# Patient Record
Sex: FEMALE | Race: BLACK OR AFRICAN AMERICAN | NOT HISPANIC OR LATINO | Employment: PART TIME | URBAN - METROPOLITAN AREA
[De-identification: names, ages, dates, MRNs, and addresses within clinical notes are randomized per-mention and may not be internally consistent; named-entity substitution may affect disease eponyms.]

---

## 2020-09-25 ENCOUNTER — LAB VISIT (OUTPATIENT)
Dept: LAB | Facility: OTHER | Age: 20
End: 2020-09-25
Payer: OTHER GOVERNMENT

## 2020-09-25 DIAGNOSIS — Z03.818 ENCOUNTER FOR OBSERVATION FOR SUSPECTED EXPOSURE TO OTHER BIOLOGICAL AGENTS RULED OUT: ICD-10-CM

## 2020-09-25 PROCEDURE — U0003 INFECTIOUS AGENT DETECTION BY NUCLEIC ACID (DNA OR RNA); SEVERE ACUTE RESPIRATORY SYNDROME CORONAVIRUS 2 (SARS-COV-2) (CORONAVIRUS DISEASE [COVID-19]), AMPLIFIED PROBE TECHNIQUE, MAKING USE OF HIGH THROUGHPUT TECHNOLOGIES AS DESCRIBED BY CMS-2020-01-R: HCPCS

## 2020-09-26 LAB — SARS-COV-2 RNA RESP QL NAA+PROBE: NOT DETECTED

## 2020-10-09 ENCOUNTER — LAB VISIT (OUTPATIENT)
Dept: LAB | Facility: OTHER | Age: 20
End: 2020-10-09
Payer: OTHER GOVERNMENT

## 2020-10-09 DIAGNOSIS — Z03.818 ENCOUNTER FOR OBSERVATION FOR SUSPECTED EXPOSURE TO OTHER BIOLOGICAL AGENTS RULED OUT: ICD-10-CM

## 2020-10-09 PROCEDURE — U0003 INFECTIOUS AGENT DETECTION BY NUCLEIC ACID (DNA OR RNA); SEVERE ACUTE RESPIRATORY SYNDROME CORONAVIRUS 2 (SARS-COV-2) (CORONAVIRUS DISEASE [COVID-19]), AMPLIFIED PROBE TECHNIQUE, MAKING USE OF HIGH THROUGHPUT TECHNOLOGIES AS DESCRIBED BY CMS-2020-01-R: HCPCS

## 2020-10-10 LAB — SARS-COV-2 RNA RESP QL NAA+PROBE: NOT DETECTED

## 2020-10-23 ENCOUNTER — LAB VISIT (OUTPATIENT)
Dept: LAB | Facility: OTHER | Age: 20
End: 2020-10-23
Payer: OTHER GOVERNMENT

## 2020-10-23 DIAGNOSIS — Z03.818 ENCOUNTER FOR OBSERVATION FOR SUSPECTED EXPOSURE TO OTHER BIOLOGICAL AGENTS RULED OUT: ICD-10-CM

## 2020-10-23 PROCEDURE — U0003 INFECTIOUS AGENT DETECTION BY NUCLEIC ACID (DNA OR RNA); SEVERE ACUTE RESPIRATORY SYNDROME CORONAVIRUS 2 (SARS-COV-2) (CORONAVIRUS DISEASE [COVID-19]), AMPLIFIED PROBE TECHNIQUE, MAKING USE OF HIGH THROUGHPUT TECHNOLOGIES AS DESCRIBED BY CMS-2020-01-R: HCPCS

## 2020-10-24 LAB — SARS-COV-2 RNA RESP QL NAA+PROBE: NOT DETECTED

## 2020-10-30 ENCOUNTER — LAB VISIT (OUTPATIENT)
Dept: LAB | Facility: OTHER | Age: 20
End: 2020-10-30
Attending: INTERNAL MEDICINE
Payer: OTHER GOVERNMENT

## 2020-10-30 DIAGNOSIS — Z03.818 ENCOUNTER FOR OBSERVATION FOR SUSPECTED EXPOSURE TO OTHER BIOLOGICAL AGENTS RULED OUT: ICD-10-CM

## 2020-10-30 PROCEDURE — U0003 INFECTIOUS AGENT DETECTION BY NUCLEIC ACID (DNA OR RNA); SEVERE ACUTE RESPIRATORY SYNDROME CORONAVIRUS 2 (SARS-COV-2) (CORONAVIRUS DISEASE [COVID-19]), AMPLIFIED PROBE TECHNIQUE, MAKING USE OF HIGH THROUGHPUT TECHNOLOGIES AS DESCRIBED BY CMS-2020-01-R: HCPCS

## 2020-11-01 DIAGNOSIS — Z03.818 ENCOUNTER FOR OBSERVATION FOR SUSPECTED EXPOSURE TO OTHER BIOLOGICAL AGENTS RULED OUT: ICD-10-CM

## 2020-11-02 LAB — SARS-COV-2 RNA RESP QL NAA+PROBE: NOT DETECTED

## 2020-11-06 ENCOUNTER — LAB VISIT (OUTPATIENT)
Dept: LAB | Facility: OTHER | Age: 20
End: 2020-11-06
Payer: OTHER GOVERNMENT

## 2020-11-06 DIAGNOSIS — Z03.818 ENCOUNTER FOR OBSERVATION FOR SUSPECTED EXPOSURE TO OTHER BIOLOGICAL AGENTS RULED OUT: ICD-10-CM

## 2020-11-06 PROCEDURE — U0003 INFECTIOUS AGENT DETECTION BY NUCLEIC ACID (DNA OR RNA); SEVERE ACUTE RESPIRATORY SYNDROME CORONAVIRUS 2 (SARS-COV-2) (CORONAVIRUS DISEASE [COVID-19]), AMPLIFIED PROBE TECHNIQUE, MAKING USE OF HIGH THROUGHPUT TECHNOLOGIES AS DESCRIBED BY CMS-2020-01-R: HCPCS

## 2020-11-07 LAB — SARS-COV-2 RNA RESP QL NAA+PROBE: NOT DETECTED

## 2020-11-13 ENCOUNTER — LAB VISIT (OUTPATIENT)
Dept: LAB | Facility: OTHER | Age: 20
End: 2020-11-13
Payer: OTHER GOVERNMENT

## 2020-11-13 DIAGNOSIS — Z03.818 ENCOUNTER FOR OBSERVATION FOR SUSPECTED EXPOSURE TO OTHER BIOLOGICAL AGENTS RULED OUT: ICD-10-CM

## 2020-11-13 PROCEDURE — U0003 INFECTIOUS AGENT DETECTION BY NUCLEIC ACID (DNA OR RNA); SEVERE ACUTE RESPIRATORY SYNDROME CORONAVIRUS 2 (SARS-COV-2) (CORONAVIRUS DISEASE [COVID-19]), AMPLIFIED PROBE TECHNIQUE, MAKING USE OF HIGH THROUGHPUT TECHNOLOGIES AS DESCRIBED BY CMS-2020-01-R: HCPCS

## 2020-11-14 LAB — SARS-COV-2 RNA RESP QL NAA+PROBE: NOT DETECTED

## 2020-11-20 ENCOUNTER — LAB VISIT (OUTPATIENT)
Dept: LAB | Facility: OTHER | Age: 20
End: 2020-11-20
Payer: OTHER GOVERNMENT

## 2020-11-20 DIAGNOSIS — Z03.818 ENCOUNTER FOR OBSERVATION FOR SUSPECTED EXPOSURE TO OTHER BIOLOGICAL AGENTS RULED OUT: ICD-10-CM

## 2020-11-20 PROCEDURE — U0003 INFECTIOUS AGENT DETECTION BY NUCLEIC ACID (DNA OR RNA); SEVERE ACUTE RESPIRATORY SYNDROME CORONAVIRUS 2 (SARS-COV-2) (CORONAVIRUS DISEASE [COVID-19]), AMPLIFIED PROBE TECHNIQUE, MAKING USE OF HIGH THROUGHPUT TECHNOLOGIES AS DESCRIBED BY CMS-2020-01-R: HCPCS

## 2020-11-22 LAB — SARS-COV-2 RNA RESP QL NAA+PROBE: NOT DETECTED

## 2020-11-27 ENCOUNTER — LAB VISIT (OUTPATIENT)
Dept: LAB | Facility: OTHER | Age: 20
End: 2020-11-27
Payer: OTHER GOVERNMENT

## 2020-11-27 DIAGNOSIS — Z03.818 ENCOUNTER FOR OBSERVATION FOR SUSPECTED EXPOSURE TO OTHER BIOLOGICAL AGENTS RULED OUT: ICD-10-CM

## 2020-11-27 PROCEDURE — U0003 INFECTIOUS AGENT DETECTION BY NUCLEIC ACID (DNA OR RNA); SEVERE ACUTE RESPIRATORY SYNDROME CORONAVIRUS 2 (SARS-COV-2) (CORONAVIRUS DISEASE [COVID-19]), AMPLIFIED PROBE TECHNIQUE, MAKING USE OF HIGH THROUGHPUT TECHNOLOGIES AS DESCRIBED BY CMS-2020-01-R: HCPCS

## 2020-11-28 LAB — SARS-COV-2 RNA RESP QL NAA+PROBE: NOT DETECTED

## 2020-12-04 ENCOUNTER — LAB VISIT (OUTPATIENT)
Dept: LAB | Facility: OTHER | Age: 20
End: 2020-12-04
Payer: OTHER GOVERNMENT

## 2020-12-04 DIAGNOSIS — Z03.818 ENCOUNTER FOR OBSERVATION FOR SUSPECTED EXPOSURE TO OTHER BIOLOGICAL AGENTS RULED OUT: ICD-10-CM

## 2020-12-04 PROCEDURE — U0003 INFECTIOUS AGENT DETECTION BY NUCLEIC ACID (DNA OR RNA); SEVERE ACUTE RESPIRATORY SYNDROME CORONAVIRUS 2 (SARS-COV-2) (CORONAVIRUS DISEASE [COVID-19]), AMPLIFIED PROBE TECHNIQUE, MAKING USE OF HIGH THROUGHPUT TECHNOLOGIES AS DESCRIBED BY CMS-2020-01-R: HCPCS

## 2020-12-06 LAB — SARS-COV-2 RNA RESP QL NAA+PROBE: NOT DETECTED

## 2020-12-11 ENCOUNTER — LAB VISIT (OUTPATIENT)
Dept: LAB | Facility: OTHER | Age: 20
End: 2020-12-11
Payer: OTHER GOVERNMENT

## 2020-12-11 DIAGNOSIS — Z03.818 ENCOUNTER FOR OBSERVATION FOR SUSPECTED EXPOSURE TO OTHER BIOLOGICAL AGENTS RULED OUT: ICD-10-CM

## 2020-12-11 PROCEDURE — U0003 INFECTIOUS AGENT DETECTION BY NUCLEIC ACID (DNA OR RNA); SEVERE ACUTE RESPIRATORY SYNDROME CORONAVIRUS 2 (SARS-COV-2) (CORONAVIRUS DISEASE [COVID-19]), AMPLIFIED PROBE TECHNIQUE, MAKING USE OF HIGH THROUGHPUT TECHNOLOGIES AS DESCRIBED BY CMS-2020-01-R: HCPCS

## 2020-12-14 LAB — SARS-COV-2 RNA RESP QL NAA+PROBE: NOT DETECTED

## 2020-12-18 ENCOUNTER — LAB VISIT (OUTPATIENT)
Dept: LAB | Facility: OTHER | Age: 20
End: 2020-12-18
Payer: OTHER GOVERNMENT

## 2020-12-18 DIAGNOSIS — Z03.818 ENCOUNTER FOR OBSERVATION FOR SUSPECTED EXPOSURE TO OTHER BIOLOGICAL AGENTS RULED OUT: ICD-10-CM

## 2020-12-18 PROCEDURE — U0003 INFECTIOUS AGENT DETECTION BY NUCLEIC ACID (DNA OR RNA); SEVERE ACUTE RESPIRATORY SYNDROME CORONAVIRUS 2 (SARS-COV-2) (CORONAVIRUS DISEASE [COVID-19]), AMPLIFIED PROBE TECHNIQUE, MAKING USE OF HIGH THROUGHPUT TECHNOLOGIES AS DESCRIBED BY CMS-2020-01-R: HCPCS

## 2020-12-20 LAB — SARS-COV-2 RNA RESP QL NAA+PROBE: NOT DETECTED

## 2020-12-23 ENCOUNTER — LAB VISIT (OUTPATIENT)
Dept: LAB | Facility: OTHER | Age: 20
End: 2020-12-23
Payer: OTHER GOVERNMENT

## 2020-12-23 DIAGNOSIS — Z03.818 ENCOUNTER FOR OBSERVATION FOR SUSPECTED EXPOSURE TO OTHER BIOLOGICAL AGENTS RULED OUT: ICD-10-CM

## 2020-12-23 PROCEDURE — U0003 INFECTIOUS AGENT DETECTION BY NUCLEIC ACID (DNA OR RNA); SEVERE ACUTE RESPIRATORY SYNDROME CORONAVIRUS 2 (SARS-COV-2) (CORONAVIRUS DISEASE [COVID-19]), AMPLIFIED PROBE TECHNIQUE, MAKING USE OF HIGH THROUGHPUT TECHNOLOGIES AS DESCRIBED BY CMS-2020-01-R: HCPCS

## 2020-12-24 DIAGNOSIS — U07.1 COVID-19 VIRUS DETECTED: ICD-10-CM

## 2020-12-24 LAB — SARS-COV-2 RNA RESP QL NAA+PROBE: DETECTED

## 2023-04-15 ENCOUNTER — HOSPITAL ENCOUNTER (EMERGENCY)
Facility: HOSPITAL | Age: 23
Discharge: HOME | End: 2023-04-15
Attending: EMERGENCY MEDICINE
Payer: MEDICAID

## 2023-04-15 VITALS
WEIGHT: 140 LBS | BODY MASS INDEX: 23.9 KG/M2 | SYSTOLIC BLOOD PRESSURE: 124 MMHG | RESPIRATION RATE: 16 BRPM | HEART RATE: 73 BPM | DIASTOLIC BLOOD PRESSURE: 82 MMHG | OXYGEN SATURATION: 99 % | HEIGHT: 64 IN | TEMPERATURE: 99 F

## 2023-04-15 DIAGNOSIS — R11.2 NAUSEA AND VOMITING, UNSPECIFIED VOMITING TYPE: ICD-10-CM

## 2023-04-15 DIAGNOSIS — Z3A.01 LESS THAN 8 WEEKS GESTATION OF PREGNANCY: Primary | ICD-10-CM

## 2023-04-15 DIAGNOSIS — R82.71 ASYMPTOMATIC BACTERIURIA DURING PREGNANCY: ICD-10-CM

## 2023-04-15 DIAGNOSIS — O99.891 ASYMPTOMATIC BACTERIURIA DURING PREGNANCY: ICD-10-CM

## 2023-04-15 LAB
ALBUMIN SERPL BCP-MCNC: 3.5 G/DL (ref 3.5–5.2)
ALP SERPL-CCNC: 59 U/L (ref 55–135)
ALT SERPL W/O P-5'-P-CCNC: 25 U/L (ref 10–44)
ANION GAP SERPL CALC-SCNC: 10 MMOL/L (ref 8–16)
AST SERPL-CCNC: 25 U/L (ref 10–40)
B-HCG UR QL: POSITIVE
BACTERIA #/AREA URNS AUTO: ABNORMAL /HPF
BASOPHILS # BLD AUTO: 0.03 K/UL (ref 0–0.2)
BASOPHILS NFR BLD: 0.4 % (ref 0–1.9)
BILIRUB SERPL-MCNC: 0.2 MG/DL (ref 0.1–1)
BILIRUB UR QL STRIP: NEGATIVE
BUN SERPL-MCNC: 11 MG/DL (ref 6–20)
CALCIUM SERPL-MCNC: 9.1 MG/DL (ref 8.7–10.5)
CHLORIDE SERPL-SCNC: 103 MMOL/L (ref 95–110)
CLARITY UR REFRACT.AUTO: CLEAR
CO2 SERPL-SCNC: 19 MMOL/L (ref 23–29)
COLOR UR AUTO: YELLOW
CREAT SERPL-MCNC: 0.6 MG/DL (ref 0.5–1.4)
CTP QC/QA: YES
DIFFERENTIAL METHOD: ABNORMAL
EOSINOPHIL # BLD AUTO: 0.1 K/UL (ref 0–0.5)
EOSINOPHIL NFR BLD: 0.9 % (ref 0–8)
ERYTHROCYTE [DISTWIDTH] IN BLOOD BY AUTOMATED COUNT: 12.6 % (ref 11.5–14.5)
EST. GFR  (NO RACE VARIABLE): >60 ML/MIN/1.73 M^2
GLUCOSE SERPL-MCNC: 71 MG/DL (ref 70–110)
GLUCOSE UR QL STRIP: NEGATIVE
HCG INTACT+B SERPL-ACNC: NORMAL MIU/ML
HCT VFR BLD AUTO: 28.8 % (ref 37–48.5)
HCV AB SERPL QL IA: NORMAL
HGB BLD-MCNC: 10.5 G/DL (ref 12–16)
HGB UR QL STRIP: NEGATIVE
HIV 1+2 AB+HIV1 P24 AG SERPL QL IA: NORMAL
IMM GRANULOCYTES # BLD AUTO: 0.02 K/UL (ref 0–0.04)
IMM GRANULOCYTES NFR BLD AUTO: 0.3 % (ref 0–0.5)
KETONES UR QL STRIP: ABNORMAL
LEUKOCYTE ESTERASE UR QL STRIP: ABNORMAL
LIPASE SERPL-CCNC: 11 U/L (ref 4–60)
LYMPHOCYTES # BLD AUTO: 2.2 K/UL (ref 1–4.8)
LYMPHOCYTES NFR BLD: 28.6 % (ref 18–48)
MCH RBC QN AUTO: 29 PG (ref 27–31)
MCHC RBC AUTO-ENTMCNC: 36.5 G/DL (ref 32–36)
MCV RBC AUTO: 80 FL (ref 82–98)
MICROSCOPIC COMMENT: ABNORMAL
MONOCYTES # BLD AUTO: 0.7 K/UL (ref 0.3–1)
MONOCYTES NFR BLD: 8.8 % (ref 4–15)
NEUTROPHILS # BLD AUTO: 4.8 K/UL (ref 1.8–7.7)
NEUTROPHILS NFR BLD: 61 % (ref 38–73)
NITRITE UR QL STRIP: NEGATIVE
NON-SQ EPI CELLS #/AREA URNS AUTO: 0 /HPF
NRBC BLD-RTO: 0 /100 WBC
PH UR STRIP: 7 [PH] (ref 5–8)
PLATELET # BLD AUTO: 234 K/UL (ref 150–450)
PMV BLD AUTO: 9.7 FL (ref 9.2–12.9)
POTASSIUM SERPL-SCNC: 3.6 MMOL/L (ref 3.5–5.1)
PROT SERPL-MCNC: 6.9 G/DL (ref 6–8.4)
PROT UR QL STRIP: ABNORMAL
RBC # BLD AUTO: 3.62 M/UL (ref 4–5.4)
RBC #/AREA URNS AUTO: 1 /HPF (ref 0–4)
SODIUM SERPL-SCNC: 132 MMOL/L (ref 136–145)
SP GR UR STRIP: 1.03 (ref 1–1.03)
SQUAMOUS #/AREA URNS AUTO: 7 /HPF
URN SPEC COLLECT METH UR: ABNORMAL
WBC # BLD AUTO: 7.84 K/UL (ref 3.9–12.7)
WBC #/AREA URNS AUTO: 6 /HPF (ref 0–5)

## 2023-04-15 PROCEDURE — 86803 HEPATITIS C AB TEST: CPT | Performed by: PHYSICIAN ASSISTANT

## 2023-04-15 PROCEDURE — 81001 URINALYSIS AUTO W/SCOPE: CPT | Performed by: PHYSICIAN ASSISTANT

## 2023-04-15 PROCEDURE — 81025 URINE PREGNANCY TEST: CPT | Performed by: PHYSICIAN ASSISTANT

## 2023-04-15 PROCEDURE — 96360 HYDRATION IV INFUSION INIT: CPT

## 2023-04-15 PROCEDURE — 80053 COMPREHEN METABOLIC PANEL: CPT | Performed by: PHYSICIAN ASSISTANT

## 2023-04-15 PROCEDURE — 85025 COMPLETE CBC W/AUTO DIFF WBC: CPT | Performed by: PHYSICIAN ASSISTANT

## 2023-04-15 PROCEDURE — 84702 CHORIONIC GONADOTROPIN TEST: CPT | Performed by: PHYSICIAN ASSISTANT

## 2023-04-15 PROCEDURE — 99284 EMERGENCY DEPT VISIT MOD MDM: CPT | Mod: 25

## 2023-04-15 PROCEDURE — 25000003 PHARM REV CODE 250: Performed by: PHYSICIAN ASSISTANT

## 2023-04-15 PROCEDURE — 99284 EMERGENCY DEPT VISIT MOD MDM: CPT | Mod: ,,, | Performed by: PHYSICIAN ASSISTANT

## 2023-04-15 PROCEDURE — 99284 PR EMERGENCY DEPT VISIT,LEVEL IV: ICD-10-PCS | Mod: ,,, | Performed by: PHYSICIAN ASSISTANT

## 2023-04-15 PROCEDURE — 87389 HIV-1 AG W/HIV-1&-2 AB AG IA: CPT | Performed by: PHYSICIAN ASSISTANT

## 2023-04-15 PROCEDURE — 83690 ASSAY OF LIPASE: CPT | Performed by: PHYSICIAN ASSISTANT

## 2023-04-15 RX ORDER — CEPHALEXIN 500 MG/1
500 CAPSULE ORAL 4 TIMES DAILY
Qty: 20 CAPSULE | Refills: 0 | Status: SHIPPED | OUTPATIENT
Start: 2023-04-15 | End: 2023-04-20

## 2023-04-15 RX ORDER — DICYCLOMINE HYDROCHLORIDE 10 MG/1
20 CAPSULE ORAL
Status: DISCONTINUED | OUTPATIENT
Start: 2023-04-15 | End: 2023-04-15

## 2023-04-15 RX ORDER — ONDANSETRON 2 MG/ML
4 INJECTION INTRAMUSCULAR; INTRAVENOUS
Status: DISCONTINUED | OUTPATIENT
Start: 2023-04-15 | End: 2023-04-15

## 2023-04-15 RX ADMIN — SODIUM CHLORIDE 1000 ML: 0.9 INJECTION, SOLUTION INTRAVENOUS at 07:04

## 2023-04-15 NOTE — Clinical Note
"Antonio"Marsha Holt was seen and treated in our emergency department on 4/15/2023.  She may return to work on 04/18/2023.       If you have any questions or concerns, please don't hesitate to call.      Hilda Cortes PA-C"

## 2023-04-16 NOTE — DISCHARGE INSTRUCTIONS
Your pregnancy test was positive today  Suspected UTI based off urine sample.  You were prescribed an antibiotic for infection.  Take as directed  Referral was placed for follow-up with OBGYN.  Follow-up is recommended in the next week  Recommend prenatal vitamin at this time.  You may purchase this over-the-counter.  Take as directed  Avoid alcohol, caffeine, drugs  Do not take ibuprofen.  If you experienced pain or fever it is recommended that you take Tylenol  Drink plenty of water or Pedialyte  Recommend a bland diet including rice, bananas, toast if you experienced nausea, vomiting, diarrhea  Return to the emergency department for new or worsening symptoms

## 2023-04-16 NOTE — ED PROVIDER NOTES
Encounter Date: 4/15/2023       History     Chief Complaint   Patient presents with    Nausea    Vomiting    Diarrhea     Nvd on and off for 2 weeks, L breast tenderness, lmp march 20th, feeling weak     22 year old female with no significant pmhx presents to the ED for intermittent nausea, vomiting and diarrhea x3 weeks. She denies abdominal pain but notes occasional lower abdominal cramping. Seems to be unrelated to food intake. Reports approximately 4 episodes of vomiting this week. Last episode was earlier today while she was a work. She LMP was approximately one month ago. Denies sick contacts. She is currently note taking anything for symptoms. She denies back pain, dysuria, vaginal discharge, bloody diarrhea, melena, fever.     The history is provided by the patient.   Review of patient's allergies indicates:  No Known Allergies  History reviewed. No pertinent past medical history.  History reviewed. No pertinent surgical history.  History reviewed. No pertinent family history.     Review of Systems   Constitutional:  Positive for fatigue.   Respiratory:  Negative for shortness of breath.    Cardiovascular:  Negative for chest pain.   Gastrointestinal:  Positive for diarrhea, nausea and vomiting. Negative for abdominal pain and blood in stool.   Genitourinary:  Negative for dysuria, hematuria, vaginal bleeding and vaginal discharge.   Musculoskeletal:  Negative for back pain.     Physical Exam     Initial Vitals [04/15/23 1838]   BP Pulse Resp Temp SpO2   138/80 81 16 99.3 °F (37.4 °C) 100 %      MAP       --         Physical Exam    Nursing note and vitals reviewed.  Constitutional: She appears well-developed and well-nourished. She is not diaphoretic. No distress.   HENT:   Head: Normocephalic and atraumatic.   Eyes: Conjunctivae and EOM are normal.   Neck: Neck supple.   Cardiovascular:  Normal rate and regular rhythm.           Pulmonary/Chest: Breath sounds normal. No respiratory distress.   Abdominal:  Abdomen is soft. Bowel sounds are normal. She exhibits no distension and no mass. There is no abdominal tenderness. There is no guarding.   Musculoskeletal:      Cervical back: Neck supple.     Neurological: She is alert and oriented to person, place, and time.   Psychiatric: She has a normal mood and affect. Her behavior is normal. Judgment and thought content normal.       ED Course   Procedures  Labs Reviewed   CBC W/ AUTO DIFFERENTIAL - Abnormal; Notable for the following components:       Result Value    RBC 3.62 (*)     Hemoglobin 10.5 (*)     Hematocrit 28.8 (*)     MCV 80 (*)     MCHC 36.5 (*)     All other components within normal limits   COMPREHENSIVE METABOLIC PANEL - Abnormal; Notable for the following components:    Sodium 132 (*)     CO2 19 (*)     All other components within normal limits   URINALYSIS, REFLEX TO URINE CULTURE - Abnormal; Notable for the following components:    Protein, UA Trace (*)     Ketones, UA Trace (*)     Leukocytes, UA 1+ (*)     All other components within normal limits    Narrative:     Specimen Source->Urine   URINALYSIS MICROSCOPIC - Abnormal; Notable for the following components:    WBC, UA 6 (*)     All other components within normal limits    Narrative:     Specimen Source->Urine   POCT URINE PREGNANCY - Abnormal; Notable for the following components:    POC Preg Test, Ur Positive (*)     All other components within normal limits   HIV 1 / 2 ANTIBODY    Narrative:     Release to patient->Immediate   HEPATITIS C ANTIBODY    Narrative:     Release to patient->Immediate   LIPASE   HCG, QUANTITATIVE    Narrative:     Release to patient->Immediate          Imaging Results    None          Medications   sodium chloride 0.9% bolus 1,000 mL 1,000 mL (0 mLs Intravenous Stopped 4/15/23 7457)     Medical Decision Making:   Initial Assessment:   22 year old female with no significant pmhx presents to the ED for intermittent nausea, vomiting and diarrhea x3 weeks. Hemodynamically  stable. Afebrile. Appears non toxic. Physical examination is unremarkable at this time. Will initiate work up.   Differential Diagnosis:   Viral gastroenteritis, covid, gastritis, colitis, pregnancy   Clinical Tests:   Lab Tests: Ordered and Reviewed  Radiological Study: Ordered and Reviewed  Medical Tests: Ordered and Reviewed  ED Management:  POC pregnancy test positive at this time. Bhcg appropriate for approximate gestational age. No leukocytosis. AST/ALT, bili, alk phos and lipase WNL. UA concerning for infection and will treat with oral antibiotics on discharge, though patient is asymptomatic this time. Pt is resting comfortable on reassessment. Discussed need for follow up with OBGYN. Referrals placed accordingly.   Other:   I discussed test(s) with the performing physician.           ED Course as of 04/16/23 1143   Sat Apr 15, 2023   2017 Preg Test, Ur(!): Positive [HM]   2017 WBC: 7.84 [HM]   2017 Hemoglobin(!): 10.5 [HM]   2136 WBC, UA(!): 6 [HM]   2136 Squam Epithel, UA: 7 [HM]   2136 Leukocytes, UA(!): 1+ [HM]   2136 HCG Quant: 02578 [HM]      ED Course User Index  [HM] Hilda Cortes PA-C                   Clinical Impression:   Final diagnoses:  [Z3A.01] Less than 8 weeks gestation of pregnancy (Primary)  [R11.2] Nausea and vomiting, unspecified vomiting type  [O99.891, R82.71] Asymptomatic bacteriuria during pregnancy        ED Disposition Condition    Discharge Stable          ED Prescriptions       Medication Sig Dispense Start Date End Date Auth. Provider    cephALEXin (KEFLEX) 500 MG capsule Take 1 capsule (500 mg total) by mouth 4 (four) times daily. for 5 days 20 capsule 4/15/2023 4/20/2023 Hilda Cortes PA-C          Follow-up Information       Follow up With Specialties Details Why Contact Info Additional Information    [facility] - Emergency Dept Emergency Medicine  If symptoms worsen [id number]  [facility] [address] [phone]     [facility] Obstetrics and Gynecology Go in 2 days  [address], [facility] [address]  [facility] [id number] [phone] [facility] - [facility], 1st Floor Please park in [facility] - [facility] Obstetrics, Obstetrics and Gynecology, Gynecology Schedule an appointment as soon as possible for a visit in 1 week  [address] [facility] [address]  [phone]                Hilda Cortes PA-C  04/16/23 1144

## 2023-04-16 NOTE — ED TRIAGE NOTES
Antonio Holt, a 22 y.o. female presents to the ED w/ complaint of NVD and abdominal pain for 2 weeks. Pt is also feeling weak and lightheaded     Triage note:  Chief Complaint   Patient presents with    Nausea    Vomiting    Diarrhea     Nvd on and off for 2 weeks, L breast tenderness, lmp march 20th, feeling weak     Review of patient's allergies indicates:  No Known Allergies  No past medical history on file.

## 2023-04-16 NOTE — PLAN OF CARE
CHANTEL faxed referral to [facility] obstetrics/gynecology to [phone]      Manuela Norris CD, MSW, LMSW, RSW   Case Management  [facility]  Email: [email or web address]

## 2023-04-20 DIAGNOSIS — Z36.89 ENCOUNTER FOR ROUTINE FETAL ULTRASOUND: Primary | ICD-10-CM

## 2023-04-21 ENCOUNTER — OFFICE VISIT (OUTPATIENT)
Dept: OBSTETRICS AND GYNECOLOGY | Facility: CLINIC | Age: 23
End: 2023-04-21
Payer: MEDICAID

## 2023-04-21 ENCOUNTER — HOSPITAL ENCOUNTER (OUTPATIENT)
Dept: PERINATAL CARE | Facility: OTHER | Age: 23
Discharge: HOME | End: 2023-04-21
Attending: FAMILY MEDICINE
Payer: MEDICAID

## 2023-04-21 VITALS
WEIGHT: 146.63 LBS | DIASTOLIC BLOOD PRESSURE: 66 MMHG | BODY MASS INDEX: 25.16 KG/M2 | SYSTOLIC BLOOD PRESSURE: 108 MMHG

## 2023-04-21 DIAGNOSIS — Z36.89 ENCOUNTER FOR FETAL ANATOMIC SURVEY: ICD-10-CM

## 2023-04-21 DIAGNOSIS — Z12.4 PAP SMEAR FOR CERVICAL CANCER SCREENING: ICD-10-CM

## 2023-04-21 DIAGNOSIS — N91.4 SECONDARY OLIGOMENORRHEA: ICD-10-CM

## 2023-04-21 DIAGNOSIS — Z3A.01 LESS THAN 8 WEEKS GESTATION OF PREGNANCY: ICD-10-CM

## 2023-04-21 DIAGNOSIS — Z36.89 ENCOUNTER FOR ROUTINE FETAL ULTRASOUND: ICD-10-CM

## 2023-04-21 DIAGNOSIS — Z32.01 POSITIVE URINE PREGNANCY TEST: Primary | ICD-10-CM

## 2023-04-21 LAB
B-HCG UR QL: POSITIVE
CTP QC/QA: YES

## 2023-04-21 PROCEDURE — 76801 US MFM PROCEDURE (VIEWPOINT): ICD-10-PCS | Mod: 26,,, | Performed by: OBSTETRICS & GYNECOLOGY

## 2023-04-21 PROCEDURE — 87591 N.GONORRHOEAE DNA AMP PROB: CPT | Performed by: FAMILY MEDICINE

## 2023-04-21 PROCEDURE — 1159F MED LIST DOCD IN RCRD: CPT | Mod: CPTII,,, | Performed by: FAMILY MEDICINE

## 2023-04-21 PROCEDURE — 99213 OFFICE O/P EST LOW 20 MIN: CPT | Mod: PBBFAC,25,TH | Performed by: FAMILY MEDICINE

## 2023-04-21 PROCEDURE — 1160F PR REVIEW ALL MEDS BY PRESCRIBER/CLIN PHARMACIST DOCUMENTED: ICD-10-PCS | Mod: CPTII,,, | Performed by: FAMILY MEDICINE

## 2023-04-21 PROCEDURE — 76801 OB US < 14 WKS SINGLE FETUS: CPT

## 2023-04-21 PROCEDURE — 3078F PR MOST RECENT DIASTOLIC BLOOD PRESSURE < 80 MM HG: ICD-10-PCS | Mod: CPTII,,, | Performed by: FAMILY MEDICINE

## 2023-04-21 PROCEDURE — 1160F RVW MEDS BY RX/DR IN RCRD: CPT | Mod: CPTII,,, | Performed by: FAMILY MEDICINE

## 2023-04-21 PROCEDURE — 76801 OB US < 14 WKS SINGLE FETUS: CPT | Mod: 26,,, | Performed by: OBSTETRICS & GYNECOLOGY

## 2023-04-21 PROCEDURE — 99999 PR PBB SHADOW E&M-EST. PATIENT-LVL III: CPT | Mod: PBBFAC,,, | Performed by: FAMILY MEDICINE

## 2023-04-21 PROCEDURE — 3074F PR MOST RECENT SYSTOLIC BLOOD PRESSURE < 130 MM HG: ICD-10-PCS | Mod: CPTII,,, | Performed by: FAMILY MEDICINE

## 2023-04-21 PROCEDURE — 87086 URINE CULTURE/COLONY COUNT: CPT | Performed by: FAMILY MEDICINE

## 2023-04-21 PROCEDURE — 1159F PR MEDICATION LIST DOCUMENTED IN MEDICAL RECORD: ICD-10-PCS | Mod: CPTII,,, | Performed by: FAMILY MEDICINE

## 2023-04-21 PROCEDURE — 3078F DIAST BP <80 MM HG: CPT | Mod: CPTII,,, | Performed by: FAMILY MEDICINE

## 2023-04-21 PROCEDURE — 99999 PR PBB SHADOW E&M-EST. PATIENT-LVL III: ICD-10-PCS | Mod: PBBFAC,,, | Performed by: FAMILY MEDICINE

## 2023-04-21 PROCEDURE — 99213 OFFICE O/P EST LOW 20 MIN: CPT | Mod: S$PBB,,, | Performed by: FAMILY MEDICINE

## 2023-04-21 PROCEDURE — 3008F PR BODY MASS INDEX (BMI) DOCUMENTED: ICD-10-PCS | Mod: CPTII,,, | Performed by: FAMILY MEDICINE

## 2023-04-21 PROCEDURE — 81025 URINE PREGNANCY TEST: CPT | Mod: PBBFAC | Performed by: FAMILY MEDICINE

## 2023-04-21 PROCEDURE — 99213 PR OFFICE/OUTPT VISIT, EST, LEVL III, 20-29 MIN: ICD-10-PCS | Mod: S$PBB,,, | Performed by: FAMILY MEDICINE

## 2023-04-21 PROCEDURE — 3074F SYST BP LT 130 MM HG: CPT | Mod: CPTII,,, | Performed by: FAMILY MEDICINE

## 2023-04-21 PROCEDURE — 3008F BODY MASS INDEX DOCD: CPT | Mod: CPTII,,, | Performed by: FAMILY MEDICINE

## 2023-04-21 PROCEDURE — 88175 CYTOPATH C/V AUTO FLUID REDO: CPT | Performed by: FAMILY MEDICINE

## 2023-04-21 NOTE — PROGRESS NOTES
HISTORY OF PRESENT ILLNESS:    Antonio Holt is a 22 y.o. female, No obstetric history on file.,  Patient's last menstrual period was 02/17/2023.  for a routine exam complaining of amenorrhea and positive home UPT. Having some NV, mostly in the morning. No VB. Pelvic cramping mild, more consistent past 3 days. Does not think shes had a pap yet. Pt and partner healthy (partner had a cardiac issue at birth but pt unsure exactly what). Elective AB in 2017 around 20w. Desired pregnancy. Was seen in the ER recently, dx with uti but didn't take keflex yet. No dysuria, hematuria, fever,. This is the extent of the patient's complaints at this time.     History reviewed. No pertinent past medical history.    History reviewed. No pertinent surgical history.    MEDICATIONS AND ALLERGIES:    No current outpatient medications on file.    Review of patient's allergies indicates:  No Known Allergies    Family History   Problem Relation Age of Onset    Breast cancer Neg Hx     Colon cancer Neg Hx     Ovarian cancer Neg Hx        Social History     Socioeconomic History    Marital status: Single   Tobacco Use    Smoking status: Never    Smokeless tobacco: Never   Substance and Sexual Activity    Alcohol use: Not Currently    Drug use: Never    Sexual activity: Not Currently     Partners: Male       COMPREHENSIVE GYN HISTORY:  PAP History: Denies abnormal Paps.  Infection History: Denies STDs. Denies PID.  Benign History: Denies uterine fibroids. Denies ovarian cysts. Denies endometriosis. Denies other conditions.  Cancer History: Denies cervical cancer. Denies uterine cancer or hyperplasia. Denies ovarian cancer. Denies vulvar cancer or pre-cancer. Denies vaginal cancer or pre-cancer. Denies breast cancer. Denies colon cancer.  Sexual Activity History: Reports currently being sexually active  Menstrual History: None.  Contraception: None    ROS:  GENERAL: No weight changes. No swelling. + fatigue. No fever.  CARDIOVASCULAR: No chest  pain. No shortness of breath. No leg cramps.   NEUROLOGICAL: No headaches. No vision changes.  BREASTS: No pain. No lumps. No discharge.  ABDOMEN: No pain. + nausea. + vomiting. No diarrhea. No constipation.  REPRODUCTIVE: No abnormal bleeding. +pelvic cramping  VULVA: No pain. No lesions. No itching.  VAGINA: No relaxation. No itching. No odor. No discharge. No lesions.  URINARY: No incontinence. No nocturia. No frequency. No dysuria.    /66   Wt 66.5 kg (146 lb 9.7 oz)   LMP 02/17/2023   BMI 25.16 kg/m²     PE:  Physical Exam:   Constitutional: She appears well-developed and well-nourished. She does not appear ill. No distress.        Pulmonary/Chest: Right breast exhibits no inverted nipple, no mass, no nipple discharge, no skin change, no tenderness and no swelling. Left breast exhibits no inverted nipple, no mass, no nipple discharge, no skin change, no tenderness and no swelling.          Genitourinary:    Vagina, right adnexa and left adnexa normal.      Pelvic exam was performed with patient supine.   The external female genitalia was normal.   Genitalia hair distrobution normal .   There is no rash or lesion on the right labia. There is no rash or lesion on the left labia. Cervix is normal. No rectocele, cystocele or unspecified prolapse of vaginal walls in the vagina.    pap smear completedUterus is enlarged. Uterus is not tender. Normal urethral meatus.Urethra findings: no urethral mass              Neurological: GCS eye subscore is 4. GCS verbal subscore is 5. GCS motor subscore is 6.       PROCEDURES:  UPT Positive  Genprobe  Pap    Results for orders placed or performed in visit on 04/21/23   POCT Urine Pregnancy   Result Value Ref Range    POC Preg Test, Ur Positive (A) Negative     Acceptable Yes        DIAGNOSIS:  Gyn exam  IUP with stated LMP of 2/17/23    Indication   ========   Indication: Estimation of Gestational Age     Method   ======   2D Color Doppler, [place] S8,  Transabdominal ultrasound examination. View: Good view     Pregnancy   =========   Jeffrey pregnancy. Number of fetuses: 1     Dating   ======   Ultrasound examination on: 2023   GA by U/S based upon: CRL   GA by U/S 12 w + 6 d   VEL by U/S: 10/28/2023   Assigned: based on ultrasound (CRL), selected on 2023   Assigned GA 12 w + 6 d   Assigned VEL: 10/28/2023     General Evaluation   ==============   Cardiac activity present   Amniotic fluid: normal amount     Fetal Biometry   ============   Standard    bpm   CRL 64.4 mm 12w 6d Hadlock     Fetal Anatomy   ===========   Cranium: appears normal, midline falx visualized.   The following structures appear normal:   Abdominal wall.   The following structures were visualized:   Stomach. Arms. Legs.     Maternal Structures   ===============   Uterus / Cervix   Uterus: Normal   Cervix: Visualized   Ovaries / Tubes / Adnexa   Rt ovary: Normal   Rt ovary D1 28 mm   Rt ovary D2 27 mm   Rt ovary D3 20 mm   Rt ovary Vol 7.7 cmÂ³   Lt ovary: Normal   Lt ovary D1 34 mm   Lt ovary D2 24 mm   Lt ovary D3 16 mm   Lt ovary Vol 6.6 cmÂ³     Impression   =========   A jeffrey living IUP is identified. VEL is assigned based on the CRL from today?s study. If other clinical data, such as IVF conception dating or prior ultrasound   assignment of VEL differs from the VEL assigned today, please contact the [facility] department so that this report can be revised to reflect the correct VEL.   The maternal adnexae are normal in appearance. The amount of free fluid seen in the pelvis is within normal physiologic range.      PLAN:Routine prenatal care    MEDICATIONS PRESCRIBED:  PNV    LABS AND TESTS ORDERED:  New Ob Labs      1st TRIMESTER COUNSELING: Discussed all, booklet provided  Common complaints of pregnancy  HIV and other routine prenatal tests including  genetic screening  Risk factors identified by prenatal history  Anticipated course of prenatal care  Nutrition  and weight gain counseling  Toxoplasmosis precautions (Cats/Raw Meat)  Sexual activity and exercise  Environmental/Work hazards  Travel  Tobacco (Ask, Advise, Assess, Assist, and Arrange), as well as alcohol and drug use  Use of any medications (Including supplements, Vitamins, Herbs, or OTC Drugs)  Indications for Ultrasound  Domestic violence  Seat belt use  Childbirth classes/Hospital facilities     TERATOLOGY COUNSELING: Discussed options for SS, MT21 and carrier screening. Pt will let us know her desires. Discussed time constraints on SS  -declined mt21 or ss at this time, info given    FOLLOW-UP for a New Ob Visit in  4    weeks with Elvira London CNM  -discussed to call clinic or L&D/ER if after hours for pain/bleeding. Increase water for cramping  -notify clinic if rx for NV needed  -anatomy ordered

## 2023-04-21 NOTE — PATIENT INSTRUCTIONS
LABOR AND DELIVERY PHONE NUMBER, [phone] (OPEN , LOCATED ON 6TH FLOOR OF HOSPITAL)  [address] PHONE NUMBER, [phone] (OPEN MON-FRI, 8a-5p)     1) Eat small frequent meals through the day versus three large meals  2) Try ginger ale or sprite to help settle the stomach   3) Eat crackers or dry toast before getting out of bed in the morning   4) Stay hydrated by drinking plenty of water-do not immediately eat or drink something after vomiting. Give your stomach a rest for 20-30 minutes. Slowly reintroduce ice chips, small sips water, crackers, etc.    5) Try OTC unisom (1/2 tablet) and vitamin B6 - take the 25mg b6 twice a day and then both together at night before bed. This can help with the nausea in the morning and is safe to use during pregnancy.    If you are unable to keep anything down and constantly vomiting for more that 24 hours, let the office know so that dehydration can be avoided. We would recommend being seen in the emergency department if this is the case.       Topic  General Pregnancy Information Recommended   (Unless Otherwise Contraindicated Or Restrictions Given To You By Your OB Doctor)      1. Anticipated course of prenatal care      Visits: will be Every 4 wks until 28 weeks, then every 2 weeks until 36 weeks, and then weekly until delivery.   Urine will be collected at each Obstetric visit        2. Nutrition and weight gain    Daily pre-selena vitamin (recommend taking at night)   Additional 300 calories needed daily  No Sushi, hotdogs, unpasteurized products (milk/cheeses). No large fish such as: shark, meron mackerel, tile, sword fish   Incorporate 12 ounces of smaller seafood/week and no more than 6oz of albacore tuna   Caffeine: 200 mg/day or 2 cups of caffeine/day   Weight gain recommendations are based off of BMI before pregnancy. Generally patients who with normal weight prior pregnancy it is recommended 25-35 pounds of weight gain during the pregnancy with an estimated  weekly gain of 1 pound/wk in 2nd and 3rd Trimester.    3. Toxoplasmosis precautions  If cats are in the home avoid changing litter boxes and if you need to change the litter box recommended you use gloves   4. Sexual Activity  Sexual activity is okay unless you are put on restrictions by your provider. I recommend urinating after intercourse    5. Exercise  Generally pre-pregnancy routine is okay to continue   Drink plenty fluids for hydration   Stop any activity that causes heavy cramping like a period or bright red bleeding and contact your provider  No extreme or contact sports   No exercise on your back for an extended period of time after 20 weeks    6. Hot Tub/Saunas  Avoid hot tubes and saunas    7. Hair Treatments  Because of the lack of scientific studies on the effects of chemical treatments on your hair, we must advise that you do it at your own risk. If you choose to treat your hair, we recommend that you wait until after 12 weeks gestation. At this time there is no reason to believe that normal hair treatment is associated with onsequences to the baby.    8. Vaccines  Influenza vaccine is recommended by CDC during flu season   Tdap (pertussis or whopping cough) recommended each pregnancy between 27 and 36 weeks   Tdap booster recommended for family and other planned direct caregivers    9. Water  Water is an important nutrient in a good diet. However, it cannot be stressed enough that during pregnancy water is essential. The body has increased circulation through blood vessels, and without a large increase in water, pregnant women will be dehydrated. It plays an important role in decreasing constipation, preventing  contractions, decreasing swelling, and preventing dizziness. We recommend that you drink 8-10 glasses of water per day.    10. Smoking/Alcohol/Illicit Drug Use  No safe Level   Can lead to problems with pregnancy   Growth of the developing fetus    labor (delivery before 37  weeks)    rupture of the membranes (water breaking before 37 weeks)   Premature separation of the placenta (which may cause bleeding)   American College of Obstetricians and Gynecologists endorses abstinence   Can lead to babies with disabilities    11. Environmental or work hazards  Unless otherwise restricted you may continue work throughout the pregnancy   Notify your provider of any work hazards or chemical exposure concerns   12. Travel    Safe to travel up to 35 weeks   Continue to wear a seatbelt and airbags are still recommended   Drink plenty fluids   Blood clots are a concern during pregnancy with long travel. Recommend compression stockings and moving around at least every 2 hours and staying hydrated.    13. Use of medications, vitamins, herbs, OTC drugs    Any medications not on the list provided to you from our clinic or given to you by one of our providers we recommend calling to make sure the medication is safe for you and baby.    14. Domestic Violence    Please notify office immediately of any concerns or violence so that we can help direct you to assistance needed   [place] [employer]: [phone]    15. Childbirth classes    List of Childbirth classes from [facility] is available    16. Selecting a Pediatrician  Selecting a pediatrician before delivery is recommended  You can interview pediatricians before delivery    17. Fetal Monitoring    A simple test of your babys well-being is a kick count. After 26 weeks, fetal motion of any kind should be monitored. Further discussion at that time   18.  Labor Signs    Water break, leaking fluids from Vagina prior 37 weeks  Regular contractions, Contractions that are more than 5-6/hour, getting stronger and painful with lower back pain, does not go away with rest and fluids    19. Postpartum Family Planning    Multiple options available from short term methods to long term reversible and irreversible methods    Discuss with provider as you get closer to delivery    20. Dental    It is recommended that you get an annual dental cleaning    21. Breastfeeding    Classes offered at [facility] and it is recommended to take a class    22. Lifting In 2013, the National [employer]ational Safety and Health (NIOSH) published clinical guidelines for occupational lifting in uncomplicated pregnancies. The recommended weight limits are based on gestational age, intermittent versus repetitive lifting, time (hours/day) spent lifting, and lifting height from floor and distance in 3 front of body. In this guideline, the maximum permissible weight for a woman less than 20 weeks of gestation performing infrequent lifting is 36 pounds (16 kgs) and the maximum permissible weight at ?20 weeks is 26 pounds (12 kgs). For repetitive lifting ?1 hour/day, the maximum weights in the first and second half of pregnancy are 18 pounds (8 kgs) and 13 pounds (6 kgs), respectively, and for repetitive lifting <1 hour/day, the maximum weights are 30 pounds (14 kgs) and 22 pounds (10 kgs), respectively. Although not based on high quality evidence, these guidelines are a reasonable reference for counseling pregnant women     23. Scheduling and Provider Availability    Your Obstetric Doctor is usually here weekly but not every day. We recommend you make 3-4 advanced appointments at a time to accommodate your personal needs and work/school obligations.   We ask that you come 15 minutes prior your scheduled appointment.   For same day appointments (not routine appointments) there is a Nurse Practitioner or another obstetric provider available. Please let the  aware you are an OB patient requesting a same day appointment.      24. Recommended Phone Eve    [facility]      MEDICATIONS IN PREGNANCY     While some medications are considered safe to take during pregnancy, the effects of other medications on your unborn baby are unknown.  Therefore, it is very important to pay special attention to medications you take while you are pregnant.   If you were taking prescription medications before you became pregnant, please ask your health care provider about continuing these medications as soon as you find out that you are pregnant. Do not stop taking any medications without discussing with your health care provider. Your health care provider will weigh the benefits and risks to your pregnancy when making his or her recommendation. With some medications, the risk of not taking them may be more serious than the potential risk of taking them.   If you are prescribed any new medication, please inform your health care provider that you are pregnant. Be sure to discuss the risks and benefits of the newly prescribed medication with your health care provider before taking the medication. We are always available to answer any questions about new medications and how they relate to your pregnancy.     Are Alternative Pregnancy Medicine Therapies Safe?   Many pregnant women believe natural products can be safely used to relieve nausea, backache, and other annoying symptoms of pregnancy, but many of these so-called natural products have not been tested for their safety and effectiveness. Therefore, it is very important to check with your health care provider before taking any alternative therapies. She will not recommend a product or therapy until it is shown to be safe and effective.     Which Over the Counter Drugs Are Safe?   Prenatal vitamins, now available without a prescription, are safe and important to take during pregnancy. Ask your health care provider about the safety of taking other vitamins, herbal remedies and supplements during pregnancy. Most herbal preparations and supplements have not been proven to be safe during pregnancy. Generally, you should not take any over-the-counter medication unless it is necessary. The following medications and home  remedies have no known harmful effects during pregnancy when taken according to the package directions. If you want to know about the safety of any other medications not listed here, please contact your health care provider.      Problem:  Safe to Take:    Pain relief, headache, and fever  Acetaminophen - Tylenol, Anacin Aspirin-Free    Heartburn  Acid neutralizers - Maalox, Mylanta, Rolaids, Tums, Gaviscon   Histamine-blockers - Pepcid, Zantac, Prilosec    Gas pains and bloating  Simethicone - Gas-X, Maalox Anti-Gas, Mylanta Gas, Mylicon    Nausea  Jennifer - beverages, tablets, candies   Vitamin B6   Emetrol (if not diabetic)   Sea bands   Anti-histamines - Sleep-heather, Benadryl, Bonnine, Dramamine    Cough  Guaifenesin (expectorant) - Hytuss, Mucinex, Robitussin   Dextromethorphan (antitussive) - Benylin, Delsym, Scot-Tussin DM   Guaifenesin plus dextromethorphan - Benylin Expectorant, Robitussin DM    Congestion  Pseudoephedrine - Sudafed, Actifed, Dristan, Neosynephrine   Vicks VapoRub   Saline nasal drops or spray    Sore throat  Throat lozenges - Sucrets, Cepacol, Cepastat, Ricola   Chloroseptic Spray   Warm salt/water gargle    Allergy relief  Chlorpheniramine - Chlor-Trimeton, Triaminic   Loratadine - Alavert, Claritin, Tavist ND, Triaminic Allerchews   Cetirizine - Zyrtec   Diphenhydramine -Benadryl, Diphenhist    Rashes  Hydrocortisone cream or ointment   Caladryl lotion or cream   Benadryl cream   Oatmeal bath (Aveeno)    Diarrhea  Loperamide - Imodium, Kaopectate, Maalox Anti-Diarrheal, Pepto Bismol    Constipation  Fiber supplements - Metamucil, Citrucel, Fiberall/Fibercon, Benefiber   Stool softeners - Colace, Senekot, Dulcolax   Milk of Magnesia    Hemorrhoids  Warm baths   Witch hazel preparations - Tucks medicated pads   Steroid preparations - Anusol-HC, Preparation H    Insomnia  Diphenhydramine - Benadryl, Unisom SleepGels, Nytol, Sominex   Doxylamine succinate - Unisom Nighttime Sleep-Aid     First-aid ointments  Cortaid, Lanacort, Polysporin, Bacitracin, Neosporin    Yeast infections  Call office for appointment      Connected MOM   Using Wireless Technology to Manage Your Prenatal Health   Congratulations! Your team here at [facility] is excited for the upcoming addition to your family and is ready to support you over the course of your pregnancy. One of the ways we are prepared to help you is through our exciting new Digital Medicine Program, Connected MOM. Connected MOM stands for Connected Maternity Online Monitoring and is offered free of charge as a way to help our expectant mothers manage their pregnancy with fewer visits to the obstetrician.    In the fast-paced environment we live in, patients demand convenient, reliable access to healthcare at their fingertips. Recommended by The American College of Obstetricians and Gynecologists (ACOG), the standard prenatal care model for low-risk pregnancies can average anywhere between 12-14 in-office prenatal visits.    Through this innovative program, participating mothers-to-be receive a wireless scale, wireless blood pressure cuff and an at-home urine protein test kit. Through the use of a smartphone, patients can easily send their weight, blood pressure readings and urine protein test results digitally in real-time from the comfort of their own homes. Results are sent directly to their [facility] account, the systems online patient portal which connects directly to the patients Electronic Medical Record. A specialized Connected MOM care team - comprised of the patients obstetrician, a dedicated health  and a  - reviews the data and provides medical recommendations as needed. This allows expectant mothers to receive care proactively, wherever they are, while minimizing the need for in-person visits and thus minimizing disruption to their regular daily activities.    How it Works At the initial prenatal  visit, interested patients can work with their obstetrician to sign up for the program. After the appointment, expectant mothers can head to the [email or web address], a first-of-its-kind retail experience created by [facility] offering the latest in cutting-edge, interactive healthcare technology, to receive a Connected MOM kit containing all of the digital tools needed for remote monitoring. Once enrolled, patients weigh themselves regularly and take their blood pressure once a week. Instead of coming to three office appointments at weeks 20, 30, 37 the patient will have the appointment at home. They will take their blood pressure, weight and perform three at-home urine protein tests at these home visits. Results are directly transmitted into the EMR, and notifications and messages from the care team are communicated via [facility].     TECH SUPPORT [phone]  [email or web address]      Chromosomal testing  The sequential screen is a test done around 11-13 weeks that consists of an ultrasound that measures the nuchal fold (neck thickness) of baby and blood work on the same day. You get a second set of labs done a few weeks later after 15 weeks. Based on all three of these results, they are able to tell you if you are considered to be low risk or high risk regarding neural tube defects and chromosomal abnormalities like Down Syndrome. If you are at all interested, I usually place the order today so we do not miss the window. Someone will give you a phone call in a week or two to schedule this. If you do not want it, just tell them no thank you. This test is typically covered by your insurance and is performed by the Maternal Fetal Medicine (MFM) department here at [facility]. It will not tell you the sex of the baby.     OR     2.  Call [phone] to see about coverage and any out of pocket costs regarding the Pyzvezpuo06 (MT21) testing. This is done any day after 11 weeks and is blood work only. It checks for any  chromosomal abnormalities like Down Syndrome. You can also find out the sex of the baby if you choose to know. Once you find out coverage and decide to proceed, send either myself or your doctor a message and we can see what date you can do it. It is done at our second floor lab at [facility].

## 2023-04-23 LAB
BACTERIA UR CULT: NORMAL
BACTERIA UR CULT: NORMAL
C TRACH DNA SPEC QL NAA+PROBE: NOT DETECTED
N GONORRHOEA DNA SPEC QL NAA+PROBE: NOT DETECTED

## 2023-04-27 LAB
FINAL PATHOLOGIC DIAGNOSIS: NORMAL
Lab: NORMAL

## 2023-05-01 ENCOUNTER — PATIENT MESSAGE (OUTPATIENT)
Dept: MATERNAL FETAL MEDICINE | Facility: CLINIC | Age: 23
End: 2023-05-01
Payer: MEDICAID

## 2023-05-15 ENCOUNTER — INITIAL PRENATAL (OUTPATIENT)
Dept: OBSTETRICS AND GYNECOLOGY | Facility: CLINIC | Age: 23
End: 2023-05-15
Payer: MEDICAID

## 2023-05-15 ENCOUNTER — PROCEDURE VISIT (OUTPATIENT)
Dept: OBSTETRICS AND GYNECOLOGY | Facility: CLINIC | Age: 23
End: 2023-05-15
Payer: MEDICAID

## 2023-05-15 VITALS
DIASTOLIC BLOOD PRESSURE: 69 MMHG | SYSTOLIC BLOOD PRESSURE: 126 MMHG | WEIGHT: 152.56 LBS | BODY MASS INDEX: 26.19 KG/M2

## 2023-05-15 DIAGNOSIS — Z34.82 ENCOUNTER FOR SUPERVISION OF OTHER NORMAL PREGNANCY IN SECOND TRIMESTER: Primary | ICD-10-CM

## 2023-05-15 DIAGNOSIS — O99.012 ANEMIA AFFECTING PREGNANCY IN SECOND TRIMESTER: ICD-10-CM

## 2023-05-15 PROBLEM — Z34.92 ENCOUNTER FOR SUPERVISION OF NORMAL PREGNANCY IN SECOND TRIMESTER: Status: ACTIVE | Noted: 2023-05-15

## 2023-05-15 PROCEDURE — 82105 ALPHA-FETOPROTEIN SERUM: CPT | Performed by: ADVANCED PRACTICE MIDWIFE

## 2023-05-15 PROCEDURE — 99213 OFFICE O/P EST LOW 20 MIN: CPT | Mod: TH,S$PBB,, | Performed by: ADVANCED PRACTICE MIDWIFE

## 2023-05-15 PROCEDURE — 99999 PR PBB SHADOW E&M-EST. PATIENT-LVL II: CPT | Mod: PBBFAC,,, | Performed by: ADVANCED PRACTICE MIDWIFE

## 2023-05-15 PROCEDURE — 99999 PR PBB SHADOW E&M-EST. PATIENT-LVL II: ICD-10-PCS | Mod: PBBFAC,,, | Performed by: ADVANCED PRACTICE MIDWIFE

## 2023-05-15 PROCEDURE — 99213 PR OFFICE/OUTPT VISIT, EST, LEVL III, 20-29 MIN: ICD-10-PCS | Mod: TH,S$PBB,, | Performed by: ADVANCED PRACTICE MIDWIFE

## 2023-05-15 PROCEDURE — 99212 OFFICE O/P EST SF 10 MIN: CPT | Mod: PBBFAC,TH,PN | Performed by: ADVANCED PRACTICE MIDWIFE

## 2023-05-15 RX ORDER — PRENATAL WITH FERROUS FUM AND FOLIC ACID 3080; 920; 120; 400; 22; 1.84; 3; 20; 10; 1; 12; 200; 27; 25; 2 [IU]/1; [IU]/1; MG/1; [IU]/1; MG/1; MG/1; MG/1; MG/1; MG/1; MG/1; UG/1; MG/1; MG/1; MG/1; MG/1
1 TABLET ORAL DAILY
Qty: 30 TABLET | Refills: 11 | Status: SHIPPED | OUTPATIENT
Start: 2023-05-15 | End: 2023-08-13 | Stop reason: SDUPTHER

## 2023-05-15 RX ORDER — FERROUS SULFATE 325(65) MG
325 TABLET, DELAYED RELEASE (ENTERIC COATED) ORAL
Qty: 30 TABLET | Refills: 10 | Status: SHIPPED | OUTPATIENT
Start: 2023-05-15 | End: 2023-08-13 | Stop reason: SDUPTHER

## 2023-05-15 NOTE — PROGRESS NOTES
Reason for visit: Initial Prenatal Visit      HPI:   22 y.o., at 16w2d by Estimated Date of Delivery: 10/28/23    In with report of slight nausea in morning- resolves with eating    - Contractions: denies  - Bleeding: denies  - Loss of fluid: denies  - Fetal movement: no  - Nausea: some  - Vomiting: occ  - Headache: no      Reviewed:    Past medical, surgical, social, family, and obstetric history: Reviewed and updated in EMR.  Medications: Reviewed and updated in EMR.  Allergies: Patient has no known allergies.    Pregnancy dating, labs, ultrasound reports, prenatal testing, and problem list: Reviewed and updated in EMR.  Outside records: na  Independent interpretation of tests: na  Discussion with another healthcare professional: na      Vitals: /69   Wt 69.2 kg (152 lb 8.9 oz)   LMP 2023   BMI 26.19 kg/m²     Physical exam:  GENERAL: No acute distress  ABD: Gravid      Assessment and Plan:    Encounter for supervision of other normal pregnancy in second trimester  -     Maternal Screen AFP (Single Marker); Future; Expected date: 05/15/2023         Mat 21 and AFP today   Fiona scan scheduled   Discussed anemia, rx Fe sent, discussed high Fe dietary changes     labor precautions given  Follow-up: 4 weeks      I spent a total of 20 minutes on the day of the visit. This includes face to face time and non-face to face time preparing to see the patient (eg, review of tests), Obtaining and/or reviewing separately obtained history, Documenting clinical information in the electronic or other health record, Independently interpreting results and communicating results to the patient/family/caregiver, or Care coordination.

## 2023-05-18 LAB
# FETUSES US: NORMAL
AFP INTERPRETATION: NORMAL
AFP MOM SERPL: 0.99
AFP SERPL-MCNC: 40.3 NG/ML
AFP SERPL-MCNC: NEGATIVE NG/ML
AGE AT DELIVERY: 24
GA (DAYS): 2 D
GA (WEEKS): 16 WK
GESTATIONAL AGE METHOD: NORMAL
IDDM PATIENT QL: NORMAL
SMOKING STATUS FTND: NO

## 2023-05-20 ENCOUNTER — PATIENT MESSAGE (OUTPATIENT)
Dept: OTHER | Facility: OTHER | Age: 23
End: 2023-05-20
Payer: MEDICAID

## 2023-05-20 ENCOUNTER — PATIENT MESSAGE (OUTPATIENT)
Dept: OBSTETRICS AND GYNECOLOGY | Facility: CLINIC | Age: 23
End: 2023-05-20
Payer: MEDICAID

## 2023-05-24 ENCOUNTER — PATIENT MESSAGE (OUTPATIENT)
Dept: OBSTETRICS AND GYNECOLOGY | Facility: CLINIC | Age: 23
End: 2023-05-24
Payer: MEDICAID

## 2023-06-01 ENCOUNTER — PATIENT MESSAGE (OUTPATIENT)
Dept: OBSTETRICS AND GYNECOLOGY | Facility: CLINIC | Age: 23
End: 2023-06-01
Payer: MEDICAID

## 2023-06-05 ENCOUNTER — PROCEDURE VISIT (OUTPATIENT)
Dept: MATERNAL FETAL MEDICINE | Facility: CLINIC | Age: 23
End: 2023-06-05
Payer: MEDICAID

## 2023-06-05 DIAGNOSIS — Z36.89 ENCOUNTER FOR FETAL ANATOMIC SURVEY: ICD-10-CM

## 2023-06-05 PROCEDURE — 76805 US MFM PROCEDURE (VIEWPOINT): ICD-10-PCS | Mod: 26,S$PBB,, | Performed by: OBSTETRICS & GYNECOLOGY

## 2023-06-05 PROCEDURE — 76805 OB US >/= 14 WKS SNGL FETUS: CPT | Mod: PBBFAC | Performed by: OBSTETRICS & GYNECOLOGY

## 2023-06-10 ENCOUNTER — PATIENT MESSAGE (OUTPATIENT)
Dept: OTHER | Facility: OTHER | Age: 23
End: 2023-06-10
Payer: MEDICAID

## 2023-06-11 NOTE — PROGRESS NOTES
Lab Documentation:    Order Type: Written Order placed in [facility]    Patient in for lab visit only per provider treatment plan.

## 2023-06-12 ENCOUNTER — PATIENT MESSAGE (OUTPATIENT)
Dept: OBSTETRICS AND GYNECOLOGY | Facility: CLINIC | Age: 23
End: 2023-06-12
Payer: MEDICAID

## 2023-06-20 ENCOUNTER — HOSPITAL ENCOUNTER (EMERGENCY)
Facility: HOSPITAL | Age: 23
Discharge: HOME | End: 2023-06-21
Attending: EMERGENCY MEDICINE
Payer: MEDICAID

## 2023-06-20 ENCOUNTER — TELEPHONE (OUTPATIENT)
Dept: OBSTETRICS AND GYNECOLOGY | Facility: CLINIC | Age: 23
End: 2023-06-20
Payer: MEDICAID

## 2023-06-20 DIAGNOSIS — A59.9 TRICHOMONIASIS: ICD-10-CM

## 2023-06-20 DIAGNOSIS — N93.9 VAGINAL SPOTTING: Primary | ICD-10-CM

## 2023-06-20 DIAGNOSIS — Z34.90 PREGNANCY, UNSPECIFIED GESTATIONAL AGE: ICD-10-CM

## 2023-06-20 PROCEDURE — 76815 OB US LIMITED FETUS(S): CPT | Mod: 26,,, | Performed by: EMERGENCY MEDICINE

## 2023-06-20 PROCEDURE — 99284 EMERGENCY DEPT VISIT MOD MDM: CPT

## 2023-06-20 PROCEDURE — 99284 PR EMERGENCY DEPT VISIT,LEVEL IV: ICD-10-PCS | Mod: 25,,, | Performed by: EMERGENCY MEDICINE

## 2023-06-20 PROCEDURE — 99284 EMERGENCY DEPT VISIT MOD MDM: CPT | Mod: 25,,, | Performed by: EMERGENCY MEDICINE

## 2023-06-20 PROCEDURE — 76815 PR  US,PREGNANT UTERUS,LIMITED, 1/> FETUSES: ICD-10-PCS | Mod: 26,,, | Performed by: EMERGENCY MEDICINE

## 2023-06-21 VITALS
RESPIRATION RATE: 17 BRPM | HEART RATE: 87 BPM | SYSTOLIC BLOOD PRESSURE: 130 MMHG | DIASTOLIC BLOOD PRESSURE: 58 MMHG | OXYGEN SATURATION: 100 % | TEMPERATURE: 98 F

## 2023-06-21 LAB
BACTERIA #/AREA URNS AUTO: ABNORMAL /HPF
BACTERIA GENITAL QL WET PREP: ABNORMAL
BILIRUB UR QL STRIP: NEGATIVE
C TRACH DNA SPEC QL NAA+PROBE: NOT DETECTED
CLARITY UR REFRACT.AUTO: ABNORMAL
CLUE CELLS VAG QL WET PREP: ABNORMAL
COLOR UR AUTO: YELLOW
FILAMENT FUNGI VAG WET PREP-#/AREA: ABNORMAL
GLUCOSE UR QL STRIP: NEGATIVE
HGB UR QL STRIP: NEGATIVE
HYALINE CASTS UR QL AUTO: 0 /LPF
KETONES UR QL STRIP: NEGATIVE
LEUKOCYTE ESTERASE UR QL STRIP: ABNORMAL
MICROSCOPIC COMMENT: ABNORMAL
N GONORRHOEA DNA SPEC QL NAA+PROBE: NOT DETECTED
NITRITE UR QL STRIP: NEGATIVE
PH UR STRIP: 8 [PH] (ref 5–8)
PROT UR QL STRIP: ABNORMAL
RBC #/AREA URNS AUTO: 4 /HPF (ref 0–4)
SP GR UR STRIP: 1.02 (ref 1–1.03)
SPECIMEN SOURCE: ABNORMAL
SQUAMOUS #/AREA URNS AUTO: 1 /HPF
T VAGINALIS GENITAL QL WET PREP: ABNORMAL
URN SPEC COLLECT METH UR: ABNORMAL
WBC #/AREA URNS AUTO: 32 /HPF (ref 0–5)
WBC #/AREA VAG WET PREP: ABNORMAL
YEAST GENITAL QL WET PREP: ABNORMAL

## 2023-06-21 PROCEDURE — 63600175 PHARM REV CODE 636 W HCPCS: Performed by: EMERGENCY MEDICINE

## 2023-06-21 PROCEDURE — 81001 URINALYSIS AUTO W/SCOPE: CPT | Performed by: EMERGENCY MEDICINE

## 2023-06-21 PROCEDURE — 87210 SMEAR WET MOUNT SALINE/INK: CPT | Performed by: EMERGENCY MEDICINE

## 2023-06-21 PROCEDURE — 63700000 PHARM REV CODE 250 ALT 637 W/O HCPCS: Performed by: EMERGENCY MEDICINE

## 2023-06-21 PROCEDURE — 87591 N.GONORRHOEAE DNA AMP PROB: CPT | Performed by: EMERGENCY MEDICINE

## 2023-06-21 PROCEDURE — 96372 THER/PROPH/DIAG INJ SC/IM: CPT | Performed by: EMERGENCY MEDICINE

## 2023-06-21 PROCEDURE — 25000003 PHARM REV CODE 250: Performed by: EMERGENCY MEDICINE

## 2023-06-21 PROCEDURE — 87088 URINE BACTERIA CULTURE: CPT | Performed by: EMERGENCY MEDICINE

## 2023-06-21 PROCEDURE — 87086 URINE CULTURE/COLONY COUNT: CPT | Performed by: EMERGENCY MEDICINE

## 2023-06-21 RX ORDER — CEFTRIAXONE 500 MG/1
500 INJECTION, POWDER, FOR SOLUTION INTRAMUSCULAR; INTRAVENOUS
Status: COMPLETED | OUTPATIENT
Start: 2023-06-21 | End: 2023-06-21

## 2023-06-21 RX ORDER — METRONIDAZOLE 500 MG/1
500 TABLET ORAL EVERY 12 HOURS
Qty: 14 TABLET | Refills: 0 | Status: SHIPPED | OUTPATIENT
Start: 2023-06-21 | End: 2023-06-28

## 2023-06-21 RX ORDER — AZITHROMYCIN 250 MG/1
1000 TABLET, FILM COATED ORAL
Status: COMPLETED | OUTPATIENT
Start: 2023-06-21 | End: 2023-06-21

## 2023-06-21 RX ORDER — METRONIDAZOLE 500 MG/1
500 TABLET ORAL
Status: COMPLETED | OUTPATIENT
Start: 2023-06-21 | End: 2023-06-21

## 2023-06-21 RX ADMIN — METRONIDAZOLE 500 MG: 500 TABLET ORAL at 03:06

## 2023-06-21 RX ADMIN — CEFTRIAXONE SODIUM 500 MG: 500 INJECTION, POWDER, FOR SOLUTION INTRAMUSCULAR; INTRAVENOUS at 03:06

## 2023-06-21 RX ADMIN — AZITHROMYCIN MONOHYDRATE 1000 MG: 250 TABLET ORAL at 03:06

## 2023-06-21 NOTE — DISCHARGE INSTRUCTIONS
As explained, you have Trichomonas.  This is an STD.  You were started on treatment for it but need to complete 1 week of antibiotics.    All partners need to be treated and complete treatment for 2 weeks prior to having additional intercourse with them.  You were also treated for gonorrhea and chlamydia.    It is very important you address these STDs, in particular during pregnancy.    No emergent cause of your vaginal bleeding was found.    Be sure to follow-up with your OBGYN.    Return to the ER for any worsening bleeding, pain, or other concerning symptoms.

## 2023-06-21 NOTE — ED PROVIDER NOTES
"Encounter Date: 2023       History     Chief Complaint   Patient presents with    Vaginal Bleeding     Pt c/o "spotting" this morning. 22wks pregnant. Denies cramping     21 yo W,  at 22wga by LMP presents with a chief complaint of vaginal spotting. LMP 23.  Patient follows with OB at [facility].  She notes she was recently started on iron pills but pregnancy otherwise has been unremarkable.  She notes she had vaginal spotting this morning.  She did not saturate any pads.  It has since resolved.  No vaginal bleeding since.  No abdominal pain.  No contractions.  She does report pain in her back.  Present in the bilateral lower back.  It has been present for 2 months.  She is been suffering from lightheadedness for the past week or so and not necessarily worsened today.  No dysuria, urinary frequency, vaginal burning, vaginal pain. No previous spotting.      Review of patient's allergies indicates:  No Known Allergies  History reviewed. No pertinent past medical history.  History reviewed. No pertinent surgical history.  Family History   Problem Relation Age of Onset    Breast cancer Neg Hx     Colon cancer Neg Hx     Ovarian cancer Neg Hx      Social History     Tobacco Use    Smoking status: Never    Smokeless tobacco: Never   Substance Use Topics    Alcohol use: Not Currently    Drug use: Never     Review of Systems    Physical Exam     Initial Vitals [23 2142]   BP Pulse Resp Temp SpO2   (!) 126/55 84 16 97.9 °F (36.6 °C) 100 %      MAP       --         Physical Exam    Nursing note and vitals reviewed.  Constitutional: She appears well-developed and well-nourished. She is not diaphoretic. No distress.   HENT:   Head: Normocephalic.   Eyes: Conjunctivae and EOM are normal. Right eye exhibits no discharge. Left eye exhibits no discharge. No scleral icterus.   Neck: Neck supple. No JVD present.   Normal range of motion.  Cardiovascular:  Normal rate.           Pulmonary/Chest: No respiratory " distress.   Abdominal: Abdomen is soft. Bowel sounds are normal. She exhibits no mass. There is no abdominal tenderness.   gravid   No right CVA tenderness.  No left CVA tenderness. There is no rebound and no guarding.   Genitourinary: Cervix exhibits no motion tenderness, no discharge and no friability. Right adnexum displays no mass, no tenderness and no fullness. Left adnexum displays no mass, no tenderness and no fullness.    Vaginal discharge (scant, thin, white) present.      No vaginal tenderness or bleeding.   No tenderness or bleeding in the vagina.    Genitourinary Comments: Cervix closed     Musculoskeletal:         General: No tenderness or edema. Normal range of motion.      Cervical back: Normal range of motion and neck supple.      Comments: No midline tenderness to palpation over thoracic or lumbar spine  No tenderness palpation over bilateral flanks or paraspinal musculature     Neurological: She is alert and oriented to person, place, and time. She has normal strength. No sensory deficit.   Skin: Skin is warm and dry. Capillary refill takes less than 2 seconds.   Psychiatric: She has a normal mood and affect.       ED Course   Procedures  Labs Reviewed   VAGINAL SCREEN - Abnormal; Notable for the following components:       Result Value    Trichomonas Rare (*)     Budding Yeast Rare (*)     WBC - Vaginal Screen Few (*)     Bacteria - Vaginal Screen Many (*)     All other components within normal limits    Narrative:     Release to patient->Immediate   URINALYSIS, REFLEX TO URINE CULTURE - Abnormal; Notable for the following components:    Appearance, UA Hazy (*)     Protein, UA 1+ (*)     Leukocytes, UA 2+ (*)     All other components within normal limits    Narrative:     Specimen Source->Urine   URINALYSIS MICROSCOPIC - Abnormal; Notable for the following components:    WBC, UA 32 (*)     All other components within normal limits    Narrative:     Specimen Source->Urine   C. TRACHOMATIS/N.  GONORRHOEAE BY AMP DNA   CULTURE, URINE          Imaging Results    None          Medications   metroNIDAZOLE tablet 500 mg (has no administration in time range)   cefTRIAXone injection 500 mg (has no administration in time range)   azithromycin tablet 1,000 mg (has no administration in time range)     Medical Decision Making:   History:   Old Medical Records: I decided to obtain old medical records.  Initial Assessment:   23 yo W,  at 22wga by LMP presents with a chief complaint of vaginal spotting, since resolved.  Differential Diagnosis:   Vaginal bleeding, pregnancy, placenta previa, BV, UTI  Clinical Tests:   Lab Tests: Ordered  ED Management:  Bedside ultrasound reveals active intrauterine pregnancy.  Placenta located towards uterine fundus.  Will obtain urine labs and UA.                 Reassessment:  Pregnancy screen reveals rare Trichomonas.  UA with pyuria and occasional bacteria.  Patient with no urinary symptoms.  Will await culture for treatment.  The Trichomonas result was discussed with the patient.  She denies being sexually active with anyone other than the baby's daddy remotely.  I explained that she could have had this infection for awhile but it is important is being treated during pregnancy.  Initiated course of metronidazole with 1st dose in ED.  Also empiric treatment for GC and CHL.  Probes were sent.  No emergent etiology of her vaginal bleeding was found today.  No further bleeding in the ER.  Advised follow-up with OB.  Extensive return precautions.  Safe sex precautions.       Clinical Impression:   Final diagnoses:  [N93.9] Vaginal spotting (Primary)  [Z34.90] Pregnancy, unspecified gestational age  [A59.9] Trichomoniasis        ED Disposition Condition    Discharge Stable          ED Prescriptions       Medication Sig Dispense Start Date End Date Auth. Provider    metroNIDAZOLE (FLAGYL) 500 MG tablet Take 1 tablet (500 mg total) by mouth every 12 (twelve) hours. for 7 days 14  tablet 6/21/2023 6/28/2023 Stephen Oates MD          Follow-up Information    None          Stephen Oates MD  06/21/23 7455

## 2023-06-21 NOTE — ED NOTES
"Antonio Holt, a 22 y.o. female presents to the ED w/ complaint of     Triage note:  Chief Complaint   Patient presents with    Vaginal Bleeding     Pt c/o "spotting" this morning. 22wks pregnant. Denies cramping     Review of patient's allergies indicates:  No Known Allergies  History reviewed. No pertinent past medical history. Patient identifiers for Antonio Holt checked and correct.    LOC: The patient is awake, alert and aware of environment with an appropriate affect, the patient is oriented x 4 and speaking appropriately.  APPEARANCE: Patient resting comfortably and in no acute distress, patient is clean and well groomed, patient's clothing is properly fastened.  SKIN: The skin is warm and dry, color consistent with ethnicity, patient has normal skin turgor and moist mucus membranes, skin intact, no breakdown or bruising noted.  MUSCULOSKELETAL: Patient moving all extremities well, no obvious swelling or deformities noted.  RESPIRATORY: Airway is open and patent, respirations are spontaneous and even, patient has a normal effort and rate.  CARDIAC: Patient has a normal rate and rhythm, no periphreal edema noted, capillary refill < 3 seconds. Normal +2 pedal pulses present.  ABDOMEN: Soft and non tender to palpation, no distention noted. Patient denies any nausea, vomiting, diarrhea, or constipation.   NEUROLOGIC: Eyes open spontaneously, PERRL, behavior appropriate to situation, follows commands, facial expression symmetrical, bilateral hand grasp equal and even, purposeful motor response noted, normal sensation in all extremities.   : spotting    "

## 2023-06-23 LAB — BACTERIA UR CULT: ABNORMAL

## 2023-06-30 ENCOUNTER — PATIENT OUTREACH (OUTPATIENT)
Dept: EMERGENCY MEDICINE | Facility: HOSPITAL | Age: 23
End: 2023-06-30
Payer: MEDICAID

## 2023-06-30 NOTE — PROGRESS NOTES
Deidre Serrano LPN  ED Navigator  Emergency Department    Project: [facility] ED Navigator  Role: Community Health Worker    Date: 06/30/2023  Patient Name: Antonio Holt  MRN: [id number]  PCP: Kushal    Assessment:     Antonio Holt is a 22 y.o. female who has presented to ED for foot swelling. Patient has visited the ED 2 times in the past 3 months. Patient did not contact PCP.     ED Navigator Initial Assessment    ED Navigator Enrollment Documentation  Consent to Services  Does patient consent to completing the assessment?: Yes  Contact  Method of Initial Contact: Phone  Transportation  Does the patient have issues with Transportation?: No  Does the patient have transportation to and from healthcare appointments?: Yes  Insurance Coverage  Do you have coverage/adequate coverage?: Yes  Type/kind of coverage: Medicaid/Healthy Blue ([employer])  Is patient able to afford co-pays/deductibles?: Yes  Is patient able to afford HME or supplies?: Yes  Does patient have an established [facility] PCP?: Yes  Able to access?: Yes  Does the patient have a lack of adequate coverage?: No  Specialist Appointment  Did the patient come to the ED to see a specialist?: No  Does the patient have a pending specialist referral?: No  Does the patient have a specialist appointment made?: No  PCP Follow Up Appointment  Has the patient had an appointment with a primary care provider in the past year?: Yes  Approximate date: 3/30/23  Provider: Kushal  Does the patient have a follow up appontment with a PCP?: No  When was the last time you saw your PCP?: 3/30/23  Why does the patient not have a follow up scheduled?: Other (see comments) (Comment: Pt is currently seeing her OB/GYN for primary care needs.)  Medications  Is patient able to afford medication?: Yes  Is patient unable to get medication due to lack of transportation?: No  Psychological  Does the patient have psycho-social concerns?:  No  Food  Does the patient have concerns about food?: Yes  What concerns does the patient have regarding food?: Lack of food  Communication/Education  Does the patient have limited English proficiency/English not primary language?: No  Does patient have low literacy and/or low health literacy?: No  Does patient have concerns with care?: No  Does patient have dissatisfaction with care?: No  Other Financial Concerns  Does the patient have immediate financial distress?: Yes  Does the patient have general financial concerns?: Yes  Other Social Barriers/Concerns  Does the patient have any additional barriers or concerns?: Unable to afford utilities, Other (see comments) (Comment: food)  Primary Barrier  Barriers identified: Financial barrier (health insurance, employment, etc.), Structural barrier (service availability, waiting times, etc.)  Root Cause of ED Utilization: Housing Instability  Plan to address Housing Instability: Patient was provided with shelter/homeless resources, Patient was provided with income-based housing options, Patient was provided information regarding SNAP application/food bank/pantry resources for their region  Next steps: Provided Education, Scheduled Appointment/Referral  Scheduled Appointment Date: 8/8/23  Appointment Reminder Date: 8/7/23  Was education/educational materials provided surrounding PCP services/creating a medical home?: Yes Was education verbal or written?: Written     Was education/educational materials provided surrounding low cost, healthy foods?: Yes Was education verbal or written?: Written     Was education/educational materials provided surrounding other items? If so, use comment to explain.: Yes Was education verbal or written?: Written   Plan: Provided [facility] PCP assistance line ([phone])  Expected Date of Follow Up 1: 7/28/23         Social History     Socioeconomic History    Marital status: Single   Tobacco Use    Smoking status: Never    Smokeless tobacco:  Never   Substance and Sexual Activity    Alcohol use: Not Currently    Drug use: Never    Sexual activity: Not Currently     Partners: Male     Social Determinants of Health     Financial Resource Strain: Medium Risk    Difficulty of Paying Living Expenses: Somewhat hard   Food Insecurity: Food Insecurity Present    Worried About Running Out of Food in the Last Year: Sometimes true   Transportation Needs: No Transportation Needs    Lack of Transportation (Medical): No    Lack of Transportation (Non-Medical): No   Physical Activity: Inactive    Days of Exercise per Week: 0 days    Minutes of Exercise per Session: 0 min   Stress: No Stress Concern Present    Feeling of Stress : Not at all   Social Connections: Socially Isolated    Frequency of Communication with Friends and Family: More than three times a week    Frequency of Social Gatherings with Friends and Family: More than three times a week    Attends Ep[employer] Services: Never    Active Member of Clubs or Organizations: No    Attends Club or Organization Meetings: Never    Marital Status: Never    Housing Stability: High Risk    Unable to Pay for Housing in the Last Year: Yes    Unstable Housing in the Last Year: Yes       Plan:   Call provided to Pt to f/u from recent ER visits for foot swelling and vaginal bleeding on two sperate occasions. Pt states she is doing ok but was concerned with the possibility of preeclampsia. Her BP was checked and noted to be normal and she was told that was not a concern at this time. Pt encouraged to continue to follow closely with her  OB/GYN and to set up an appt with a PCP. Patient was given Medicaid PCP Information Line ([phone]).  Pt states she could use assistance with extra food. She is currently receiving food stamps but is providing food for 2 households as she stays with her mom and aunt from time to time. Patient was given food bank/pantry resources for their region. Patient was given education on (The  Right Care at the Right Level information, [place] Virtual Visit information, and Heart healthy diet tips).  Patient was given education on Rent/Mortgage payment assistance for their region.  Patient was given utility assistance resources for their area.   Patient was given education on Stress and anxiety relief.   Resources forwarded to eeylirrjj5253@[employer] for assistance.  Deidre Serrano    Food Insecurity: resources provided

## 2023-07-08 ENCOUNTER — PATIENT MESSAGE (OUTPATIENT)
Dept: OTHER | Facility: OTHER | Age: 23
End: 2023-07-08
Payer: MEDICAID

## 2023-07-10 ENCOUNTER — LAB VISIT (OUTPATIENT)
Dept: LAB | Facility: OTHER | Age: 23
End: 2023-07-10
Attending: ADVANCED PRACTICE MIDWIFE
Payer: MEDICAID

## 2023-07-10 ENCOUNTER — ROUTINE PRENATAL (OUTPATIENT)
Dept: OBSTETRICS AND GYNECOLOGY | Facility: CLINIC | Age: 23
End: 2023-07-10
Payer: MEDICAID

## 2023-07-10 VITALS
BODY MASS INDEX: 29.14 KG/M2 | SYSTOLIC BLOOD PRESSURE: 122 MMHG | DIASTOLIC BLOOD PRESSURE: 70 MMHG | WEIGHT: 169.75 LBS

## 2023-07-10 DIAGNOSIS — Z34.82 ENCOUNTER FOR SUPERVISION OF OTHER NORMAL PREGNANCY IN SECOND TRIMESTER: Primary | ICD-10-CM

## 2023-07-10 DIAGNOSIS — Z34.82 ENCOUNTER FOR SUPERVISION OF OTHER NORMAL PREGNANCY IN SECOND TRIMESTER: ICD-10-CM

## 2023-07-10 LAB
BASOPHILS # BLD AUTO: 0.03 K/UL (ref 0–0.2)
BASOPHILS NFR BLD: 0.4 % (ref 0–1.9)
DIFFERENTIAL METHOD: ABNORMAL
EOSINOPHIL # BLD AUTO: 0.1 K/UL (ref 0–0.5)
EOSINOPHIL NFR BLD: 0.7 % (ref 0–8)
ERYTHROCYTE [DISTWIDTH] IN BLOOD BY AUTOMATED COUNT: 13.1 % (ref 11.5–14.5)
GLUCOSE SERPL-MCNC: 80 MG/DL (ref 70–140)
HCT VFR BLD AUTO: 30.2 % (ref 37–48.5)
HGB BLD-MCNC: 10.3 G/DL (ref 12–16)
IMM GRANULOCYTES # BLD AUTO: 0.08 K/UL (ref 0–0.04)
IMM GRANULOCYTES NFR BLD AUTO: 0.9 % (ref 0–0.5)
LYMPHOCYTES # BLD AUTO: 1.8 K/UL (ref 1–4.8)
LYMPHOCYTES NFR BLD: 21.5 % (ref 18–48)
MCH RBC QN AUTO: 29.5 PG (ref 27–31)
MCHC RBC AUTO-ENTMCNC: 34.1 G/DL (ref 32–36)
MCV RBC AUTO: 87 FL (ref 82–98)
MONOCYTES # BLD AUTO: 0.7 K/UL (ref 0.3–1)
MONOCYTES NFR BLD: 8.3 % (ref 4–15)
NEUTROPHILS # BLD AUTO: 5.8 K/UL (ref 1.8–7.7)
NEUTROPHILS NFR BLD: 68.2 % (ref 38–73)
NRBC BLD-RTO: 0 /100 WBC
PLATELET # BLD AUTO: 236 K/UL (ref 150–450)
PMV BLD AUTO: 10.2 FL (ref 9.2–12.9)
RBC # BLD AUTO: 3.49 M/UL (ref 4–5.4)
WBC # BLD AUTO: 8.48 K/UL (ref 3.9–12.7)

## 2023-07-10 PROCEDURE — 99213 PR OFFICE/OUTPT VISIT, EST, LEVL III, 20-29 MIN: ICD-10-PCS | Mod: TH,S$PBB,, | Performed by: ADVANCED PRACTICE MIDWIFE

## 2023-07-10 PROCEDURE — 36415 COLL VENOUS BLD VENIPUNCTURE: CPT | Performed by: ADVANCED PRACTICE MIDWIFE

## 2023-07-10 PROCEDURE — 99211 OFF/OP EST MAY X REQ PHY/QHP: CPT | Mod: PBBFAC,TH | Performed by: ADVANCED PRACTICE MIDWIFE

## 2023-07-10 PROCEDURE — 99999 PR PBB SHADOW E&M-EST. PATIENT-LVL I: ICD-10-PCS | Mod: PBBFAC,,, | Performed by: ADVANCED PRACTICE MIDWIFE

## 2023-07-10 PROCEDURE — 85025 COMPLETE CBC W/AUTO DIFF WBC: CPT | Performed by: ADVANCED PRACTICE MIDWIFE

## 2023-07-10 PROCEDURE — 99999 PR PBB SHADOW E&M-EST. PATIENT-LVL I: CPT | Mod: PBBFAC,,, | Performed by: ADVANCED PRACTICE MIDWIFE

## 2023-07-10 PROCEDURE — 99213 OFFICE O/P EST LOW 20 MIN: CPT | Mod: TH,S$PBB,, | Performed by: ADVANCED PRACTICE MIDWIFE

## 2023-07-10 PROCEDURE — 82950 GLUCOSE TEST: CPT | Performed by: ADVANCED PRACTICE MIDWIFE

## 2023-07-14 ENCOUNTER — PATIENT MESSAGE (OUTPATIENT)
Dept: OBSTETRICS AND GYNECOLOGY | Facility: CLINIC | Age: 23
End: 2023-07-14
Payer: MEDICAID

## 2023-07-22 ENCOUNTER — PATIENT MESSAGE (OUTPATIENT)
Dept: OTHER | Facility: OTHER | Age: 23
End: 2023-07-22
Payer: MEDICAID

## 2023-07-28 ENCOUNTER — PATIENT OUTREACH (OUTPATIENT)
Dept: EMERGENCY MEDICINE | Facility: HOSPITAL | Age: 23
End: 2023-07-28
Payer: MEDICAID

## 2023-07-28 NOTE — PROGRESS NOTES
Call placed to Pt to f/u from last encounter. No answer. ED navigator will follow-up with patient on/around 9-1-23.

## 2023-08-03 PROCEDURE — 99283 EMERGENCY DEPT VISIT LOW MDM: CPT

## 2023-08-04 ENCOUNTER — HOSPITAL ENCOUNTER (EMERGENCY)
Facility: HOSPITAL | Age: 23
Discharge: HOME | End: 2023-08-04
Attending: STUDENT IN AN ORGANIZED HEALTH CARE EDUCATION/TRAINING PROGRAM
Payer: MEDICAID

## 2023-08-04 VITALS
DIASTOLIC BLOOD PRESSURE: 60 MMHG | SYSTOLIC BLOOD PRESSURE: 115 MMHG | TEMPERATURE: 98 F | HEART RATE: 81 BPM | RESPIRATION RATE: 19 BRPM | OXYGEN SATURATION: 98 %

## 2023-08-04 DIAGNOSIS — O12.02 SWELLING OF LOWER EXTREMITY DURING PREGNANCY IN SECOND TRIMESTER: Primary | ICD-10-CM

## 2023-08-04 DIAGNOSIS — O23.42 UTI (URINARY TRACT INFECTION) DURING PREGNANCY, SECOND TRIMESTER: ICD-10-CM

## 2023-08-04 LAB
BILIRUB UR QL STRIP: NEGATIVE
BUN SERPL-MCNC: 5 MG/DL (ref 6–30)
CHLORIDE SERPL-SCNC: 105 MMOL/L (ref 95–110)
CLARITY UR REFRACT.AUTO: ABNORMAL
COLOR UR AUTO: YELLOW
CREAT SERPL-MCNC: 0.4 MG/DL (ref 0.5–1.4)
GLUCOSE SERPL-MCNC: 80 MG/DL (ref 70–110)
GLUCOSE UR QL STRIP: NEGATIVE
HCT VFR BLD CALC: 26 %PCV (ref 36–54)
HGB UR QL STRIP: NEGATIVE
KETONES UR QL STRIP: NEGATIVE
LEUKOCYTE ESTERASE UR QL STRIP: ABNORMAL
MICROSCOPIC COMMENT: ABNORMAL
NITRITE UR QL STRIP: NEGATIVE
PH UR STRIP: 7 [PH] (ref 5–8)
POC IONIZED CALCIUM: 1.22 MMOL/L (ref 1.06–1.42)
POC TCO2 (MEASURED): 21 MMOL/L (ref 23–29)
POTASSIUM BLD-SCNC: 3.6 MMOL/L (ref 3.5–5.1)
PROT UR QL STRIP: ABNORMAL
RBC #/AREA URNS AUTO: 1 /HPF (ref 0–4)
SAMPLE: ABNORMAL
SODIUM BLD-SCNC: 137 MMOL/L (ref 136–145)
SP GR UR STRIP: 1.02 (ref 1–1.03)
SQUAMOUS #/AREA URNS AUTO: 2 /HPF
UNSPECIFIED CRY UR QL COMP ASSIST: 2
URN SPEC COLLECT METH UR: ABNORMAL
WBC #/AREA URNS AUTO: 8 /HPF (ref 0–5)

## 2023-08-04 PROCEDURE — 81001 URINALYSIS AUTO W/SCOPE: CPT | Performed by: EMERGENCY MEDICINE

## 2023-08-04 RX ORDER — GRANULES FOR ORAL 3 G/1
3 POWDER ORAL ONCE
Qty: 3 G | Refills: 0 | Status: SHIPPED | OUTPATIENT
Start: 2023-08-04 | End: 2023-08-04

## 2023-08-04 NOTE — DISCHARGE INSTRUCTIONS
You are prescribed fosfomycin for potential UTI.  Please follow-up with your OBGYN within 1 week for re-evaluation.  Return to ED if you have worsening symptoms, shortness of breath, loss consciousness, or other concerns

## 2023-08-04 NOTE — ED PROVIDER NOTES
Encounter Date: 8/3/2023       History     Chief Complaint   Patient presents with    Foot Swelling     Patient pregnant w28 d3/7, first pregancy, BLE swelling, patient is a  and stands on feet all day     22 year old, 28 week gestational age, , presents to the ED for bilateral lower extremity edema.  Patient reports that she noticed to have bilateral lower extremity swelling recently, denies leg pain, short of breath, or chest pain.  Patient stated she worked a , standing up on her feet all day.  She tried leg elevation which shows some improvement.  Patient stated this is she concerning for preeclampsia.  Denies any vaginal discharge, bleeding, or contraction. endorses to have fetal movement.  Denies any people complication with current pregnancy.        Review of patient's allergies indicates:  No Known Allergies  History reviewed. No pertinent past medical history.  History reviewed. No pertinent surgical history.  Family History   Problem Relation Age of Onset    Breast cancer Neg Hx     Colon cancer Neg Hx     Ovarian cancer Neg Hx      Social History     Tobacco Use    Smoking status: Never    Smokeless tobacco: Never   Substance Use Topics    Alcohol use: Not Currently    Drug use: Never     Review of Systems    Physical Exam     Initial Vitals [23 2314]   BP Pulse Resp Temp SpO2   (!) 106/54 80 20 98.2 °F (36.8 °C) 99 %      MAP       --         Physical Exam    Nursing note and vitals reviewed.  Constitutional: She appears well-developed. No distress.   HENT:   Head: Normocephalic and atraumatic.   Mouth/Throat: Oropharynx is clear and moist.   Eyes: Conjunctivae and EOM are normal.   Neck: No JVD present.   Normal range of motion.  Cardiovascular:  Normal rate, regular rhythm, normal heart sounds and intact distal pulses.           Pulmonary/Chest: Breath sounds normal. No respiratory distress.   Abdominal: Abdomen is soft. She exhibits no distension. There is no abdominal  tenderness.   Musculoskeletal:         General: Edema (Trace edema bilateral lower extremities) present. No tenderness. Normal range of motion.      Cervical back: Normal range of motion.     Neurological: She is alert and oriented to person, place, and time. She has normal strength.   Skin: Skin is warm and dry. Capillary refill takes less than 2 seconds.         ED Course   Procedures  Labs Reviewed   URINALYSIS, REFLEX TO URINE CULTURE - Abnormal; Notable for the following components:       Result Value    Appearance, UA Hazy (*)     Protein, UA Trace (*)     Leukocytes, UA Trace (*)     All other components within normal limits    Narrative:     Specimen Source->Urine   URINALYSIS MICROSCOPIC - Abnormal; Notable for the following components:    WBC, UA 8 (*)     All other components within normal limits    Narrative:     Specimen Source->Urine   ISTAT PROCEDURE - Abnormal; Notable for the following components:    POC BUN 5 (*)     POC Creatinine 0.4 (*)     POC TCO2 (MEASURED) 21 (*)     POC Hematocrit 26 (*)     All other components within normal limits          Imaging Results    None          Medications - No data to display  Medical Decision Making:   History:   Old Medical Records: I decided to obtain old medical records.  Old Records Summarized: records from clinic visits.  Initial Assessment:   22 year old, 28 week gestational age, , presents to the ED for bilateral lower extremity edema.  Patient is well-appearing, afebrile, hemodynamically stable.  For.  Ultrasound was obtained, fetal heart rate is 150, fetal movement present, BPD consistent with 28 weeks gestational age.  Differential Diagnosis:   Leg swelling during pregnancy, dependent edema, doubt DVT, will evaluate for UTI, protein uria.   Clinical Tests:   Lab Tests: Ordered and Reviewed             ED Course as of 23 0902   Fri Aug 04, 2023   011 Patient declined formal labs but was agreeable to giving a urinalysis. [NN]   0306 Patient  has mild proteinuria however she is not hypertensive so I do not suspect that this is preeclampsia.  Patient declined additional formal labs as she has follow up with her OBGYN scheduled in 3 days. [NN]   0901 Patient prescribed fosfomycin for asymptomatic UTI during pregnancy. [NC]   0902 Patient is stable to discharge home, recommended to follow-up with her OBGYN.  Provided Return to ED precaution.  No other questions. [NC]      ED Course User Index  [NC] Cole Rizo MD  [NN] Nicky Morales MD                   Clinical Impression:   Final diagnoses:  [O12.02] Swelling of lower extremity during pregnancy in second trimester (Primary)  [O23.42] UTI (urinary tract infection) during pregnancy, second trimester        ED Disposition Condition    Discharge Stable          ED Prescriptions       Medication Sig Dispense Start Date End Date Auth. Provider    fosfomycin (MONUROL) 3 gram Pack (Expires today) Take 3 g by mouth once. for 1 dose 3 g 8/4/2023 8/4/2023 Cole Rizo MD          Follow-up Information       Follow up With Specialties Details Why Contact Info    [facility] - Emergency Dept Emergency Medicine  As needed [id number]  [facility] [address] [phone]    OBGYN                 Cole Rizo MD  Resident  08/04/23 0902

## 2023-08-04 NOTE — ED NOTES
I-STAT Chem-8+ Results:   Value Reference Range   Sodium 137 136-145 mmol/L   Potassium  3.6 3.5-5.1 mmol/L   Chloride 105  mmol/L   Ionized Calcium 1.22 1.06-1.42 mmol/L   CO2 (measured) 21 23-29 mmol/L   Glucose 80  mg/dL   BUN 5 6-30 mg/dL   Creatinine 0.4 0.5-1.4 mg/dL   Hematocrit 26 36-54%

## 2023-08-04 NOTE — ED TRIAGE NOTES
Antonio Holt, a 22 y.o. female presents to the ED w/ complaint of bilateral feet swelling. Pt 28 weeks pregnant and after being on her feet all day at worked noticed swelling. Pt reports swelling is worse than ever before. Denies SOB, CP.    Triage note:  Chief Complaint   Patient presents with    Foot Swelling     Patient pregnant w28 d3/7, first pregancy, BLE swelling, patient is a  and stands on feet all day     Review of patient's allergies indicates:  No Known Allergies  No past medical history on file.

## 2023-08-07 ENCOUNTER — PATIENT OUTREACH (OUTPATIENT)
Dept: EMERGENCY MEDICINE | Facility: HOSPITAL | Age: 23
End: 2023-08-07
Payer: MEDICAID

## 2023-08-07 NOTE — PROGRESS NOTES
Call placed to Pt to f/u from last encounter and most recent ER visit. Pt states everything is ok and denies needing any assistance at this time. Pt encouraged to keep f/u as scheduled on 8-8-23.      50 y/o male presents with trauma to the right foot since 1 week ago. Given presentation, will repeat X-ray of the right foot. Will give Motrin for pain relief and reassess.

## 2023-08-07 NOTE — PROGRESS NOTES
Call placed to Pt to remind of her upcoming appt on 8-8-23 at 11am with [facility] Yanique Vance at [facility]. Pt verbalized understanding.

## 2023-08-08 ENCOUNTER — ROUTINE PRENATAL (OUTPATIENT)
Dept: OBSTETRICS AND GYNECOLOGY | Facility: CLINIC | Age: 23
End: 2023-08-08
Payer: MEDICAID

## 2023-08-08 VITALS — DIASTOLIC BLOOD PRESSURE: 70 MMHG | WEIGHT: 183 LBS | SYSTOLIC BLOOD PRESSURE: 124 MMHG | BODY MASS INDEX: 31.41 KG/M2

## 2023-08-08 DIAGNOSIS — Z23 NEED FOR DIPHTHERIA-TETANUS-PERTUSSIS (TDAP) VACCINE: ICD-10-CM

## 2023-08-08 PROCEDURE — 90715 TDAP VACCINE 7 YRS/> IM: CPT | Mod: PBBFAC,PN

## 2023-08-08 PROCEDURE — 99999PBSHW TDAP VACCINE GREATER THAN OR EQUAL TO 7YO IM: ICD-10-PCS | Mod: PBBFAC,,,

## 2023-08-08 PROCEDURE — 99999 PR PBB SHADOW E&M-EST. PATIENT-LVL II: ICD-10-PCS | Mod: PBBFAC,,, | Performed by: OBSTETRICS & GYNECOLOGY

## 2023-08-08 PROCEDURE — 90471 IMMUNIZATION ADMIN: CPT | Mod: PBBFAC,PN

## 2023-08-08 PROCEDURE — 99999 PR PBB SHADOW E&M-EST. PATIENT-LVL II: CPT | Mod: PBBFAC,,, | Performed by: OBSTETRICS & GYNECOLOGY

## 2023-08-08 PROCEDURE — 99999PBSHW TDAP VACCINE GREATER THAN OR EQUAL TO 7YO IM: Mod: PBBFAC,,,

## 2023-08-08 PROCEDURE — 99213 PR OFFICE/OUTPT VISIT, EST, LEVL III, 20-29 MIN: ICD-10-PCS | Mod: TH,S$PBB,, | Performed by: OBSTETRICS & GYNECOLOGY

## 2023-08-08 PROCEDURE — 99212 OFFICE O/P EST SF 10 MIN: CPT | Mod: PBBFAC,PN | Performed by: OBSTETRICS & GYNECOLOGY

## 2023-08-08 PROCEDURE — 99213 OFFICE O/P EST LOW 20 MIN: CPT | Mod: TH,S$PBB,, | Performed by: OBSTETRICS & GYNECOLOGY

## 2023-08-08 NOTE — LETTER
August 8, 2023      [facility], [facility]  [address]  [facility] [address]  Phone: [phone]  Fax: [phone]       Patient: Antonio Holt   YOB: 2000  Date of Visit: 08/08/2023    To Whom It May Concern:    Kevin Holt  was at [facility] on 08/08/2023. Please allow the patient to sit for 15 minutes every 3-4 hours. If you have any questions or concerns, or if I can be of further assistance, please do not hesitate to contact me.    Sincerely,    Merissa Montoya MD

## 2023-08-08 NOTE — PROGRESS NOTES
Pregnancy dating, labs, ultrasound reports, prenatal testing, and problem list; prior records and results; and available outside records were reviewed and updated in EMR.  Pertinent findings were noted below.    Reason for Visit   Routine Prenatal Visit    HPI   22 y.o., at 28w3d by Estimated Date of Delivery: 10/28/23    Went to [facility] for lower leg swelling, stands for long periods of time at work ([employer] @ [employer])    Contractions: No   Bleeding: No   Loss of fluid: No   Fetal movement: Yes   Nausea: No   Vomiting: No   Headache: No     Exam   /70   Wt 83 kg (182 lb 15.7 oz)   LMP 2023   BMI 31.41 kg/m²     TW#  GENERAL: No acute distress  ABD: Gravid  EXT: symmetric LLE, mild    Assessment and Plan   Encounter for lactation counseling  -     BREAST PUMP FOR HOME USE    Need for diphtheria-tetanus-pertussis (Tdap) vaccine  -     (In Office Administered) Tdap Vaccine        Contraceptive counseling performed, desires pills  Plans on breast + bottle. Rx for breast pump and location to  provided  Note for work to take intermittent breaks  FMLA form provided for work  Peds list given  Tdap administered in office today     labor and fetal movement count precautions given  Follow-up: 2 weeks

## 2023-08-13 ENCOUNTER — PATIENT MESSAGE (OUTPATIENT)
Dept: OBSTETRICS AND GYNECOLOGY | Facility: CLINIC | Age: 23
End: 2023-08-13
Payer: MEDICAID

## 2023-08-13 DIAGNOSIS — Z34.82 ENCOUNTER FOR SUPERVISION OF OTHER NORMAL PREGNANCY IN SECOND TRIMESTER: ICD-10-CM

## 2023-08-13 DIAGNOSIS — O99.012 ANEMIA AFFECTING PREGNANCY IN SECOND TRIMESTER: ICD-10-CM

## 2023-08-14 RX ORDER — PRENATAL WITH FERROUS FUM AND FOLIC ACID 3080; 920; 120; 400; 22; 1.84; 3; 20; 10; 1; 12; 200; 27; 25; 2 [IU]/1; [IU]/1; MG/1; [IU]/1; MG/1; MG/1; MG/1; MG/1; MG/1; MG/1; UG/1; MG/1; MG/1; MG/1; MG/1
1 TABLET ORAL DAILY
Qty: 30 TABLET | Refills: 11 | Status: ON HOLD | OUTPATIENT
Start: 2023-08-14 | End: 2023-12-07

## 2023-08-14 RX ORDER — FERROUS SULFATE 325(65) MG
325 TABLET, DELAYED RELEASE (ENTERIC COATED) ORAL
Qty: 30 TABLET | Refills: 10 | Status: ON HOLD | OUTPATIENT
Start: 2023-08-14 | End: 2023-12-07

## 2023-08-19 ENCOUNTER — PATIENT MESSAGE (OUTPATIENT)
Dept: OTHER | Facility: OTHER | Age: 23
End: 2023-08-19
Payer: MEDICAID

## 2023-09-02 ENCOUNTER — PATIENT MESSAGE (OUTPATIENT)
Dept: OTHER | Facility: OTHER | Age: 23
End: 2023-09-02
Payer: MEDICAID

## 2023-09-05 ENCOUNTER — ROUTINE PRENATAL (OUTPATIENT)
Dept: OBSTETRICS AND GYNECOLOGY | Facility: CLINIC | Age: 23
End: 2023-09-05
Attending: OBSTETRICS & GYNECOLOGY
Payer: MEDICAID

## 2023-09-05 ENCOUNTER — LAB VISIT (OUTPATIENT)
Dept: LAB | Facility: OTHER | Age: 23
End: 2023-09-05
Attending: OBSTETRICS & GYNECOLOGY
Payer: MEDICAID

## 2023-09-05 VITALS
SYSTOLIC BLOOD PRESSURE: 119 MMHG | DIASTOLIC BLOOD PRESSURE: 65 MMHG | WEIGHT: 188.69 LBS | BODY MASS INDEX: 32.39 KG/M2

## 2023-09-05 DIAGNOSIS — Z34.82 ENCOUNTER FOR SUPERVISION OF OTHER NORMAL PREGNANCY IN SECOND TRIMESTER: Primary | ICD-10-CM

## 2023-09-05 DIAGNOSIS — O99.013 ANEMIA AFFECTING PREGNANCY IN THIRD TRIMESTER: ICD-10-CM

## 2023-09-05 DIAGNOSIS — R19.7 DIARRHEA, UNSPECIFIED TYPE: ICD-10-CM

## 2023-09-05 LAB
BASOPHILS # BLD AUTO: 0.02 K/UL (ref 0–0.2)
BASOPHILS NFR BLD: 0.3 % (ref 0–1.9)
DIFFERENTIAL METHOD: ABNORMAL
EOSINOPHIL # BLD AUTO: 0.1 K/UL (ref 0–0.5)
EOSINOPHIL NFR BLD: 0.7 % (ref 0–8)
ERYTHROCYTE [DISTWIDTH] IN BLOOD BY AUTOMATED COUNT: 12.7 % (ref 11.5–14.5)
FERRITIN SERPL-MCNC: 27 NG/ML (ref 20–300)
HCT VFR BLD AUTO: 31 % (ref 37–48.5)
HGB BLD-MCNC: 10.7 G/DL (ref 12–16)
IMM GRANULOCYTES # BLD AUTO: 0.07 K/UL (ref 0–0.04)
IMM GRANULOCYTES NFR BLD AUTO: 0.9 % (ref 0–0.5)
IRON SERPL-MCNC: 86 UG/DL (ref 30–160)
LYMPHOCYTES # BLD AUTO: 1.6 K/UL (ref 1–4.8)
LYMPHOCYTES NFR BLD: 21.5 % (ref 18–48)
MCH RBC QN AUTO: 29.9 PG (ref 27–31)
MCHC RBC AUTO-ENTMCNC: 34.5 G/DL (ref 32–36)
MCV RBC AUTO: 87 FL (ref 82–98)
MONOCYTES # BLD AUTO: 0.7 K/UL (ref 0.3–1)
MONOCYTES NFR BLD: 9.8 % (ref 4–15)
NEUTROPHILS # BLD AUTO: 5 K/UL (ref 1.8–7.7)
NEUTROPHILS NFR BLD: 66.8 % (ref 38–73)
NRBC BLD-RTO: 0 /100 WBC
PLATELET # BLD AUTO: 223 K/UL (ref 150–450)
PMV BLD AUTO: 10 FL (ref 9.2–12.9)
RBC # BLD AUTO: 3.58 M/UL (ref 4–5.4)
SATURATED IRON: 17 % (ref 20–50)
TOTAL IRON BINDING CAPACITY: 508 UG/DL (ref 250–450)
TRANSFERRIN SERPL-MCNC: 343 MG/DL (ref 200–375)
WBC # BLD AUTO: 7.52 K/UL (ref 3.9–12.7)

## 2023-09-05 PROCEDURE — 83540 ASSAY OF IRON: CPT | Performed by: OBSTETRICS & GYNECOLOGY

## 2023-09-05 PROCEDURE — 82728 ASSAY OF FERRITIN: CPT | Performed by: OBSTETRICS & GYNECOLOGY

## 2023-09-05 PROCEDURE — 85025 COMPLETE CBC W/AUTO DIFF WBC: CPT | Performed by: OBSTETRICS & GYNECOLOGY

## 2023-09-05 PROCEDURE — 99213 OFFICE O/P EST LOW 20 MIN: CPT | Mod: TH,S$PBB,, | Performed by: OBSTETRICS & GYNECOLOGY

## 2023-09-05 PROCEDURE — 99999 PR PBB SHADOW E&M-EST. PATIENT-LVL II: CPT | Mod: PBBFAC,,, | Performed by: OBSTETRICS & GYNECOLOGY

## 2023-09-05 PROCEDURE — 99999 PR PBB SHADOW E&M-EST. PATIENT-LVL II: ICD-10-PCS | Mod: PBBFAC,,, | Performed by: OBSTETRICS & GYNECOLOGY

## 2023-09-05 PROCEDURE — 99212 OFFICE O/P EST SF 10 MIN: CPT | Mod: PBBFAC,TH,PN | Performed by: OBSTETRICS & GYNECOLOGY

## 2023-09-05 PROCEDURE — 84466 ASSAY OF TRANSFERRIN: CPT | Performed by: OBSTETRICS & GYNECOLOGY

## 2023-09-05 PROCEDURE — 36415 COLL VENOUS BLD VENIPUNCTURE: CPT | Performed by: OBSTETRICS & GYNECOLOGY

## 2023-09-05 PROCEDURE — 99213 PR OFFICE/OUTPT VISIT, EST, LEVL III, 20-29 MIN: ICD-10-PCS | Mod: TH,S$PBB,, | Performed by: OBSTETRICS & GYNECOLOGY

## 2023-09-05 NOTE — PROGRESS NOTES
Complaints today:diarrhea for 2-4 weeks.  She is taking her pnv and extra iron. She was told at the [facility] office that she is very anemic, Good fetal movements reported. No Bleeding or pains    /65   Wt 85.6 kg (188 lb 11.4 oz)   LMP 2023   BMI 32.39 kg/m²     22 y.o., at 32w3d by Estimated Date of Delivery: 10/28/23  Patient Active Problem List   Diagnosis    Encounter for supervision of normal pregnancy in second trimester    Anemia affecting pregnancy in third trimester     OB History    Para Term  AB Living   2       1     SAB IAB Ectopic Multiple Live Births   1              # Outcome Date GA Lbr Pollo/2nd Weight Sex Delivery Anes PTL Lv   2 Current            1 2017     SAB         Birth Comments: 20w       Dating reviewed    Allergies and problem list reviewed and updated    Medical and surgical history reviewed    Prenatal labs reviewed and updated    Physical Exam:  ABD: soft, gravid, nontender,     Assessment:  Antonio was seen today for routine prenatal visit and diarrhea.    Diagnoses and all orders for this visit:    Encounter for supervision of other normal pregnancy in second trimester    Anemia affecting pregnancy in third trimester  -     CBC Auto Differential; Future  -     Iron and TIBC; Future  -     Ferritin; Future    Diarrhea, unspecified type  -     CLOSTRIDIUM DIFFICILE; Future  -     Stool Exam-Ova,Cysts,Parasites; Future         Plan:   follow up labs.  Instructed on the BRAT diet.  If iron deficiency will need IV iron.   follow up 2 Weeks, bleeding/pain precautions  kick counts, labor precautions

## 2023-09-07 ENCOUNTER — PATIENT MESSAGE (OUTPATIENT)
Dept: OBSTETRICS AND GYNECOLOGY | Facility: CLINIC | Age: 23
End: 2023-09-07
Payer: MEDICAID

## 2023-09-07 DIAGNOSIS — O99.013 ANEMIA AFFECTING PREGNANCY IN THIRD TRIMESTER: Primary | ICD-10-CM

## 2023-09-07 RX ORDER — DIPHENHYDRAMINE HYDROCHLORIDE 50 MG/ML
50 INJECTION INTRAMUSCULAR; INTRAVENOUS ONCE AS NEEDED
Status: CANCELLED | OUTPATIENT
Start: 2023-09-08

## 2023-09-07 RX ORDER — EPINEPHRINE 0.3 MG/.3ML
0.3 INJECTION SUBCUTANEOUS ONCE AS NEEDED
Status: CANCELLED | OUTPATIENT
Start: 2023-09-08

## 2023-09-07 RX ORDER — HEPARIN 100 UNIT/ML
500 SYRINGE INTRAVENOUS
Status: CANCELLED | OUTPATIENT
Start: 2023-09-08

## 2023-09-07 RX ORDER — SODIUM CHLORIDE 0.9 % (FLUSH) 0.9 %
10 SYRINGE (ML) INJECTION
Status: CANCELLED | OUTPATIENT
Start: 2023-09-08

## 2023-09-18 ENCOUNTER — ROUTINE PRENATAL (OUTPATIENT)
Dept: OBSTETRICS AND GYNECOLOGY | Facility: CLINIC | Age: 23
End: 2023-09-18
Payer: MEDICAID

## 2023-09-18 VITALS
BODY MASS INDEX: 33.15 KG/M2 | DIASTOLIC BLOOD PRESSURE: 64 MMHG | SYSTOLIC BLOOD PRESSURE: 120 MMHG | WEIGHT: 193.13 LBS

## 2023-09-18 DIAGNOSIS — Z34.03 ENCOUNTER FOR SUPERVISION OF NORMAL FIRST PREGNANCY IN THIRD TRIMESTER: Primary | ICD-10-CM

## 2023-09-18 PROCEDURE — 99999 PR PBB SHADOW E&M-EST. PATIENT-LVL II: ICD-10-PCS | Mod: PBBFAC,,, | Performed by: ADVANCED PRACTICE MIDWIFE

## 2023-09-18 PROCEDURE — 99212 OFFICE O/P EST SF 10 MIN: CPT | Mod: PBBFAC,TH,PN | Performed by: ADVANCED PRACTICE MIDWIFE

## 2023-09-18 PROCEDURE — 99999 PR PBB SHADOW E&M-EST. PATIENT-LVL II: CPT | Mod: PBBFAC,,, | Performed by: ADVANCED PRACTICE MIDWIFE

## 2023-09-18 PROCEDURE — 99213 PR OFFICE/OUTPT VISIT, EST, LEVL III, 20-29 MIN: ICD-10-PCS | Mod: S$PBB,TH,, | Performed by: ADVANCED PRACTICE MIDWIFE

## 2023-09-18 PROCEDURE — 99213 OFFICE O/P EST LOW 20 MIN: CPT | Mod: S$PBB,TH,, | Performed by: ADVANCED PRACTICE MIDWIFE

## 2023-09-18 NOTE — PROGRESS NOTES
Reason for visit: Routine Prenatal Visit      HPI:   22 y.o., at 34w2d by Estimated Date of Delivery: 10/28/23    In with no c/o    - Contractions: denies  - Bleeding: denies  - Loss of fluid: denies  - Fetal movement: reports good Fm, reinforced BId  - Nausea: no  - Vomiting: no  - Headache: no      Reviewed:    Past medical, surgical, social, family, and obstetric history: Reviewed and updated in EMR.  Medications: Reviewed and updated in EMR.  Allergies: Patient has no known allergies.    Pregnancy dating, labs, ultrasound reports, prenatal testing, and problem list: Reviewed and updated in EMR.  Outside records: na  Independent interpretation of tests: na  Discussion with another healthcare professional: na      Vitals: /64   Wt 87.6 kg (193 lb 2 oz)   LMP 2023   BMI 33.15 kg/m²     Physical exam:  GENERAL: No acute distress  ABD: Gravid      Assessment and Plan:    Encounter for supervision of normal first pregnancy in third trimester         Scheduled appt for venofer- instructions given     labor precautions given  Follow-up: 2 weeks      I spent a total of 20 minutes on the day of the visit. This includes face to face time and non-face to face time preparing to see the patient (eg, review of tests), Obtaining and/or reviewing separately obtained history, Documenting clinical information in the electronic or other health record, Independently interpreting results and communicating results to the patient/family/caregiver, or Care coordination.

## 2023-09-20 ENCOUNTER — PROCEDURE VISIT (OUTPATIENT)
Dept: MATERNAL FETAL MEDICINE | Facility: CLINIC | Age: 23
End: 2023-09-20
Payer: MEDICAID

## 2023-09-20 ENCOUNTER — INFUSION (OUTPATIENT)
Dept: INFUSION THERAPY | Facility: OTHER | Age: 23
End: 2023-09-20
Attending: ADVANCED PRACTICE MIDWIFE
Payer: MEDICAID

## 2023-09-20 VITALS
SYSTOLIC BLOOD PRESSURE: 120 MMHG | RESPIRATION RATE: 16 BRPM | HEART RATE: 82 BPM | TEMPERATURE: 98 F | OXYGEN SATURATION: 99 % | DIASTOLIC BLOOD PRESSURE: 58 MMHG

## 2023-09-20 DIAGNOSIS — O99.013 ANEMIA AFFECTING PREGNANCY IN THIRD TRIMESTER: Primary | ICD-10-CM

## 2023-09-20 DIAGNOSIS — Z34.03 ENCOUNTER FOR SUPERVISION OF NORMAL FIRST PREGNANCY IN THIRD TRIMESTER: ICD-10-CM

## 2023-09-20 PROCEDURE — 25000003 PHARM REV CODE 250: Performed by: OBSTETRICS & GYNECOLOGY

## 2023-09-20 PROCEDURE — 96374 THER/PROPH/DIAG INJ IV PUSH: CPT

## 2023-09-20 PROCEDURE — 63600175 PHARM REV CODE 636 W HCPCS: Performed by: OBSTETRICS & GYNECOLOGY

## 2023-09-20 PROCEDURE — 76816 OB US FOLLOW-UP PER FETUS: CPT | Mod: PBBFAC | Performed by: OBSTETRICS & GYNECOLOGY

## 2023-09-20 PROCEDURE — 76816 US MFM PROCEDURE (VIEWPOINT): ICD-10-PCS | Mod: 26,S$PBB,, | Performed by: OBSTETRICS & GYNECOLOGY

## 2023-09-20 RX ORDER — EPINEPHRINE 0.3 MG/.3ML
0.3 INJECTION SUBCUTANEOUS ONCE AS NEEDED
Status: DISCONTINUED | OUTPATIENT
Start: 2023-09-20 | End: 2023-09-20 | Stop reason: HOSPADM

## 2023-09-20 RX ORDER — EPINEPHRINE 0.3 MG/.3ML
0.3 INJECTION SUBCUTANEOUS ONCE AS NEEDED
Status: CANCELLED | OUTPATIENT
Start: 2023-09-27

## 2023-09-20 RX ORDER — DIPHENHYDRAMINE HYDROCHLORIDE 50 MG/ML
50 INJECTION INTRAMUSCULAR; INTRAVENOUS ONCE AS NEEDED
Status: CANCELLED | OUTPATIENT
Start: 2023-09-27

## 2023-09-20 RX ORDER — HEPARIN 100 UNIT/ML
500 SYRINGE INTRAVENOUS
Status: DISCONTINUED | OUTPATIENT
Start: 2023-09-20 | End: 2023-09-20 | Stop reason: HOSPADM

## 2023-09-20 RX ORDER — SODIUM CHLORIDE 0.9 % (FLUSH) 0.9 %
10 SYRINGE (ML) INJECTION
Status: CANCELLED | OUTPATIENT
Start: 2023-09-27

## 2023-09-20 RX ORDER — DIPHENHYDRAMINE HYDROCHLORIDE 50 MG/ML
50 INJECTION INTRAMUSCULAR; INTRAVENOUS ONCE AS NEEDED
Status: DISCONTINUED | OUTPATIENT
Start: 2023-09-20 | End: 2023-09-20 | Stop reason: HOSPADM

## 2023-09-20 RX ORDER — SODIUM CHLORIDE 0.9 % (FLUSH) 0.9 %
10 SYRINGE (ML) INJECTION
Status: DISCONTINUED | OUTPATIENT
Start: 2023-09-20 | End: 2023-09-20 | Stop reason: HOSPADM

## 2023-09-20 RX ORDER — HEPARIN 100 UNIT/ML
500 SYRINGE INTRAVENOUS
Status: CANCELLED | OUTPATIENT
Start: 2023-09-27

## 2023-09-20 RX ADMIN — SODIUM CHLORIDE: 9 INJECTION, SOLUTION INTRAVENOUS at 02:09

## 2023-09-20 RX ADMIN — IRON SUCROSE 100 MG: 20 INJECTION, SOLUTION INTRAVENOUS at 02:09

## 2023-09-20 NOTE — LETTER
September 20, 2023      [facility]  [address]  [facility] [address]  Phone: [phone]       Patient: Antonio Holt   YOB: 2000  Date of Visit: 09/20/2023    To Whom It May Concern:    Kevin Holt  was at [facility] on 09/20/2023. The patient may return to work/school with no restrictions. If you have any questions or concerns, or if I can be of further assistance, please do not hesitate to contact me.    Sincerely,    Latoya Sharma RN

## 2023-09-20 NOTE — PLAN OF CARE
IVP Venofer infusion complete. Pt tolerated well. VSS. NAD. IV DC'd. Pt verbalized understanding of discharge instructions before leaving.

## 2023-09-23 ENCOUNTER — PATIENT MESSAGE (OUTPATIENT)
Dept: OTHER | Facility: OTHER | Age: 23
End: 2023-09-23
Payer: MEDICAID

## 2023-09-28 ENCOUNTER — INFUSION (OUTPATIENT)
Dept: INFUSION THERAPY | Facility: OTHER | Age: 23
End: 2023-09-28
Attending: ADVANCED PRACTICE MIDWIFE
Payer: MEDICAID

## 2023-09-28 VITALS
DIASTOLIC BLOOD PRESSURE: 61 MMHG | TEMPERATURE: 98 F | RESPIRATION RATE: 16 BRPM | OXYGEN SATURATION: 100 % | HEART RATE: 80 BPM | SYSTOLIC BLOOD PRESSURE: 115 MMHG

## 2023-09-28 DIAGNOSIS — O99.013 ANEMIA AFFECTING PREGNANCY IN THIRD TRIMESTER: Primary | ICD-10-CM

## 2023-09-28 PROCEDURE — 25000003 PHARM REV CODE 250: Performed by: OBSTETRICS & GYNECOLOGY

## 2023-09-28 PROCEDURE — 63600175 PHARM REV CODE 636 W HCPCS: Performed by: OBSTETRICS & GYNECOLOGY

## 2023-09-28 PROCEDURE — 96374 THER/PROPH/DIAG INJ IV PUSH: CPT

## 2023-09-28 RX ORDER — DIPHENHYDRAMINE HYDROCHLORIDE 50 MG/ML
50 INJECTION INTRAMUSCULAR; INTRAVENOUS ONCE AS NEEDED
Status: CANCELLED | OUTPATIENT
Start: 2023-10-04

## 2023-09-28 RX ORDER — SODIUM CHLORIDE 0.9 % (FLUSH) 0.9 %
10 SYRINGE (ML) INJECTION
Status: DISCONTINUED | OUTPATIENT
Start: 2023-09-28 | End: 2023-09-28 | Stop reason: HOSPADM

## 2023-09-28 RX ORDER — HEPARIN 100 UNIT/ML
500 SYRINGE INTRAVENOUS
Status: CANCELLED | OUTPATIENT
Start: 2023-10-04

## 2023-09-28 RX ORDER — EPINEPHRINE 0.3 MG/.3ML
0.3 INJECTION SUBCUTANEOUS ONCE AS NEEDED
Status: CANCELLED | OUTPATIENT
Start: 2023-10-04

## 2023-09-28 RX ORDER — EPINEPHRINE 0.3 MG/.3ML
0.3 INJECTION SUBCUTANEOUS ONCE AS NEEDED
Status: DISCONTINUED | OUTPATIENT
Start: 2023-09-28 | End: 2023-09-28 | Stop reason: HOSPADM

## 2023-09-28 RX ORDER — DIPHENHYDRAMINE HYDROCHLORIDE 50 MG/ML
50 INJECTION INTRAMUSCULAR; INTRAVENOUS ONCE AS NEEDED
Status: DISCONTINUED | OUTPATIENT
Start: 2023-09-28 | End: 2023-09-28 | Stop reason: HOSPADM

## 2023-09-28 RX ORDER — HEPARIN 100 UNIT/ML
500 SYRINGE INTRAVENOUS
Status: DISCONTINUED | OUTPATIENT
Start: 2023-09-28 | End: 2023-09-28 | Stop reason: HOSPADM

## 2023-09-28 RX ORDER — SODIUM CHLORIDE 0.9 % (FLUSH) 0.9 %
10 SYRINGE (ML) INJECTION
Status: CANCELLED | OUTPATIENT
Start: 2023-10-04

## 2023-09-28 RX ADMIN — SODIUM CHLORIDE: 9 INJECTION, SOLUTION INTRAVENOUS at 01:09

## 2023-09-28 RX ADMIN — IRON SUCROSE 100 MG: 20 INJECTION, SOLUTION INTRAVENOUS at 02:09

## 2023-09-28 NOTE — PLAN OF CARE
Patient tolerated her iron infusion of Venofer. Reports no discomfort. VSS. NAD. Discussed discharge instructions. Verbalized understanding. No other questions asked. Patient discharged to home per self.

## 2023-10-02 ENCOUNTER — ROUTINE PRENATAL (OUTPATIENT)
Dept: OBSTETRICS AND GYNECOLOGY | Facility: CLINIC | Age: 23
End: 2023-10-02
Payer: MEDICAID

## 2023-10-02 ENCOUNTER — PROCEDURE VISIT (OUTPATIENT)
Dept: OBSTETRICS AND GYNECOLOGY | Facility: CLINIC | Age: 23
End: 2023-10-02
Payer: MEDICAID

## 2023-10-02 VITALS
SYSTOLIC BLOOD PRESSURE: 101 MMHG | BODY MASS INDEX: 33.76 KG/M2 | WEIGHT: 196.63 LBS | DIASTOLIC BLOOD PRESSURE: 70 MMHG

## 2023-10-02 DIAGNOSIS — Z34.03 ENCOUNTER FOR SUPERVISION OF NORMAL FIRST PREGNANCY IN THIRD TRIMESTER: Primary | ICD-10-CM

## 2023-10-02 DIAGNOSIS — Z34.03 ENCOUNTER FOR SUPERVISION OF NORMAL FIRST PREGNANCY IN THIRD TRIMESTER: ICD-10-CM

## 2023-10-02 LAB
BASOPHILS # BLD AUTO: 0.02 K/UL (ref 0–0.2)
BASOPHILS NFR BLD: 0.2 % (ref 0–1.9)
DIFFERENTIAL METHOD: ABNORMAL
EOSINOPHIL # BLD AUTO: 0.1 K/UL (ref 0–0.5)
EOSINOPHIL NFR BLD: 0.7 % (ref 0–8)
ERYTHROCYTE [DISTWIDTH] IN BLOOD BY AUTOMATED COUNT: 13.2 % (ref 11.5–14.5)
HCT VFR BLD AUTO: 32.6 % (ref 37–48.5)
HGB BLD-MCNC: 11 G/DL (ref 12–16)
HIV 1+2 AB+HIV1 P24 AG SERPL QL IA: NORMAL
IMM GRANULOCYTES # BLD AUTO: 0.1 K/UL (ref 0–0.04)
IMM GRANULOCYTES NFR BLD AUTO: 1.2 % (ref 0–0.5)
LYMPHOCYTES # BLD AUTO: 1.5 K/UL (ref 1–4.8)
LYMPHOCYTES NFR BLD: 17.5 % (ref 18–48)
MCH RBC QN AUTO: 29.6 PG (ref 27–31)
MCHC RBC AUTO-ENTMCNC: 33.7 G/DL (ref 32–36)
MCV RBC AUTO: 88 FL (ref 82–98)
MONOCYTES # BLD AUTO: 0.8 K/UL (ref 0.3–1)
MONOCYTES NFR BLD: 9.1 % (ref 4–15)
NEUTROPHILS # BLD AUTO: 6.2 K/UL (ref 1.8–7.7)
NEUTROPHILS NFR BLD: 71.3 % (ref 38–73)
NRBC BLD-RTO: 0 /100 WBC
PLATELET # BLD AUTO: 249 K/UL (ref 150–450)
PMV BLD AUTO: 10.3 FL (ref 9.2–12.9)
RBC # BLD AUTO: 3.72 M/UL (ref 4–5.4)
WBC # BLD AUTO: 8.64 K/UL (ref 3.9–12.7)

## 2023-10-02 PROCEDURE — 99999 PR PBB SHADOW E&M-EST. PATIENT-LVL II: CPT | Mod: PBBFAC,,, | Performed by: ADVANCED PRACTICE MIDWIFE

## 2023-10-02 PROCEDURE — 87389 HIV-1 AG W/HIV-1&-2 AB AG IA: CPT | Mod: 59 | Performed by: ADVANCED PRACTICE MIDWIFE

## 2023-10-02 PROCEDURE — 99213 OFFICE O/P EST LOW 20 MIN: CPT | Mod: S$PBB,TH,, | Performed by: ADVANCED PRACTICE MIDWIFE

## 2023-10-02 PROCEDURE — 99999 PR PBB SHADOW E&M-EST. PATIENT-LVL II: ICD-10-PCS | Mod: PBBFAC,,, | Performed by: ADVANCED PRACTICE MIDWIFE

## 2023-10-02 PROCEDURE — 87147 CULTURE TYPE IMMUNOLOGIC: CPT | Performed by: ADVANCED PRACTICE MIDWIFE

## 2023-10-02 PROCEDURE — 85025 COMPLETE CBC W/AUTO DIFF WBC: CPT | Performed by: ADVANCED PRACTICE MIDWIFE

## 2023-10-02 PROCEDURE — 86592 SYPHILIS TEST NON-TREP QUAL: CPT | Performed by: ADVANCED PRACTICE MIDWIFE

## 2023-10-02 PROCEDURE — 99213 PR OFFICE/OUTPT VISIT, EST, LEVL III, 20-29 MIN: ICD-10-PCS | Mod: S$PBB,TH,, | Performed by: ADVANCED PRACTICE MIDWIFE

## 2023-10-02 PROCEDURE — 99212 OFFICE O/P EST SF 10 MIN: CPT | Mod: PBBFAC,TH,PN | Performed by: ADVANCED PRACTICE MIDWIFE

## 2023-10-02 PROCEDURE — 87081 CULTURE SCREEN ONLY: CPT | Performed by: ADVANCED PRACTICE MIDWIFE

## 2023-10-02 NOTE — PROGRESS NOTES
Pregnancy dating, labs, ultrasound reports, prenatal testing, and problem list; prior records and results; and available outside records were reviewed and updated in EMR.  Pertinent findings were noted below.    Reason for Visit   Routine Prenatal Visit    HPI   22 y.o., at 36w2d by Estimated Date of Delivery: 10/28/23      Contractions: No   Bleeding: No   Loss of fluid: No   Fetal movement: Yes   Nausea: No   Vomiting: No   Headache: No     Exam   /70   Wt 89.2 kg (196 lb 10.4 oz)   LMP 02/17/2023   BMI 33.76 kg/m²     GENERAL: No acute distress  ABD: Gravid    Assessment and Plan   Encounter for supervision of normal first pregnancy in third trimester  -     RPR; Future; Expected date: 10/02/2023  -     HIV 1/2 Ag/Ab (4th Gen); Future; Expected date: 10/02/2023  -     CBC Auto Differential; Future; Expected date: 10/02/2023  -     Strep B Screen, Vaginal / Rectal  -     IP OB Labor Induction; Future       GBS and 3T labs collected today   Consents signed for delivery, blood, and circumcision   IOL request placed for 10/30    Labor and Pain and bleeding precautions given  Follow-up: 1 week

## 2023-10-03 LAB — RPR SER QL: NORMAL

## 2023-10-04 LAB — BACTERIA SPEC AEROBE CULT: ABNORMAL

## 2023-10-05 ENCOUNTER — INFUSION (OUTPATIENT)
Dept: INFUSION THERAPY | Facility: OTHER | Age: 23
End: 2023-10-05
Attending: ADVANCED PRACTICE MIDWIFE
Payer: MEDICAID

## 2023-10-05 VITALS
DIASTOLIC BLOOD PRESSURE: 59 MMHG | RESPIRATION RATE: 16 BRPM | HEART RATE: 98 BPM | TEMPERATURE: 98 F | OXYGEN SATURATION: 99 % | SYSTOLIC BLOOD PRESSURE: 107 MMHG

## 2023-10-05 DIAGNOSIS — O99.013 ANEMIA AFFECTING PREGNANCY IN THIRD TRIMESTER: Primary | ICD-10-CM

## 2023-10-05 PROCEDURE — 25000003 PHARM REV CODE 250: Performed by: OBSTETRICS & GYNECOLOGY

## 2023-10-05 PROCEDURE — 63600175 PHARM REV CODE 636 W HCPCS: Performed by: OBSTETRICS & GYNECOLOGY

## 2023-10-05 PROCEDURE — 96374 THER/PROPH/DIAG INJ IV PUSH: CPT

## 2023-10-05 RX ORDER — DIPHENHYDRAMINE HYDROCHLORIDE 50 MG/ML
50 INJECTION INTRAMUSCULAR; INTRAVENOUS ONCE AS NEEDED
OUTPATIENT
Start: 2023-10-12

## 2023-10-05 RX ORDER — SODIUM CHLORIDE 0.9 % (FLUSH) 0.9 %
10 SYRINGE (ML) INJECTION
OUTPATIENT
Start: 2023-10-12

## 2023-10-05 RX ORDER — EPINEPHRINE 0.3 MG/.3ML
0.3 INJECTION SUBCUTANEOUS ONCE AS NEEDED
OUTPATIENT
Start: 2023-10-12

## 2023-10-05 RX ORDER — HEPARIN 100 UNIT/ML
500 SYRINGE INTRAVENOUS
OUTPATIENT
Start: 2023-10-12

## 2023-10-05 RX ORDER — SODIUM CHLORIDE 0.9 % (FLUSH) 0.9 %
10 SYRINGE (ML) INJECTION
Status: DISCONTINUED | OUTPATIENT
Start: 2023-10-05 | End: 2023-10-05 | Stop reason: HOSPADM

## 2023-10-05 RX ORDER — HEPARIN 100 UNIT/ML
500 SYRINGE INTRAVENOUS
Status: DISCONTINUED | OUTPATIENT
Start: 2023-10-05 | End: 2023-10-05 | Stop reason: HOSPADM

## 2023-10-05 RX ADMIN — IRON SUCROSE 100 MG: 20 INJECTION, SOLUTION INTRAVENOUS at 01:10

## 2023-10-05 RX ADMIN — SODIUM CHLORIDE: 9 INJECTION, SOLUTION INTRAVENOUS at 01:10

## 2023-10-05 NOTE — PLAN OF CARE
Patient tolerated her Venofer IVFs. .REports no discomfort. VSS. NAD. Discussed discharge instructions. Verbalized understanding. No other questions. Patient discharged to home per self.

## 2023-10-18 ENCOUNTER — ROUTINE PRENATAL (OUTPATIENT)
Dept: OBSTETRICS AND GYNECOLOGY | Facility: CLINIC | Age: 23
End: 2023-10-18
Payer: MEDICAID

## 2023-10-18 VITALS
DIASTOLIC BLOOD PRESSURE: 73 MMHG | SYSTOLIC BLOOD PRESSURE: 132 MMHG | BODY MASS INDEX: 35.08 KG/M2 | WEIGHT: 204.38 LBS

## 2023-10-18 DIAGNOSIS — Z34.83 ENCOUNTER FOR SUPERVISION OF OTHER NORMAL PREGNANCY IN THIRD TRIMESTER: Primary | ICD-10-CM

## 2023-10-18 PROCEDURE — 99999 PR PBB SHADOW E&M-EST. PATIENT-LVL II: ICD-10-PCS | Mod: PBBFAC,,, | Performed by: ADVANCED PRACTICE MIDWIFE

## 2023-10-18 PROCEDURE — 99213 PR OFFICE/OUTPT VISIT, EST, LEVL III, 20-29 MIN: ICD-10-PCS | Mod: TH,S$PBB,, | Performed by: ADVANCED PRACTICE MIDWIFE

## 2023-10-18 PROCEDURE — 99213 OFFICE O/P EST LOW 20 MIN: CPT | Mod: TH,S$PBB,, | Performed by: ADVANCED PRACTICE MIDWIFE

## 2023-10-18 PROCEDURE — 99212 OFFICE O/P EST SF 10 MIN: CPT | Mod: PBBFAC,TH,PN | Performed by: ADVANCED PRACTICE MIDWIFE

## 2023-10-18 PROCEDURE — 99999 PR PBB SHADOW E&M-EST. PATIENT-LVL II: CPT | Mod: PBBFAC,,, | Performed by: ADVANCED PRACTICE MIDWIFE

## 2023-10-18 NOTE — PROGRESS NOTES
Reason for visit: Routine Prenatal Visit      HPI:   23 y.o., at 38w4d by Estimated Date of Delivery: 10/28/23    In with no c/o    - Contractions: denies  - Bleeding: denies  - Loss of fluid: denies  - Fetal movement: reports good FM, reinforced BId  - Nausea: no  - Vomiting: no  - Headache: no      Reviewed:    Past medical, surgical, social, family, and obstetric history: Reviewed and updated in EMR.  Medications: Reviewed and updated in EMR.  Allergies: Patient has no known allergies.    Pregnancy dating, labs, ultrasound reports, prenatal testing, and problem list: Reviewed and updated in EMR.  Outside records: na  Independent interpretation of tests: na  Discussion with another healthcare professional: na      Vitals: /73   Wt 92.7 kg (204 lb 5.9 oz)   LMP 02/17/2023   BMI 35.08 kg/m²     Physical exam:  GENERAL: No acute distress  ABD: Gravid      Assessment and Plan:    Encounter for supervision of other normal pregnancy in third trimester         Discussed scheduled IOL on 10/30/23 at 0500- instructions at next visit   Reviewed s/s active labor, rom, bleeding and if occur report to L&D      Follow-up: 1 weeks      I spent a total of 20 minutes on the day of the visit. This includes face to face time and non-face to face time preparing to see the patient (eg, review of tests), Obtaining and/or reviewing separately obtained history, Documenting clinical information in the electronic or other health record, Independently interpreting results and communicating results to the patient/family/caregiver, or Care coordination.

## 2023-10-19 NOTE — PROGRESS NOTES
Lab Documentation:    Order Type: Written Order placed in [facility]    Patient in for lab visit only per provider treatment plan.

## 2023-10-28 ENCOUNTER — HOSPITAL ENCOUNTER (INPATIENT)
Facility: OTHER | Age: 23
LOS: 2 days | Discharge: HOME | End: 2023-10-30
Attending: OBSTETRICS & GYNECOLOGY | Admitting: OBSTETRICS & GYNECOLOGY
Payer: MEDICAID

## 2023-10-28 ENCOUNTER — ANESTHESIA EVENT (OUTPATIENT)
Dept: OBSTETRICS AND GYNECOLOGY | Facility: OTHER | Age: 23
End: 2023-10-28
Payer: MEDICAID

## 2023-10-28 ENCOUNTER — ANESTHESIA (OUTPATIENT)
Dept: OBSTETRICS AND GYNECOLOGY | Facility: OTHER | Age: 23
End: 2023-10-28
Payer: MEDICAID

## 2023-10-28 DIAGNOSIS — Z3A.40 40 WEEKS GESTATION OF PREGNANCY: ICD-10-CM

## 2023-10-28 DIAGNOSIS — Z37.9 NORMAL LABOR: ICD-10-CM

## 2023-10-28 PROBLEM — B95.1 GROUP B STREPTOCOCCAL INFECTION DURING PREGNANCY: Status: ACTIVE | Noted: 2023-10-28

## 2023-10-28 PROBLEM — O98.819 GROUP B STREPTOCOCCAL INFECTION DURING PREGNANCY: Status: ACTIVE | Noted: 2023-10-28

## 2023-10-28 LAB
ABO + RH BLD: NORMAL
ANION GAP SERPL CALC-SCNC: 8 MMOL/L (ref 8–16)
BASOPHILS # BLD AUTO: 0.03 K/UL (ref 0–0.2)
BASOPHILS NFR BLD: 0.3 % (ref 0–1.9)
BLD GP AB SCN CELLS X3 SERPL QL: NORMAL
BUN SERPL-MCNC: 6 MG/DL (ref 6–20)
CALCIUM SERPL-MCNC: 9.4 MG/DL (ref 8.7–10.5)
CHLORIDE SERPL-SCNC: 103 MMOL/L (ref 95–110)
CO2 SERPL-SCNC: 24 MMOL/L (ref 23–29)
CREAT SERPL-MCNC: 0.7 MG/DL (ref 0.5–1.4)
DIFFERENTIAL METHOD: ABNORMAL
EOSINOPHIL # BLD AUTO: 0.1 K/UL (ref 0–0.5)
EOSINOPHIL NFR BLD: 0.6 % (ref 0–8)
ERYTHROCYTE [DISTWIDTH] IN BLOOD BY AUTOMATED COUNT: 13.2 % (ref 11.5–14.5)
EST. GFR  (NO RACE VARIABLE): >60 ML/MIN/1.73 M^2
GLUCOSE SERPL-MCNC: 85 MG/DL (ref 70–110)
HCT VFR BLD AUTO: 33.4 % (ref 37–48.5)
HGB BLD-MCNC: 11.6 G/DL (ref 12–16)
IMM GRANULOCYTES # BLD AUTO: 0.09 K/UL (ref 0–0.04)
IMM GRANULOCYTES NFR BLD AUTO: 1 % (ref 0–0.5)
LYMPHOCYTES # BLD AUTO: 1.9 K/UL (ref 1–4.8)
LYMPHOCYTES NFR BLD: 21.6 % (ref 18–48)
MCH RBC QN AUTO: 29.3 PG (ref 27–31)
MCHC RBC AUTO-ENTMCNC: 34.7 G/DL (ref 32–36)
MCV RBC AUTO: 84 FL (ref 82–98)
MONOCYTES # BLD AUTO: 0.9 K/UL (ref 0.3–1)
MONOCYTES NFR BLD: 9.9 % (ref 4–15)
NEUTROPHILS # BLD AUTO: 6 K/UL (ref 1.8–7.7)
NEUTROPHILS NFR BLD: 66.6 % (ref 38–73)
NRBC BLD-RTO: 0 /100 WBC
PLATELET # BLD AUTO: 251 K/UL (ref 150–450)
PMV BLD AUTO: 10.2 FL (ref 9.2–12.9)
POTASSIUM SERPL-SCNC: 3.8 MMOL/L (ref 3.5–5.1)
RBC # BLD AUTO: 3.96 M/UL (ref 4–5.4)
SODIUM SERPL-SCNC: 135 MMOL/L (ref 136–145)
SPECIMEN OUTDATE: NORMAL
WBC # BLD AUTO: 8.95 K/UL (ref 3.9–12.7)

## 2023-10-28 PROCEDURE — 63600175 PHARM REV CODE 636 W HCPCS

## 2023-10-28 PROCEDURE — 59409 PRA ETRICAL CARE,VAG DELIV ONLY: ICD-10-PCS | Mod: AA,,, | Performed by: ANESTHESIOLOGY

## 2023-10-28 PROCEDURE — 72100003 HC LABOR CARE, EA. ADDL. 8 HRS

## 2023-10-28 PROCEDURE — C1751 CATH, INF, PER/CENT/MIDLINE: HCPCS | Performed by: ANESTHESIOLOGY

## 2023-10-28 PROCEDURE — 0502F PR SUBSEQUENT PRENATAL CARE: ICD-10-PCS | Mod: ,,, | Performed by: OBSTETRICS & GYNECOLOGY

## 2023-10-28 PROCEDURE — 59409 OBSTETRICAL CARE: CPT | Mod: AA,,, | Performed by: ANESTHESIOLOGY

## 2023-10-28 PROCEDURE — 51701 INSERT BLADDER CATHETER: CPT

## 2023-10-28 PROCEDURE — 80048 BASIC METABOLIC PNL TOTAL CA: CPT

## 2023-10-28 PROCEDURE — 99283 PR EMERGENCY DEPT VISIT,LEVEL III: ICD-10-PCS | Mod: 25,,, | Performed by: OBSTETRICS & GYNECOLOGY

## 2023-10-28 PROCEDURE — 59409 OBSTETRICAL CARE: CPT | Mod: GB,,, | Performed by: OBSTETRICS & GYNECOLOGY

## 2023-10-28 PROCEDURE — 72100002 HC LABOR CARE, 1ST 8 HOURS

## 2023-10-28 PROCEDURE — 0502F SUBSEQUENT PRENATAL CARE: CPT | Mod: ,,, | Performed by: OBSTETRICS & GYNECOLOGY

## 2023-10-28 PROCEDURE — 25000003 PHARM REV CODE 250: Performed by: STUDENT IN AN ORGANIZED HEALTH CARE EDUCATION/TRAINING PROGRAM

## 2023-10-28 PROCEDURE — 72200005 HC VAGINAL DELIVERY LEVEL II

## 2023-10-28 PROCEDURE — 59025 PR FETAL 2N-STRESS TEST: ICD-10-PCS | Mod: 26,59,, | Performed by: OBSTETRICS & GYNECOLOGY

## 2023-10-28 PROCEDURE — 25000003 PHARM REV CODE 250

## 2023-10-28 PROCEDURE — 11000001 HC ACUTE MED/SURG PRIVATE ROOM

## 2023-10-28 PROCEDURE — 59025 FETAL NON-STRESS TEST: CPT | Mod: 26,59,, | Performed by: OBSTETRICS & GYNECOLOGY

## 2023-10-28 PROCEDURE — 76815 PR  US,PREGNANT UTERUS,LIMITED, 1/> FETUSES: ICD-10-PCS | Mod: 26,,, | Performed by: OBSTETRICS & GYNECOLOGY

## 2023-10-28 PROCEDURE — 76815 OB US LIMITED FETUS(S): CPT | Mod: 26,,, | Performed by: OBSTETRICS & GYNECOLOGY

## 2023-10-28 PROCEDURE — 51702 INSERT TEMP BLADDER CATH: CPT

## 2023-10-28 PROCEDURE — 27200710 HC EPIDURAL INFUSION PUMP SET: Performed by: ANESTHESIOLOGY

## 2023-10-28 PROCEDURE — 99285 EMERGENCY DEPT VISIT HI MDM: CPT

## 2023-10-28 PROCEDURE — 86850 RBC ANTIBODY SCREEN: CPT

## 2023-10-28 PROCEDURE — 36415 COLL VENOUS BLD VENIPUNCTURE: CPT

## 2023-10-28 PROCEDURE — 85025 COMPLETE CBC W/AUTO DIFF WBC: CPT

## 2023-10-28 PROCEDURE — 59025 FETAL NON-STRESS TEST: CPT

## 2023-10-28 PROCEDURE — 99283 EMERGENCY DEPT VISIT LOW MDM: CPT | Mod: 25,,, | Performed by: OBSTETRICS & GYNECOLOGY

## 2023-10-28 PROCEDURE — 62326 NJX INTERLAMINAR LMBR/SAC: CPT | Performed by: STUDENT IN AN ORGANIZED HEALTH CARE EDUCATION/TRAINING PROGRAM

## 2023-10-28 PROCEDURE — 59409 PR OBSTETRICAL CARE,VAG DELIV ONLY: ICD-10-PCS | Mod: GB,,, | Performed by: OBSTETRICS & GYNECOLOGY

## 2023-10-28 RX ORDER — OXYTOCIN/RINGER'S LACTATE 30/500 ML
95 PLASTIC BAG, INJECTION (ML) INTRAVENOUS ONCE AS NEEDED
Status: DISCONTINUED | OUTPATIENT
Start: 2023-10-28 | End: 2023-10-30 | Stop reason: HOSPADM

## 2023-10-28 RX ORDER — MISOPROSTOL 200 UG/1
800 TABLET ORAL ONCE AS NEEDED
Status: DISCONTINUED | OUTPATIENT
Start: 2023-10-28 | End: 2023-10-30 | Stop reason: HOSPADM

## 2023-10-28 RX ORDER — OXYTOCIN/RINGER'S LACTATE 30/500 ML
95 PLASTIC BAG, INJECTION (ML) INTRAVENOUS ONCE
Status: DISCONTINUED | OUTPATIENT
Start: 2023-10-28 | End: 2023-10-28

## 2023-10-28 RX ORDER — LIDOCAINE HYDROCHLORIDE AND EPINEPHRINE 15; 5 MG/ML; UG/ML
INJECTION, SOLUTION EPIDURAL
Status: DISCONTINUED | OUTPATIENT
Start: 2023-10-28 | End: 2023-10-28

## 2023-10-28 RX ORDER — DIPHENOXYLATE HYDROCHLORIDE AND ATROPINE SULFATE 2.5; .025 MG/1; MG/1
2 TABLET ORAL EVERY 6 HOURS PRN
Status: DISCONTINUED | OUTPATIENT
Start: 2023-10-28 | End: 2023-10-28

## 2023-10-28 RX ORDER — OXYTOCIN/RINGER'S LACTATE 30/500 ML
0-32 PLASTIC BAG, INJECTION (ML) INTRAVENOUS CONTINUOUS
Status: DISCONTINUED | OUTPATIENT
Start: 2023-10-28 | End: 2023-10-28

## 2023-10-28 RX ORDER — SODIUM CITRATE AND CITRIC ACID MONOHYDRATE 334; 500 MG/5ML; MG/5ML
30 SOLUTION ORAL ONCE
Status: DISCONTINUED | OUTPATIENT
Start: 2023-10-28 | End: 2023-10-29

## 2023-10-28 RX ORDER — ACETAMINOPHEN 325 MG/1
650 TABLET ORAL EVERY 6 HOURS PRN
Status: DISCONTINUED | OUTPATIENT
Start: 2023-10-28 | End: 2023-10-30 | Stop reason: HOSPADM

## 2023-10-28 RX ORDER — SIMETHICONE 80 MG
1 TABLET,CHEWABLE ORAL EVERY 6 HOURS PRN
Status: DISCONTINUED | OUTPATIENT
Start: 2023-10-28 | End: 2023-10-30 | Stop reason: HOSPADM

## 2023-10-28 RX ORDER — CARBOPROST TROMETHAMINE 250 UG/ML
250 INJECTION, SOLUTION INTRAMUSCULAR
Status: DISCONTINUED | OUTPATIENT
Start: 2023-10-28 | End: 2023-10-28

## 2023-10-28 RX ORDER — SODIUM CHLORIDE 0.9 % (FLUSH) 0.9 %
10 SYRINGE (ML) INJECTION
Status: DISCONTINUED | OUTPATIENT
Start: 2023-10-28 | End: 2023-10-30 | Stop reason: HOSPADM

## 2023-10-28 RX ORDER — OXYTOCIN 10 [USP'U]/ML
10 INJECTION, SOLUTION INTRAMUSCULAR; INTRAVENOUS ONCE AS NEEDED
Status: DISCONTINUED | OUTPATIENT
Start: 2023-10-28 | End: 2023-10-30 | Stop reason: HOSPADM

## 2023-10-28 RX ORDER — OXYTOCIN/RINGER'S LACTATE 30/500 ML
95 PLASTIC BAG, INJECTION (ML) INTRAVENOUS ONCE AS NEEDED
Status: DISCONTINUED | OUTPATIENT
Start: 2023-10-28 | End: 2023-10-28

## 2023-10-28 RX ORDER — OXYTOCIN/RINGER'S LACTATE 30/500 ML
334 PLASTIC BAG, INJECTION (ML) INTRAVENOUS ONCE
Status: DISCONTINUED | OUTPATIENT
Start: 2023-10-28 | End: 2023-10-28

## 2023-10-28 RX ORDER — PROCHLORPERAZINE EDISYLATE 5 MG/ML
5 INJECTION INTRAMUSCULAR; INTRAVENOUS EVERY 6 HOURS PRN
Status: DISCONTINUED | OUTPATIENT
Start: 2023-10-28 | End: 2023-10-28

## 2023-10-28 RX ORDER — TRANEXAMIC ACID 10 MG/ML
1000 INJECTION, SOLUTION INTRAVENOUS EVERY 30 MIN PRN
Status: DISCONTINUED | OUTPATIENT
Start: 2023-10-28 | End: 2023-10-28

## 2023-10-28 RX ORDER — CALCIUM CARBONATE 200(500)MG
500 TABLET,CHEWABLE ORAL 3 TIMES DAILY PRN
Status: DISCONTINUED | OUTPATIENT
Start: 2023-10-28 | End: 2023-10-28

## 2023-10-28 RX ORDER — LIDOCAINE HYDROCHLORIDE 10 MG/ML
10 INJECTION INFILTRATION; PERINEURAL ONCE AS NEEDED
Status: DISCONTINUED | OUTPATIENT
Start: 2023-10-28 | End: 2023-10-28

## 2023-10-28 RX ORDER — OXYTOCIN/RINGER'S LACTATE 30/500 ML
334 PLASTIC BAG, INJECTION (ML) INTRAVENOUS ONCE AS NEEDED
Status: DISCONTINUED | OUTPATIENT
Start: 2023-10-28 | End: 2023-10-28

## 2023-10-28 RX ORDER — FENTANYL/BUPIVACAINE/NS/PF 2MCG/ML-.1
PLASTIC BAG, INJECTION (ML) INJECTION CONTINUOUS
Status: DISCONTINUED | OUTPATIENT
Start: 2023-10-28 | End: 2023-10-29

## 2023-10-28 RX ORDER — METHYLERGONOVINE MALEATE 0.2 MG/ML
200 INJECTION INTRAVENOUS ONCE AS NEEDED
Status: DISCONTINUED | OUTPATIENT
Start: 2023-10-28 | End: 2023-10-28

## 2023-10-28 RX ORDER — MISOPROSTOL 200 UG/1
800 TABLET ORAL ONCE AS NEEDED
Status: DISCONTINUED | OUTPATIENT
Start: 2023-10-28 | End: 2023-10-28

## 2023-10-28 RX ORDER — DIPHENOXYLATE HYDROCHLORIDE AND ATROPINE SULFATE 2.5; .025 MG/1; MG/1
2 TABLET ORAL EVERY 6 HOURS PRN
Status: DISCONTINUED | OUTPATIENT
Start: 2023-10-28 | End: 2023-10-30 | Stop reason: HOSPADM

## 2023-10-28 RX ORDER — PRENATAL WITH FERROUS FUM AND FOLIC ACID 3080; 920; 120; 400; 22; 1.84; 3; 20; 10; 1; 12; 200; 27; 25; 2 [IU]/1; [IU]/1; MG/1; [IU]/1; MG/1; MG/1; MG/1; MG/1; MG/1; MG/1; UG/1; MG/1; MG/1; MG/1; MG/1
1 TABLET ORAL DAILY
Status: DISCONTINUED | OUTPATIENT
Start: 2023-10-28 | End: 2023-10-28

## 2023-10-28 RX ORDER — OXYTOCIN/RINGER'S LACTATE 30/500 ML
334 PLASTIC BAG, INJECTION (ML) INTRAVENOUS ONCE AS NEEDED
Status: DISCONTINUED | OUTPATIENT
Start: 2023-10-28 | End: 2023-10-30 | Stop reason: HOSPADM

## 2023-10-28 RX ORDER — OXYTOCIN/RINGER'S LACTATE 30/500 ML
95 PLASTIC BAG, INJECTION (ML) INTRAVENOUS ONCE
Status: DISCONTINUED | OUTPATIENT
Start: 2023-10-28 | End: 2023-10-29

## 2023-10-28 RX ORDER — ONDANSETRON 8 MG/1
8 TABLET, ORALLY DISINTEGRATING ORAL EVERY 8 HOURS PRN
Status: DISCONTINUED | OUTPATIENT
Start: 2023-10-28 | End: 2023-10-30 | Stop reason: HOSPADM

## 2023-10-28 RX ORDER — FENTANYL/BUPIVACAINE/NS/PF 2MCG/ML-.1
PLASTIC BAG, INJECTION (ML) INJECTION
Status: COMPLETED
Start: 2023-10-28 | End: 2023-10-28

## 2023-10-28 RX ORDER — FENTANYL/BUPIVACAINE/NS/PF 2MCG/ML-.1
PLASTIC BAG, INJECTION (ML) INJECTION
Status: DISCONTINUED | OUTPATIENT
Start: 2023-10-28 | End: 2023-10-28

## 2023-10-28 RX ORDER — DIPHENHYDRAMINE HCL 25 MG
25 CAPSULE ORAL EVERY 4 HOURS PRN
Status: DISCONTINUED | OUTPATIENT
Start: 2023-10-28 | End: 2023-10-30 | Stop reason: HOSPADM

## 2023-10-28 RX ORDER — HYDROCORTISONE 25 MG/G
CREAM TOPICAL 3 TIMES DAILY PRN
Status: DISCONTINUED | OUTPATIENT
Start: 2023-10-28 | End: 2023-10-30 | Stop reason: HOSPADM

## 2023-10-28 RX ORDER — IBUPROFEN 600 MG/1
600 TABLET ORAL EVERY 6 HOURS
Status: DISCONTINUED | OUTPATIENT
Start: 2023-10-28 | End: 2023-10-30 | Stop reason: HOSPADM

## 2023-10-28 RX ORDER — PROCHLORPERAZINE EDISYLATE 5 MG/ML
5 INJECTION INTRAMUSCULAR; INTRAVENOUS EVERY 6 HOURS PRN
Status: DISCONTINUED | OUTPATIENT
Start: 2023-10-28 | End: 2023-10-30 | Stop reason: HOSPADM

## 2023-10-28 RX ORDER — METHYLERGONOVINE MALEATE 0.2 MG/ML
200 INJECTION INTRAVENOUS ONCE AS NEEDED
Status: DISCONTINUED | OUTPATIENT
Start: 2023-10-28 | End: 2023-10-30 | Stop reason: HOSPADM

## 2023-10-28 RX ORDER — CARBOPROST TROMETHAMINE 250 UG/ML
250 INJECTION, SOLUTION INTRAMUSCULAR
Status: DISCONTINUED | OUTPATIENT
Start: 2023-10-28 | End: 2023-10-30 | Stop reason: HOSPADM

## 2023-10-28 RX ORDER — ONDANSETRON 8 MG/1
8 TABLET, ORALLY DISINTEGRATING ORAL EVERY 8 HOURS PRN
Status: DISCONTINUED | OUTPATIENT
Start: 2023-10-28 | End: 2023-10-28

## 2023-10-28 RX ORDER — DOCUSATE SODIUM 100 MG/1
200 CAPSULE, LIQUID FILLED ORAL 2 TIMES DAILY PRN
Status: DISCONTINUED | OUTPATIENT
Start: 2023-10-28 | End: 2023-10-30 | Stop reason: HOSPADM

## 2023-10-28 RX ORDER — SODIUM CHLORIDE, SODIUM LACTATE, POTASSIUM CHLORIDE, CALCIUM CHLORIDE 600; 310; 30; 20 MG/100ML; MG/100ML; MG/100ML; MG/100ML
INJECTION, SOLUTION INTRAVENOUS CONTINUOUS
Status: DISCONTINUED | OUTPATIENT
Start: 2023-10-28 | End: 2023-10-28

## 2023-10-28 RX ORDER — TRANEXAMIC ACID 10 MG/ML
1000 INJECTION, SOLUTION INTRAVENOUS EVERY 30 MIN PRN
Status: DISCONTINUED | OUTPATIENT
Start: 2023-10-28 | End: 2023-10-30 | Stop reason: HOSPADM

## 2023-10-28 RX ORDER — DIPHENHYDRAMINE HYDROCHLORIDE 50 MG/ML
25 INJECTION INTRAMUSCULAR; INTRAVENOUS EVERY 4 HOURS PRN
Status: DISCONTINUED | OUTPATIENT
Start: 2023-10-28 | End: 2023-10-30 | Stop reason: HOSPADM

## 2023-10-28 RX ORDER — FAMOTIDINE 10 MG/ML
20 INJECTION INTRAVENOUS ONCE
Status: DISCONTINUED | OUTPATIENT
Start: 2023-10-28 | End: 2023-10-29

## 2023-10-28 RX ORDER — LANOLIN ALCOHOL/MO/W.PET/CERES
1 CREAM (GRAM) TOPICAL DAILY
Status: DISCONTINUED | OUTPATIENT
Start: 2023-10-28 | End: 2023-10-28

## 2023-10-28 RX ORDER — HYDROCODONE BITARTRATE AND ACETAMINOPHEN 5; 325 MG/1; MG/1
1 TABLET ORAL EVERY 4 HOURS PRN
Status: DISCONTINUED | OUTPATIENT
Start: 2023-10-28 | End: 2023-10-30 | Stop reason: HOSPADM

## 2023-10-28 RX ORDER — HYDROCODONE BITARTRATE AND ACETAMINOPHEN 10; 325 MG/1; MG/1
1 TABLET ORAL EVERY 4 HOURS PRN
Status: DISCONTINUED | OUTPATIENT
Start: 2023-10-28 | End: 2023-10-30 | Stop reason: HOSPADM

## 2023-10-28 RX ORDER — OXYTOCIN 10 [USP'U]/ML
10 INJECTION, SOLUTION INTRAMUSCULAR; INTRAVENOUS ONCE AS NEEDED
Status: DISCONTINUED | OUTPATIENT
Start: 2023-10-28 | End: 2023-10-28

## 2023-10-28 RX ORDER — SIMETHICONE 80 MG
1 TABLET,CHEWABLE ORAL 4 TIMES DAILY PRN
Status: DISCONTINUED | OUTPATIENT
Start: 2023-10-28 | End: 2023-10-28

## 2023-10-28 RX ORDER — PRENATAL WITH FERROUS FUM AND FOLIC ACID 3080; 920; 120; 400; 22; 1.84; 3; 20; 10; 1; 12; 200; 27; 25; 2 [IU]/1; [IU]/1; MG/1; [IU]/1; MG/1; MG/1; MG/1; MG/1; MG/1; MG/1; UG/1; MG/1; MG/1; MG/1; MG/1
1 TABLET ORAL DAILY
Status: DISCONTINUED | OUTPATIENT
Start: 2023-10-28 | End: 2023-10-30 | Stop reason: HOSPADM

## 2023-10-28 RX ADMIN — DOCUSATE SODIUM 200 MG: 100 CAPSULE, LIQUID FILLED ORAL at 08:10

## 2023-10-28 RX ADMIN — DEXTROSE MONOHYDRATE 3 MILLION UNITS: 50 INJECTION, SOLUTION INTRAVENOUS at 07:10

## 2023-10-28 RX ADMIN — SODIUM CHLORIDE, POTASSIUM CHLORIDE, SODIUM LACTATE AND CALCIUM CHLORIDE: 600; 310; 30; 20 INJECTION, SOLUTION INTRAVENOUS at 06:10

## 2023-10-28 RX ADMIN — SODIUM CHLORIDE, POTASSIUM CHLORIDE, SODIUM LACTATE AND CALCIUM CHLORIDE: 600; 310; 30; 20 INJECTION, SOLUTION INTRAVENOUS at 03:10

## 2023-10-28 RX ADMIN — DEXTROSE MONOHYDRATE 3 MILLION UNITS: 50 INJECTION, SOLUTION INTRAVENOUS at 11:10

## 2023-10-28 RX ADMIN — FERROUS SULFATE TAB 325 MG (65 MG ELEMENTAL FE) 1 EACH: 325 (65 FE) TAB at 09:10

## 2023-10-28 RX ADMIN — Medication 4 MILLI-UNITS/MIN: at 06:10

## 2023-10-28 RX ADMIN — DEXTROSE MONOHYDRATE 5 MILLION UNITS: 5 INJECTION INTRAVENOUS at 03:10

## 2023-10-28 RX ADMIN — Medication 2.5 ML: at 06:10

## 2023-10-28 RX ADMIN — LIDOCAINE HYDROCHLORIDE,EPINEPHRINE BITARTRATE 3 ML: 15; .005 INJECTION, SOLUTION EPIDURAL; INFILTRATION; INTRACAUDAL; PERINEURAL at 06:10

## 2023-10-28 RX ADMIN — Medication 10 ML/HR: at 06:10

## 2023-10-28 RX ADMIN — IBUPROFEN 600 MG: 600 TABLET, FILM COATED ORAL at 06:10

## 2023-10-28 RX ADMIN — PRENATAL VIT W/ FE FUMARATE-FA TAB 27-0.8 MG 1 TABLET: 27-0.8 TAB at 09:10

## 2023-10-28 NOTE — ANESTHESIA PROCEDURE NOTES
CSE    Patient location during procedure: OB  Start time: 10/28/2023 5:54 AM  Timeout: 10/28/2023 5:53 AM  End time: 10/28/2023 6:03 AM      Staffing  Authorizing Provider: Valentina Ahumada MD  Performing Provider: Wilber Perez MD    Staffing  Performed by: Wilber Perez MD  Authorized by: Valentina Ahumada MD    Preanesthetic Checklist  Completed: patient identified, IV checked, site marked, risks and benefits discussed, surgical consent, monitors and equipment checked, pre-op evaluation and timeout performed  CSE  Patient position: sitting  Prep: ChloraPrep  Patient monitoring: heart rate, frequent blood pressure checks and continuous pulse ox  Approach: midline  Spinal Needle  Needle type: Paty   Needle gauge: 25 G  Needle length: 3.5 in  Epidural Needle  Injection technique: KIMBERLY air  Needle type: Tuohy   Needle gauge: 17 G  Needle length: 3.5 in  Needle insertion depth: 5.5 cm  Location: L3-4  Needle localization: anatomical landmarks   Catheter  Catheter type: [employer]  Catheter size: 19 G  Catheter at skin depth: 10 cm  Test dose: lidocaine 1.5% with Epi 1-to-200,000  Test dose: 3 mL  Additional Documentation: incremental injection, negative aspiration for CSF, negative aspiration for heme and no paresthesia on injection  Assessment  Intrathecal Medications:   administered: primary anesthetic mcg of

## 2023-10-28 NOTE — H&P
HISTORY AND PHYSICAL                                                OBSTETRICS          Subjective:       Antonio Holt is a 23 y.o.  female with IUP at 40w0d weeks gestation who presents with normal labor.    Patient reports contractions, denies vaginal bleeding, denies LOF.   Fetal Movement: normal.    This IUP is complicated by GBS +    Review of Systems   Constitutional:  Negative for activity change, chills and fever.   Eyes:  Negative for visual disturbance.   Cardiovascular:  Negative for chest pain.   Gastrointestinal:  Negative for nausea and vomiting.   Genitourinary:  Negative for dysuria and vaginal bleeding.   Neurological:  Negative for headaches.       PMHx: No past medical history on file.    PSHx: No past surgical history on file.    All: Review of patient's allergies indicates:  No Known Allergies    Meds: (Not in a hospital admission)      SH:   Social History     Socioeconomic History    Marital status: Single   Tobacco Use    Smoking status: Never    Smokeless tobacco: Never   Substance and Sexual Activity    Alcohol use: Not Currently    Drug use: Never    Sexual activity: Not Currently     Partners: Male     Social Determinants of Health     Financial Resource Strain: High Risk (10/28/2023)    Overall Financial Resource Strain (CARDIA)     Difficulty of Paying Living Expenses: Hard   Food Insecurity: Food Insecurity Present (10/28/2023)    Hunger Vital Sign     Worried About Running Out of Food in the Last Year: Often true     Ran Out of Food in the Last Year: Never true   Transportation Needs: No Transportation Needs (10/28/2023)    PRAPARE - Transportation     Lack of Transportation (Medical): No     Lack of Transportation (Non-Medical): No   Physical Activity: Inactive (2023)    Exercise Vital Sign     Days of Exercise per Week: 0 days     Minutes of Exercise per Session: 0 min   Stress: No Stress Concern Present (10/28/2023)    [employer] -  Occupational Stress Questionnaire     Feeling of Stress : Only a little   Social Connections: Socially Isolated (2023)    Social Connection and Isolation Panel [NHANES]     Frequency of Communication with Friends and Family: More than three times a week     Frequency of Social Gatherings with Friends and Family: More than three times a week     Attends Baptism Services: Never     Active Member of Clubs or Organizations: No     Attends Club or Organization Meetings: Never     Marital Status: Never    Housing Stability: High Risk (10/28/2023)    Housing Stability Vital Sign     Unable to Pay for Housing in the Last Year: Yes     Number of Places Lived in the Last Year: 2     Unstable Housing in the Last Year: No       FH:   Family History   Problem Relation Age of Onset    Breast cancer Neg Hx     Colon cancer Neg Hx     Ovarian cancer Neg Hx        OBHx:   OB History    Para Term  AB Living   2 0 0 0 1 0   SAB IAB Ectopic Multiple Live Births   1 0 0 0 0      # Outcome Date GA Lbr Pollo/2nd Weight Sex Delivery Anes PTL Lv   2 Current            1 2017     SAB         Birth Comments: 20w       Objective:       /67   Pulse 86   Temp 98.3 °F (36.8 °C) (Oral)   Resp 18   LMP 2023   SpO2 98%   Breastfeeding No     Vitals:    10/28/23 0227 10/28/23 0228 10/28/23 0241 10/28/23 0243   BP: 120/70   124/67   Pulse: 88 83 87 86   Resp:       Temp:       TempSrc:       SpO2: 97%  98%        General: NAD, alert, oriented, cooperative  HEENT: NCAT, EOM grossly intact  Lungs: Normal WOB  Heart: regular rate  Abdomen: gravid, soft, nondistended, nontender, no rebound or guarding  Extremities: trace edema    FHT: 130 bpm, moderate BTBV, + accels, - decels; Cat 1 (reassuring)  [place]: q 2-3 mins  Presentation: cephalic by ultrasound    Cervix: 3/80/-3    Recent Growth Scan:    34w 4d   2451 g    28%      Lab Review  Blood Type O POS  GBBS: positive  Rubella: Immune  RPR: neg  HIV:  negative  HepB: negative     Assessment:       40w0d weeks gestation with normal labor.    Active Hospital Problems    Diagnosis  POA    *Normal labor [O80, Z37.9]  Not Applicable    Group B streptococcal infection during pregnancy [O98.819, B95.1]  Unknown      Resolved Hospital Problems   No resolved problems to display.          Plan:     Normal labor   Risks, benefits, alternatives and possible complications have been discussed in detail with the patient.   - Consents signed and to chart  - Admit to Labor and Delivery unit  - Epidural per Anesthesia  - Draw CBC, T&S  - Notify Staff  - Recheck in 2 hrs or PRN  - Post-Partum Hemorrhage risk - low  - Postpartum lovenox: not indicated  - Contraception: OCPs  - Plan for pitocin if unchanged at next check    2. GBS +  - PCN prophylaxis      Mercy Galvin MD  Obstetrics and Gynecology, PGY-1

## 2023-10-28 NOTE — PROGRESS NOTES
"LABOR NOTE    S:  Complaints: No.  Epidural working:  not applicable      O: BP (!) 102/59   Pulse 87   Temp 98.1 °F (36.7 °C) (Oral)   Resp 18   Ht 5' 4" (1.626 m)   Wt 92.7 kg (204 lb 5.9 oz)   LMP 02/17/2023   SpO2 100%   Breastfeeding No   BMI 35.08 kg/m²     FHT: baseline 125, moderate BTBV, + accels, no decels Cat 1 (reassuring)  CTX: q irregular  SVE: 4/80/-2    Timeline  0230: 3/80/-3  0515: 4/80/-2    PLAN:  Continue Close Maternal/Fetal Monitoring  Patient will discuss with partner regarding starting pitocin  Recheck 2-4 hours or PRN    Ludmila Jeffries MD  PGY-3 OB/GYN     "

## 2023-10-28 NOTE — L&D DELIVERY NOTE
"[facility] - [facility]  Vaginal Delivery   Operative Note    SUMMARY     Normal spontaneous vaginal delivery of liveborn male infant, was placed on mothers abdomen for skin to skin and bulb suctioning performed.  Infant delivered position OA over intact perineum.  Nuchal cord: No.    Spontaneous delivery of placenta and IV pitocin given noting good uterine tone.  Bilateral periurethral lacerations were noted and repaired with running 3-0 vicryl sutures on the left and two simple interrupted 3-0 vicryl sutures on the right.  Patient tolerated delivery and repair well. Sponge needle and lap counted correct x2.    Indications:  (spontaneous vaginal delivery)  Pregnancy complicated by:   Patient Active Problem List   Diagnosis    Encounter for supervision of normal pregnancy in second trimester    Anemia affecting pregnancy in third trimester     (spontaneous vaginal delivery)    Group B streptococcal infection during pregnancy     Admitting GA: 40w0d    Delivery Information for Ezra Holt    Birth information:  YOB: 2024   Time of birth: 2:08 PM   Sex: male   Head Delivery Date/Time: 10/28/2023  2:08 PM   Delivery type: Vaginal, Spontaneous   Gestational Age: 40w0d        Delivery Providers    Delivering clinician: Alissa Gaytan MD   Provider Role    Augie Hills MD Resident    Ladonna Lund RN Registered Nurse    Suha Esparza RN Charge Nurse    Noemí Davis RN Registered Nurse    Kassie Reyes ST Surgical Tech              Measurements    Weight: 3200 g  Weight (lbs): 7 lb 0.9 oz  Length: 50.2 cm  Length (in): 19.75"  Head circumference: 35 cm  Chest circumference: 34 cm         Apgars    Living status: Living  Apgar Component Scores:  1 min.:  5 min.:  10 min.:  15 min.:  20 min.:    Skin color:  1  1       Heart rate:  2  2       Reflex irritability:  2  2       Muscle tone:  2  2       Respiratory effort:  2  2       Total:  9  9       Apgars assigned by: M " MOSHE COREA         Operative Delivery    Forceps attempted?: No  Vacuum extractor attempted?: No         Shoulder Dystocia    Shoulder dystocia present?: No           Presentation    Presentation: Vertex  Position: Right Occiput Anterior           Interventions/Resuscitation    Method: Tactile Stimulation, Bulb Suctioning       Cord    Vessels: 3 vessels  Complications: None  Delayed Cord Clamping?: Yes  Cord Clamped Date/Time: 10/28/2023  2:09 PM  Cord Blood Disposition: Sent with Baby  Gases Sent?: No  Stem Cell Collection (by MD): No       Placenta    Placenta delivery date/time: 10/28/2023 1411  Placenta removal: Spontaneous  Placenta appearance: Intact  Placenta disposition: Discarded           Labor Events:       labor: No     Labor Onset Date/Time: 10/28/2023 09:37     Dilation Complete Date/Time: 10/28/2023 13:33     Start Pushing Date/Time: 10/28/2023 13:52       Start Pushing Date/Time: 10/28/2023 13:52     Rupture Date/Time: 10/28/23  0937         Rupture type: ARM (Artificial Rupture)         Fluid Amount:       Fluid Color: Clear               steroids: None     Antibiotics given for GBS: Yes     Induction: none     Indications for induction:  Elective     Augmentation: oxytocin;amniotomy     Indications for augmentation:       Labor complications: None     Additional complications:          Cervical ripening:                     Delivery:      Episiotomy: None     Indication for Episiotomy:       Perineal Lacerations: None Repaired:      Periurethral Laceration: bilateral Repaired: Yes   Labial Laceration:   Repaired:     Sulcus Laceration:   Repaired:     Vaginal Laceration:   Repaired:     Cervical Laceration:   Repaired:     Repair suture:       Repair # of packets: 1     Last Value - EBL - Nursing (mL):       Sum - EBL - Nursing (mL): 0     Last Value - EBL - Anesthesia (mL):      Calculated QBL (mL): 200      Vaginal Sweep Performed: Yes     Surgicount Correct: Yes     Vaginal  Packing: No Quantity:       Other providers:       Anesthesia    Method: Epidural          Details (if applicable):  Trial of Labor      Categorization:      Priority:     Indications for :     Incision Type:       Additional  information:  Forceps:    Vacuum:    Breech:    Observed anomalies    Other (Comments):       Augie Hills MD MS  OB/Gyn  PGY-1    Attending Attestation  I agree with the above edited resident note and was present and gloved for the entire delivery.  Alissa Gaytan MD  OB Hospitalist  10/28/2023

## 2023-10-28 NOTE — ED PROVIDER NOTES
Encounter Date: 10/28/2023       History     Chief Complaint   Patient presents with    Contractions     Antonio Holt is a 23 y.o. [id number] at 40w0d presents complaining of contractions every 10 minutes for the past few hours.   This IUP is complicated by GBS +.  Patient reports contractions, denies vaginal bleeding, denies LOF.   Fetal Movement: normal          Review of patient's allergies indicates:  No Known Allergies  No past medical history on file.  No past surgical history on file.  Family History   Problem Relation Age of Onset    Breast cancer Neg Hx     Colon cancer Neg Hx     Ovarian cancer Neg Hx      Social History     Tobacco Use    Smoking status: Never    Smokeless tobacco: Never   Substance Use Topics    Alcohol use: Not Currently    Drug use: Never     Review of Systems   Constitutional:  Negative for chills and fever.   HENT:  Negative for congestion.    Eyes:  Negative for visual disturbance.   Respiratory:  Negative for shortness of breath.    Cardiovascular:  Negative for chest pain and palpitations.   Gastrointestinal:  Negative for constipation, diarrhea, nausea and vomiting.   Genitourinary:  Negative for dysuria, vaginal bleeding and vaginal discharge.   Musculoskeletal:  Negative for back pain.   Skin:  Negative for rash.   Neurological:  Negative for light-headedness and headaches.       Physical Exam     Initial Vitals   BP Pulse Resp Temp SpO2   10/28/23 0159 10/28/23 0157 10/28/23 0157 10/28/23 0157 10/28/23 0157   113/66 91 18 98.3 °F (36.8 °C) 99 %      MAP       --                Physical Exam    Vitals reviewed.  Constitutional: She appears well-developed and well-nourished. She is not diaphoretic.   HENT:   Head: Normocephalic and atraumatic.   Eyes: Conjunctivae are normal.   Cardiovascular:  Normal rate and intact distal pulses.           Pulmonary/Chest: Breath sounds normal. No respiratory distress. She exhibits no tenderness.   Abdominal: There is no abdominal tenderness.  There is no rebound and no guarding.   Musculoskeletal:         General: No edema. Normal range of motion.     Neurological: She is alert and oriented to person, place, and time.   Skin: Skin is warm and dry.   Psychiatric: She has a normal mood and affect.     OB LABOR EXAM:   Pre-Term Labor: No.   Membranes ruptured: No.   Method: Sterile vaginal exam per MD.   Vaginal Bleeding: none present.     Dilatation: 3.   Station: -3.   Effacement: 70%.   Amniotic Fluid Color: no fluid.           ED Course   Obtain Fetal nonstress test (NST)    Date/Time: 10/28/2023 2:32 AM    Performed by: Mercy Rodriguez MD  Authorized by: Mercy Rodriguez MD    Nonstress Test:     Variability:  6-25 BPM    Decelerations:  None    Accelerations:  15 bpm    Baseline:  130    Contractions:  Regular    Contraction Frequency:  2-3  Biophysical Profile:     Nonstress Test Interpretation: reactive      Overall Impression:  Reassuring  Post-procedure:     Patient tolerance:  Patient tolerated the procedure well with no immediate complications    Labs Reviewed   CBC W/ AUTO DIFFERENTIAL   TYPE & SCREEN          Imaging Results    None            Medications   ferrous sulfate tablet 1 each (has no administration in time range)   prenatal vitamin oral tablet (has no administration in time range)   lactated ringers bolus 1,000 mL (has no administration in time range)   lactated ringers bolus 500 mL (has no administration in time range)   lactated ringers infusion (has no administration in time range)   oxytocin 30 units in 500 mL lactated ringers infusion (non-titrating) (has no administration in time range)   oxytocin 30 units in 500 mL lactated ringers infusion (non-titrating) (has no administration in time range)   ceFAZolin 2 g in dextrose 5 % in water (D5W) 50 mL IVPB (MB+) (has no administration in time range)   azithromycin (ZITHROMAX) 500 mg in dextrose 5 % (D5W) 250 mL IVPB (Vial-Mate) (has no administration in time range)   ondansetron  disintegrating tablet 8 mg (has no administration in time range)   prochlorperazine injection Soln 5 mg (has no administration in time range)   calcium carbonate 200 mg calcium (500 mg) chewable tablet 500 mg (has no administration in time range)   simethicone chewable tablet 80 mg (has no administration in time range)   LIDOcaine HCL 10 mg/ml (1%) injection 10 mL (has no administration in time range)   oxytocin 30 units in 500 mL lactated ringers infusion (non-titrating) (has no administration in time range)   oxytocin 30 units in 500 mL lactated ringers infusion (non-titrating) (has no administration in time range)   oxytocin injection 10 Units (has no administration in time range)   miSOPROStoL tablet 800 mcg (has no administration in time range)   miSOPROStoL tablet 800 mcg (has no administration in time range)   methylergonovine injection 200 mcg (has no administration in time range)   carboprost injection 250 mcg (has no administration in time range)   tranexamic acid in NaCl,iso-os IVPB 1,000 mg (has no administration in time range)   diphenoxylate-atropine 2.5-0.025 mg per tablet 2 tablet (has no administration in time range)   penicillin G potassium 3 Million Units in dextrose 5 % 100 mL IVPB (has no administration in time range)   penicillin G potassium 5 Million Units in dextrose 5 % in water (D5W) 100 mL IVPB (MB+) (has no administration in time range)     Medical Decision Making  - VSS  - NST RR  - [place] q 2-3  - SVE 3/70/-3  - Last check 1 cm  - Admitted to Labor and delivery for normal labor  - US Vtx  - See H&P for further details               Attending Attestation:   Physician Attestation Statement for Resident:  As the supervising MD   Physician Attestation Statement: I have personally seen and examined this patient.   I agree with the above history.  -:   As the supervising MD I agree with the above PE.     As the supervising MD I agree with the above treatment, course, plan, and disposition.   I  was personally present during the critical portions of the procedure(s) performed by the resident and was immediately available in the ED to provide services and assistance as needed during the entire procedure.              Attending ED Notes:   I have personally seen and examined the patient. I agree with the resident's note . Questions welcomed and answered to patient satisfaction.      @ 40w4d presenting for labor  NST: reactive and reassuring   SVE 3/70/-3, painful regular contractions.     Admit for labor, pt agrees to AROM    Marley Arevalo MD  10/29/2023 10:09 AM                Mercy Galvin MD  Obstetrics and Gynecology, PGY-1              Clinical Impression:   Final diagnoses:  [O80, Z37.9] Normal labor (Primary)  [Z3A.40] 40 weeks gestation of pregnancy        ED Disposition Condition    Send to L&D                 Marley Lucero MD  10/29/23 1011

## 2023-10-28 NOTE — PLAN OF CARE
Problem:  Fall Injury Risk  Goal: Absence of Fall, Infant Drop and Related Injury  10/28/2023 1448 by Ladonna Lund RN  Outcome: Ongoing, Progressing  10/28/2023 0825 by Ladonna Lund RN  Outcome: Ongoing, Progressing     Problem: Adult Inpatient Plan of Care  Goal: Plan of Care Review  10/28/2023 1448 by Ladonna Lund RN  Outcome: Ongoing, Progressing  10/28/2023 0825 by Ladonna Lund RN  Outcome: Ongoing, Progressing  Goal: Patient-Specific Goal (Individualized)  10/28/2023 1448 by Ladonna Lund RN  Outcome: Ongoing, Progressing  10/28/2023 08 by Ladonna Lund RN  Outcome: Ongoing, Progressing  Goal: Absence of Hospital-Acquired Illness or Injury  10/28/2023 1448 by Ladonna Lund RN  Outcome: Ongoing, Progressing  10/28/2023 0825 by Ladonna Lund RN  Outcome: Ongoing, Progressing  Goal: Optimal Comfort and Wellbeing  10/28/2023 144 by Ladonna Lund RN  Outcome: Ongoing, Progressing  10/28/2023 08 by Ladonna Lund RN  Outcome: Ongoing, Progressing  Goal: Readiness for Transition of Care  10/28/2023 144 by Ladonna Lund RN  Outcome: Ongoing, Progressing  10/28/2023 0825 by Ladonna Lund RN  Outcome: Ongoing, Progressing     Problem: Infection  Goal: Absence of Infection Signs and Symptoms  10/28/2023 1448 by Ladonna Lund RN  Outcome: Ongoing, Progressing  10/28/2023 0825 by Ladonna Lund RN  Outcome: Ongoing, Progressing     Problem: Bleeding (Labor)  Goal: Hemostasis  10/28/2023 1448 by Ladonna Lund RN  Outcome: Met  10/28/2023 08 by Ladonna Lund RN  Outcome: Ongoing, Progressing     Problem: Change in Fetal Wellbeing (Labor)  Goal: Stable Fetal Wellbeing  10/28/2023 144 by Ladonna Lund RN  Outcome: Met  10/28/2023 08 by Ladonna Lund RN  Outcome: Ongoing, Progressing     Problem: Delayed Labor Progression (Labor)  Goal: Effective Progression to Delivery  10/28/2023 1448 by Ladonna Lund, RN  Outcome:  Met  10/28/2023 0825 by Ladonna Lund RN  Outcome: Ongoing, Progressing     Problem: Infection (Labor)  Goal: Absence of Infection Signs and Symptoms  10/28/2023 1448 by Ladonna Lund RN  Outcome: Met  10/28/2023 0825 by Ladonna Lund RN  Outcome: Ongoing, Progressing     Problem: Labor Pain (Labor)  Goal: Acceptable Pain Control  10/28/2023 1448 by Ladonna Lund RN  Outcome: Met  10/28/2023 0825 by Ladonna Lund RN  Outcome: Ongoing, Progressing     Problem: Uterine Tachysystole (Labor)  Goal: Normal Uterine Contraction Pattern  10/28/2023 1448 by Ladonna Lund RN  Outcome: Met  10/28/2023 0825 by Ladonna Lund RN  Outcome: Ongoing, Progressing     Problem: Skin Injury Risk Increased  Goal: Skin Health and Integrity  10/28/2023 1448 by Ladonna Lund RN  Outcome: Ongoing, Progressing  10/28/2023 0825 by Ladonna Lund RN  Outcome: Ongoing, Progressing     Problem: Device-Related Complication Risk (Anesthesia/Analgesia, Neuraxial)  Goal: Safe Infusion Delivery Completion  10/28/2023 1448 by Ladonna Lund RN  Outcome: Met  10/28/2023 0825 by Ladonna Lund RN  Outcome: Ongoing, Progressing     Problem: Infection (Anesthesia/Analgesia, Neuraxial)  Goal: Absence of Infection Signs and Symptoms  10/28/2023 1448 by Ladonna Lund RN  Outcome: Met  10/28/2023 0825 by Ladonna Lund RN  Outcome: Ongoing, Progressing     Problem: Nausea and Vomiting (Anesthesia/Analgesia, Neuraxial)  Goal: Nausea and Vomiting Relief  10/28/2023 1448 by Ladonna Lund RN  Outcome: Met  10/28/2023 0825 by Ladonna Lund RN  Outcome: Ongoing, Progressing     Problem: Pain (Anesthesia/Analgesia, Neuraxial)  Goal: Effective Pain Control  10/28/2023 1448 by Ladonna Lund RN  Outcome: Met  10/28/2023 0825 by Ladonna Lund RN  Outcome: Ongoing, Progressing     Problem: Respiratory Compromise (Anesthesia/Analgesia, Neuraxial)  Goal: Effective Oxygenation and Ventilation  10/28/2023 1448 by  Ladonna Lund RN  Outcome: Met  10/28/2023 0825 by Ladonna Lund RN  Outcome: Ongoing, Progressing     Problem: Sensorimotor Impairment (Anesthesia/Analgesia, Neuraxial)  Goal: Baseline Motor Function  10/28/2023 1448 by Ladonna Lund RN  Outcome: Met  10/28/2023 0825 by Ladonna Lund RN  Outcome: Ongoing, Progressing     Problem: Urinary Retention (Anesthesia/Analgesia, Neuraxial)  Goal: Effective Urinary Elimination  10/28/2023 1448 by Ladonna Lund RN  Outcome: Met  10/28/2023 0825 by Ladonna Lund RN  Outcome: Ongoing, Progressing     Problem: Adjustment to Role Transition (Postpartum Vaginal Delivery)  Goal: Successful Maternal Role Transition  Outcome: Ongoing, Progressing     Problem: Bleeding (Postpartum Vaginal Delivery)  Goal: Hemostasis  Outcome: Ongoing, Progressing     Problem: Infection (Postpartum Vaginal Delivery)  Goal: Absence of Infection Signs/Symptoms  Outcome: Ongoing, Progressing     Problem: Pain (Postpartum Vaginal Delivery)  Goal: Acceptable Pain Control  Outcome: Ongoing, Progressing     Problem: Urinary Retention (Postpartum Vaginal Delivery)  Goal: Effective Urinary Elimination  Outcome: Ongoing, Progressing

## 2023-10-28 NOTE — ANESTHESIA PREPROCEDURE EVALUATION
[facility]  Anesthesia Pre-Operative Evaluation         Patient Name: Antonio Holt  YOB: 2000  MRN: [id number]    10/28/2023      Antonio Holt is a 23 y.o. female  @ 40w0d who presents with contractions. IUP c/b GBS+. Denies cHTN, asthma, DM, bleeding diathesis, anticoag, spinal d/o or prior spinal surgery.    OB History    Para Term  AB Living   2       1     SAB IAB Ectopic Multiple Live Births   1              # Outcome Date GA Lbr Pollo/2nd Weight Sex Delivery Anes PTL Lv   2 Current            1 SAB 2017     SAB         Birth Comments: 20w       Review of patient's allergies indicates:  No Known Allergies    Wt Readings from Last 1 Encounters:   10/28/23 0315 92.7 kg (204 lb 5.9 oz)       BP Readings from Last 3 Encounters:   10/28/23 (!) 109/58   10/18/23 132/73   10/05/23 (!) 107/59       Patient Active Problem List   Diagnosis    Encounter for supervision of normal pregnancy in second trimester    Anemia affecting pregnancy in third trimester    Normal labor    Group B streptococcal infection during pregnancy       No past surgical history on file.    Social History     Socioeconomic History    Marital status: Single   Tobacco Use    Smoking status: Never    Smokeless tobacco: Never   Substance and Sexual Activity    Alcohol use: Not Currently    Drug use: Never    Sexual activity: Not Currently     Partners: Male     Social Determinants of Health     Financial Resource Strain: High Risk (10/28/2023)    Overall Financial Resource Strain (CARDIA)     Difficulty of Paying Living Expenses: Hard   Food Insecurity: Food Insecurity Present (10/28/2023)    Hunger Vital Sign     Worried About Running Out of Food in the Last Year: Often true     Ran Out of Food in the Last Year: Never true   Transportation Needs: No Transportation Needs (10/28/2023)    PRAPARE - Transportation     Lack of Transportation (Medical): No     Lack of Transportation  "(Non-Medical): No   Physical Activity: Inactive (6/30/2023)    Exercise Vital Sign     Days of Exercise per Week: 0 days     Minutes of Exercise per Session: 0 min   Stress: No Stress Concern Present (10/28/2023)    [employer] - Occupational Stress Questionnaire     Feeling of Stress : Only a little   Social Connections: Socially Isolated (6/30/2023)    Social Connection and Isolation Panel [NHANES]     Frequency of Communication with Friends and Family: More than three times a week     Frequency of Social Gatherings with Friends and Family: More than three times a week     Attends Christianity Services: Never     Active Member of Clubs or Organizations: No     Attends Club or Organization Meetings: Never     Marital Status: Never    Housing Stability: High Risk (10/28/2023)    Housing Stability Vital Sign     Unable to Pay for Housing in the Last Year: Yes     Number of Places Lived in the Last Year: 2     Unstable Housing in the Last Year: No         Chemistry        Component Value Date/Time     (L) 04/15/2023 1956    K 3.6 04/15/2023 1956     04/15/2023 1956    CO2 19 (L) 04/15/2023 1956    BUN 11 04/15/2023 1956    CREATININE 0.6 04/15/2023 1956    GLU 71 04/15/2023 1956        Component Value Date/Time    CALCIUM 9.1 04/15/2023 1956    ALKPHOS 59 04/15/2023 1956    AST 25 04/15/2023 1956    ALT 25 04/15/2023 1956    BILITOT 0.2 04/15/2023 1956            Lab Results   Component Value Date    WBC 8.95 10/28/2023    HGB 11.6 (L) 10/28/2023    HCT 33.4 (L) 10/28/2023    MCV 84 10/28/2023     10/28/2023       No results for input(s): "PT", "INR", "PROTIME", "APTT" in the last 72 hours.      Pre-op Assessment    I have reviewed the Patient Summary Reports.     I have reviewed the Nursing Notes.    I have reviewed the Medications.     Review of Systems  Anesthesia Hx:  No problems with previous Anesthesia  Denies Family Hx of Anesthesia complications.  "  Denies Personal Hx of Anesthesia complications.   Social:  Non-Smoker    Hematology/Oncology:  Hematology Normal   Oncology Normal     EENT/Dental:EENT/Dental Normal   Cardiovascular:  Cardiovascular Normal     Pulmonary:  Pulmonary Normal    Renal/:  Renal/ Normal     Hepatic/GI:  Hepatic/GI Normal    Musculoskeletal:  Musculoskeletal Normal    Neurological:  Neurology Normal    Endocrine:  Endocrine Normal    Psych:  Psychiatric Normal           Physical Exam  General: Well nourished    Airway:  Mouth Opening: Normal  TM Distance: Normal  Neck ROM: Normal ROM        Anesthesia Plan  Type of Anesthesia, risks & benefits discussed:    Anesthesia Type: Gen ETT, MAC, Epidural, Spinal, CSE  Intra-op Monitoring Plan: Standard ASA Monitors  Post Op Pain Control Plan: multimodal analgesia, IV/PO Opioids PRN, epidural analgesia and intrathecal opioid  Airway Plan: Video, Post-Induction  Informed Consent: Informed consent signed with the Patient and all parties understand the risks and agree with anesthesia plan.  All questions answered.   ASA Score: 2  Day of Surgery Review of History & Physical: H&P Update referred to the surgeon/provider.    Ready For Surgery From Anesthesia Perspective.     .

## 2023-10-28 NOTE — PROGRESS NOTES
"LABOR NOTE    S:  Complaints: No.  Epidural working:  yes      O: /60   Pulse 104   Temp 97.8 °F (36.6 °C) (Oral)   Resp 16   Ht 5' 4" (1.626 m)   Wt 92.7 kg (204 lb 5.9 oz)   LMP 02/17/2023   SpO2 100%   Breastfeeding No   BMI 35.08 kg/m²     FHT: baseline 130, moderate BTBV, + accels, no decels Cat 1 (reassuring)  CTX: 2-3 min pit @ 16mU/min  SVE: 7/80/-2     Timeline  0230: 3/80/-3  0515: 4/80/-2  0930: 5/80/-2, AROM clr, pit @ 10mU/min  1200: 7/80/-2 pit @ 16     PLAN:  IOL   Continue Close Maternal/Fetal Monitoring  Pitocin Augmentation Per Protocol  Recheck 2-4 hours or PRN    2. Decreased UOP   - UOP 10cc/1H  - S/p 1L bolus without improvement   - Lopez balloon pushed past infant head on SVE  - BMP pending     Laney Llanos MD  PGY 3  Obstetrics and Gynecology    "

## 2023-10-28 NOTE — ANESTHESIA PROCEDURE NOTES
Epidural    Patient location during procedure: OB   Reason for block: primary anesthetic   Reason for block: labor analgesia requested by patient and obstetrician  Diagnosis: IUP   Start time: 10/28/2023 5:54 AM  Timeout: 10/28/2023 5:53 AM  End time: 10/28/2023 6:04 AM  Surgery related to: Vaginal Delivery    Staffing  Performing Provider: Wilber Perez MD  Authorizing Provider: Valentina Ahumada MD    Staffing  Performed by: Wilber Perez MD  Authorized by: Valentina Ahumada MD        Preanesthetic Checklist  Completed: patient identified, IV checked, site marked, risks and benefits discussed, surgical consent, monitors and equipment checked, pre-op evaluation, timeout performed, anesthesia consent given, hand hygiene performed and patient being monitored  Preparation  Patient position: sitting  Prep: ChloraPrep  Patient monitoring: Pulse Ox and Blood Pressure  Reason for block: primary anesthetic   Epidural  Skin Anesthetic: lidocaine 1%  Skin Wheal: 3 mL  Administration type: continuous  Approach: midline  Interspace: L3-4    Injection technique: KIMBERLY air  Needle and Epidural Catheter  Needle type: Tuohy   Needle gauge: 17  Needle length: 3.5 inches  Needle insertion depth: 5.5 cm  Catheter type: springwSTEARCLEAR  Catheter size: 19 G  Catheter at skin depth: 10 cm  Insertion Attempts: 1  Test dose: 3 mL of lidocaine 1.5% with Epi 1-to-200,000  Additional Documentation: incremental injection, negative aspiration for heme and CSF, no paresthesia on injection, no signs/symptoms of IV or SA injection, no significant pain on injection and no significant complaints from patient  Needle localization: anatomical landmarks  Medications:  Volume per aspiration: 5 mL  Time between aspirations: 5 minutes   Assessment  Ease of block: easy  Patient's tolerance of the procedure: comfortable throughout block and no complaints No inadvertent dural puncture with Tuohy.  Dural puncture performed with spinal needle.

## 2023-10-28 NOTE — PROGRESS NOTES
"LABOR NOTE    S:  Complaints: No.  Epidural working:  yes      O: BP (!) 113/58   Pulse 78   Temp 97.8 °F (36.6 °C) (Oral)   Resp 15   Ht 5' 4" (1.626 m)   Wt 92.7 kg (204 lb 5.9 oz)   LMP 02/17/2023   SpO2 100%   Breastfeeding No   BMI 35.08 kg/m²     FHT: baseline 130, moderate BTBV, + accels, no decels Cat 1 (reassuring)  CTX: q irregular, pit @ 10mU/min  SVE: 5/80/-2, AROM clr    Timeline  0230: 3/80/-3  0515: 4/80/-2  0930: 5/80/-2, AROM clr, pit @ 10mU/min    PLAN:  Continue Close Maternal/Fetal Monitoring  Pitocin Augmentation Per Protocol  Recheck 2-4 hours or PRN      David Perez MD PGY1  Obstetrics and Gynecology       "

## 2023-10-28 NOTE — PLAN OF CARE
POC reviewed with pt throughout shift. Pt DTV at 2315. Pain managed with po pain meds. VSS. Uterus midline and firm without massage at umbilicus. Light lochia rubra without foul odor. Pt's bed locked in lowest position and call bell within reach. Pt encouraged to call with any needs.

## 2023-10-28 NOTE — PLAN OF CARE
Problem:  Fall Injury Risk  Goal: Absence of Fall, Infant Drop and Related Injury  Outcome: Ongoing, Progressing     Problem: Adult Inpatient Plan of Care  Goal: Plan of Care Review  Outcome: Ongoing, Progressing  Goal: Patient-Specific Goal (Individualized)  Outcome: Ongoing, Progressing  Goal: Absence of Hospital-Acquired Illness or Injury  Outcome: Ongoing, Progressing  Goal: Optimal Comfort and Wellbeing  Outcome: Ongoing, Progressing  Goal: Readiness for Transition of Care  Outcome: Ongoing, Progressing     Problem: Infection  Goal: Absence of Infection Signs and Symptoms  Outcome: Ongoing, Progressing     Problem: Bleeding (Labor)  Goal: Hemostasis  Outcome: Ongoing, Progressing     Problem: Change in Fetal Wellbeing (Labor)  Goal: Stable Fetal Wellbeing  Outcome: Ongoing, Progressing     Problem: Infection (Labor)  Goal: Absence of Infection Signs and Symptoms  Outcome: Ongoing, Progressing     Problem: Labor Pain (Labor)  Goal: Acceptable Pain Control  Outcome: Ongoing, Progressing     Problem: Uterine Tachysystole (Labor)  Goal: Normal Uterine Contraction Pattern  Outcome: Ongoing, Progressing     Problem: Skin Injury Risk Increased  Goal: Skin Health and Integrity  Outcome: Ongoing, Progressing     Problem: Device-Related Complication Risk (Anesthesia/Analgesia, Neuraxial)  Goal: Safe Infusion Delivery Completion  Outcome: Ongoing, Progressing     Problem: Infection (Anesthesia/Analgesia, Neuraxial)  Goal: Absence of Infection Signs and Symptoms  Outcome: Ongoing, Progressing     Problem: Nausea and Vomiting (Anesthesia/Analgesia, Neuraxial)  Goal: Nausea and Vomiting Relief  Outcome: Ongoing, Progressing     Problem: Pain (Anesthesia/Analgesia, Neuraxial)  Goal: Effective Pain Control  Outcome: Ongoing, Progressing     Problem: Respiratory Compromise (Anesthesia/Analgesia, Neuraxial)  Goal: Effective Oxygenation and Ventilation  Outcome: Ongoing, Progressing     Problem: Sensorimotor Impairment  (Anesthesia/Analgesia, Neuraxial)  Goal: Baseline Motor Function  Outcome: Ongoing, Progressing     Problem: Urinary Retention (Anesthesia/Analgesia, Neuraxial)  Goal: Effective Urinary Elimination  Outcome: Ongoing, Progressing

## 2023-10-29 LAB
BASOPHILS # BLD AUTO: 0.03 K/UL (ref 0–0.2)
BASOPHILS NFR BLD: 0.2 % (ref 0–1.9)
DIFFERENTIAL METHOD: ABNORMAL
EOSINOPHIL # BLD AUTO: 0 K/UL (ref 0–0.5)
EOSINOPHIL NFR BLD: 0.3 % (ref 0–8)
ERYTHROCYTE [DISTWIDTH] IN BLOOD BY AUTOMATED COUNT: 13.2 % (ref 11.5–14.5)
HCT VFR BLD AUTO: 29.1 % (ref 37–48.5)
HGB BLD-MCNC: 10.1 G/DL (ref 12–16)
IMM GRANULOCYTES # BLD AUTO: 0.07 K/UL (ref 0–0.04)
IMM GRANULOCYTES NFR BLD AUTO: 0.6 % (ref 0–0.5)
LYMPHOCYTES # BLD AUTO: 1.8 K/UL (ref 1–4.8)
LYMPHOCYTES NFR BLD: 14.3 % (ref 18–48)
MCH RBC QN AUTO: 29.4 PG (ref 27–31)
MCHC RBC AUTO-ENTMCNC: 34.7 G/DL (ref 32–36)
MCV RBC AUTO: 85 FL (ref 82–98)
MONOCYTES # BLD AUTO: 1.2 K/UL (ref 0.3–1)
MONOCYTES NFR BLD: 9.5 % (ref 4–15)
NEUTROPHILS # BLD AUTO: 9.4 K/UL (ref 1.8–7.7)
NEUTROPHILS NFR BLD: 75.1 % (ref 38–73)
NRBC BLD-RTO: 0 /100 WBC
PLATELET # BLD AUTO: 212 K/UL (ref 150–450)
PMV BLD AUTO: 10.5 FL (ref 9.2–12.9)
RBC # BLD AUTO: 3.43 M/UL (ref 4–5.4)
WBC # BLD AUTO: 12.5 K/UL (ref 3.9–12.7)

## 2023-10-29 PROCEDURE — 99232 PR SUBSEQUENT HOSPITAL CARE,LEVL II: ICD-10-PCS | Mod: ,,,

## 2023-10-29 PROCEDURE — 85025 COMPLETE CBC W/AUTO DIFF WBC: CPT

## 2023-10-29 PROCEDURE — 25000003 PHARM REV CODE 250

## 2023-10-29 PROCEDURE — 11000001 HC ACUTE MED/SURG PRIVATE ROOM

## 2023-10-29 PROCEDURE — 99232 SBSQ HOSP IP/OBS MODERATE 35: CPT | Mod: ,,,

## 2023-10-29 PROCEDURE — 36415 COLL VENOUS BLD VENIPUNCTURE: CPT

## 2023-10-29 RX ADMIN — IBUPROFEN 600 MG: 600 TABLET, FILM COATED ORAL at 01:10

## 2023-10-29 RX ADMIN — DOCUSATE SODIUM 200 MG: 100 CAPSULE, LIQUID FILLED ORAL at 08:10

## 2023-10-29 RX ADMIN — PRENATAL VIT W/ FE FUMARATE-FA TAB 27-0.8 MG 1 TABLET: 27-0.8 TAB at 08:10

## 2023-10-29 RX ADMIN — IBUPROFEN 600 MG: 600 TABLET, FILM COATED ORAL at 08:10

## 2023-10-29 RX ADMIN — IBUPROFEN 600 MG: 600 TABLET, FILM COATED ORAL at 02:10

## 2023-10-29 NOTE — SUBJECTIVE & OBJECTIVE
Interval History: PPD1    She is doing well this morning. She is tolerating a regular diet without nausea or vomiting. She is voiding spontaneously. She is ambulating. She has not passed flatus, and has not a BM. Vaginal bleeding is mild. She denies fever or chills. Abdominal pain is mild and controlled with oral medications. She Is breastfeeding. She desires circumcision for her male baby: yes.    Objective:     Vital Signs (Most Recent):  Temp: 98 °F (36.7 °C) (10/29/23 0850)  Pulse: 73 (10/29/23 0850)  Resp: 18 (10/29/23 0850)  BP: (!) 107/58 (10/29/23 0850)  SpO2: 98 % (10/29/23 0850) Vital Signs (24h Range):  Temp:  [97.8 °F (36.6 °C)-98.4 °F (36.9 °C)] 98 °F (36.7 °C)  Pulse:  [] 73  Resp:  [16-18] 18  SpO2:  [97 %-100 %] 98 %  BP: (103-139)/(52-76) 107/58     Weight: 92.7 kg (204 lb 5.9 oz)  Body mass index is 35.08 kg/m².      Intake/Output Summary (Last 24 hours) at 10/29/2023 0921  Last data filed at 10/28/2023 2020  Gross per 24 hour   Intake 159.85 ml   Output 1565 ml   Net -1405.15 ml         Significant Labs:  Lab Results   Component Value Date    GROUPTRH O POS 10/28/2023    HEPBSAG Non-reactive 04/21/2023    STREPBCULT (A) 10/02/2023     STREPTOCOCCUS AGALACTIAE (GROUP B)  In case of Penicillin allergy, call lab for further testing.  Beta-hemolytic streptococci are routinely susceptible to   penicillins,cephalosporins and carbapenems.  Susceptibility testing not routinely performed       Recent Labs   Lab 10/29/23  0414   HGB 10.1*   HCT 29.1*       I have personallly reviewed all pertinent lab results from the last 24 hours.    Physical Exam:   Constitutional: She is oriented to person, place, and time. She appears well-developed and well-nourished.    HENT:   Head: Normocephalic and atraumatic.    Eyes: Conjunctivae are normal.     Cardiovascular:  Normal rate.             Pulmonary/Chest: Effort normal.        Abdominal: Soft. There is no abdominal tenderness.     Genitourinary: There is  vaginal discharge (lochia) in the vagina.           Musculoskeletal: Normal range of motion.       Neurological: She is alert and oriented to person, place, and time.    Skin: Skin is warm and dry.    Psychiatric: She has a normal mood and affect. Her behavior is normal. Judgment and thought content normal.       Review of Systems   Constitutional:  Negative for chills and fever.   Eyes: Negative.    Respiratory: Negative.     Cardiovascular: Negative.    Gastrointestinal:  Positive for abdominal pain (cramping). Negative for nausea and vomiting.   Endocrine: Negative.    Genitourinary:  Positive for vaginal bleeding (lochia). Negative for vaginal odor.   Musculoskeletal: Negative.    Integumentary:  Negative.   Neurological: Negative.    Hematological: Negative.    Psychiatric/Behavioral: Negative.     Breast: negative.

## 2023-10-29 NOTE — ASSESSMENT & PLAN NOTE
10/29/23 PPD1 - doing well today, normal lochia, pain controlled with PO meds. Breastfeeding going OK -- will see lactation. Has supplemented with some formula. Will f/u with OB for contraception. Plan to discharge home tomorrow.

## 2023-10-29 NOTE — PLAN OF CARE
VSS. Pain controlled with scheduled oral pain medication. Formula feeding without RN assistance using Similac and yellow slow flow nipple. Fundus firm and midline with moderate lochia rubra upon assessment. Voiding spontaneously without difficulty and with adequate output. Unable to pass gas at this time. Repeat CBC to be drawn @0430.      Problem:  Fall Injury Risk  Goal: Absence of Fall, Infant Drop and Related Injury  Outcome: Ongoing, Progressing  Intervention: Identify and Manage Contributors  Flowsheets (Taken 10/29/2023 0352)  Self-Care Promotion: independence encouraged  Medication Review/Management: medications reviewed  Intervention: Prevent [place] Drop or Fall  Flowsheets (Taken 10/29/2023 0352)  Infant Drop Prevention: safety rounds completed  Intervention: Promote Injury-Free Environment  Flowsheets (Taken 10/29/2023 0352)  Safety Promotion/Fall Prevention:   assistive device/personal item within reach   family to remain at bedside   medications reviewed   nonskid shoes/socks when out of bed   side rails raised x 2     Problem: Adult Inpatient Plan of Care  Goal: Plan of Care Review  Outcome: Ongoing, Progressing  Flowsheets (Taken 10/29/2023 0352)  Plan of Care Reviewed With: patient  Goal: Patient-Specific Goal (Individualized)  Outcome: Ongoing, Progressing  Flowsheets (Taken 10/29/2023 0352)  Anxieties, Fears or Concerns: healthy baby  Individualized Care Needs: support  Goal: Absence of Hospital-Acquired Illness or Injury  Outcome: Ongoing, Progressing  Intervention: Identify and Manage Fall Risk  Flowsheets (Taken 10/29/2023 0352)  Safety Promotion/Fall Prevention:   assistive device/personal item within reach   family to remain at bedside   medications reviewed   nonskid shoes/socks when out of bed   side rails raised x 2  Intervention: Prevent Skin Injury  Flowsheets (Taken 10/29/2023 0352)  Body Position: position changed independently  Skin Protection: adhesive use  limited  Intervention: Prevent and Manage VTE (Venous Thromboembolism) Risk  Flowsheets (Taken 10/29/2023 0352)  Activity Management: Ankle pumps - L1  VTE Prevention/Management:   ambulation promoted   fluids promoted  Intervention: Prevent Infection  Flowsheets (Taken 10/29/2023 0352)  Infection Prevention: hand hygiene promoted  Goal: Optimal Comfort and Wellbeing  Outcome: Ongoing, Progressing  Intervention: Monitor Pain and Promote Comfort  Flowsheets (Taken 10/29/2023 0352)  Pain Management Interventions: medication offered  Intervention: Provide Person-Centered Care  Flowsheets (Taken 10/29/2023 0352)  Trust Relationship/Rapport:   care explained   choices provided   thoughts/feelings acknowledged   reassurance provided   questions encouraged   questions answered  Goal: Readiness for Transition of Care  Outcome: Ongoing, Progressing     Problem: Infection  Goal: Absence of Infection Signs and Symptoms  Outcome: Ongoing, Progressing  Intervention: Prevent or Manage Infection  Flowsheets (Taken 10/29/2023 0352)  Infection Management: aseptic technique maintained     Problem: Skin Injury Risk Increased  Goal: Skin Health and Integrity  Outcome: Ongoing, Progressing  Intervention: Optimize Skin Protection  Flowsheets (Taken 10/29/2023 0352)  Skin Protection: adhesive use limited  Head of Bed (HOB) Positioning: HOB elevated     Problem: Adjustment to Role Transition (Postpartum Vaginal Delivery)  Goal: Successful Maternal Role Transition  Outcome: Ongoing, Progressing  Intervention: Support Maternal Role Transition  Flowsheets (Taken 10/29/2023 0352)  Supportive Measures:   active listening utilized   decision-making supported   goal-setting facilitated   verbalization of feelings encouraged   self-responsibility promoted   self-reflection promoted   problem-solving facilitated   positive reinforcement provided   self-care encouraged   relaxation techniques promoted  Parent/Child Attachment Promotion:   caring  behavior modeled   cue recognition promoted   participation in care promoted   skin-to-skin contact encouraged   parent/caregiver presence encouraged   positive reinforcement provided   rooming-in promoted   interaction encouraged   strengths emphasized     Problem: Infection (Postpartum Vaginal Delivery)  Goal: Absence of Infection Signs/Symptoms  Outcome: Ongoing, Progressing  Intervention: Prevent or Manage Infection  Flowsheets (Taken 10/29/2023 0352)  Infection Management: aseptic technique maintained     Problem: Pain (Postpartum Vaginal Delivery)  Goal: Acceptable Pain Control  Outcome: Ongoing, Progressing  Intervention: Prevent or Manage Pain  Flowsheets (Taken 10/29/2023 0352)  Pain Management Interventions: medication offered     Problem: Urinary Retention (Postpartum Vaginal Delivery)  Goal: Effective Urinary Elimination  Outcome: Ongoing, Progressing  Intervention: Promote Effective Urinary Elimination  Flowsheets (Taken 10/29/2023 0352)  Urinary Elimination Promotion: frequent voiding encouraged     Problem: Breastfeeding  Goal: Effective Breastfeeding  Outcome: Ongoing, Progressing  Intervention: Promote Effective Breastfeeding  Flowsheets (Taken 10/29/2023 0352)  Parent/Child Attachment Promotion:   caring behavior modeled   cue recognition promoted   participation in care promoted   skin-to-skin contact encouraged   parent/caregiver presence encouraged   positive reinforcement provided   rooming-in promoted   interaction encouraged   strengths emphasized  Intervention: Support Exclusive Breastfeeding Success  Flowsheets (Taken 10/29/2023 0352)  Supportive Measures:   active listening utilized   decision-making supported   goal-setting facilitated   verbalization of feelings encouraged   self-responsibility promoted   self-reflection promoted   problem-solving facilitated   positive reinforcement provided   self-care encouraged   relaxation techniques promoted

## 2023-10-29 NOTE — LACTATION NOTE
10/29/23 1340   Maternal Assessment   Breast Shape Bilateral:;round   Breast Density Bilateral:;soft   Areola Bilateral:;elastic   Nipples Bilateral:;everted   Maternal Infant Feeding   Maternal Emotional State assist needed;relaxed   Equipment Type   Breast Pump Type double electric, hospital grade   Breast Pump Flange Type hard   Breast Pump Flange Size 24 mm   Breast Pumping   Breast Pumping Interventions frequent pumping encouraged   Breast Pumping double electric breast pump utilized     Visited patient in room, c/o difficulty latching baby onto the breasts and sustaining a latch.   Feeding options discussed.   Patient would like to exclusively pump and bottle feed EBM.  Assisted patient to begin use of Symphony pump c the initiation pumping pattern c the most suction that was comfortable; obtained a total of 0.5 ml stored in a syringe at the baby's bedside for the next feeding.  Instructed and demonstrated to father care of the collection kit, washed and air drying.  Encouraged to increase calories, fluids, and rest.  Encouraged to call for assistance prn.

## 2023-10-29 NOTE — PLAN OF CARE
POC reviewed with pt throughout shift. Pt voiding, passing flatulus, and ambulating without difficulty. Pain managed with po pain meds. VSS. Uterus midline and firm without massage at umbilicus. Light lochia rubra without foul odor. Pt's bed locked in lowest position and call bell within reach. Pt encouraged to call with any needs.

## 2023-10-29 NOTE — HOSPITAL COURSE
10/29/23 PPD1 - doing well today, normal lochia, pain controlled with PO meds. Breastfeeding going OK -- will see lactation. Has supplemented with some formula. Plan to discharge home tomorrow.

## 2023-10-29 NOTE — ANESTHESIA POSTPROCEDURE EVALUATION
Anesthesia Post Evaluation    Patient: Antonio Holt    Procedure(s) Performed: * No procedures listed *    Final Anesthesia Type: CSE      Patient location during evaluation: labor & delivery  Patient participation: Yes- Able to Participate  Level of consciousness: awake and alert  Post-procedure vital signs: reviewed and stable  Pain management: adequate  Airway patency: patent  LUPILLO mitigation strategies: Use of major conduction anesthesia (spinal/epidural) or peripheral nerve block and Multimodal analgesia  PONV status at discharge: No PONV  Anesthetic complications: no      Cardiovascular status: hemodynamically stable  Respiratory status: unassisted, spontaneous ventilation and room air  Hydration status: euvolemic            Vitals Value Taken Time   /58 10/29/23 0850   Temp 36.7 °C (98 °F) 10/29/23 0850   Pulse 73 10/29/23 0850   Resp 18 10/29/23 0850   SpO2 98 % 10/29/23 0850         No case tracking events are documented in the log.      Pain/Snehal Score: Pain Rating Prior to Med Admin: 7 (10/29/2023  8:23 AM)  Pain Rating Post Med Admin: 5 (10/29/2023  9:20 AM)

## 2023-10-29 NOTE — PLAN OF CARE
"Plan of care:  Patient will Paced bottle feed baby EBM/formula ad herve c feeding cues "8 or more in 24";  will double pump breasts using the Initiation pumping pattern c the most suction that is comfortable at each feeding;  will store and use EBM as discussed; will care for collection kit as instructed; will monitor baby's 24hr diaper counts; will call for assistance prn.   "

## 2023-10-29 NOTE — PROGRESS NOTES
[facility]  Obstetrics  Postpartum Progress Note    Patient Name: Antonio Holt  MRN: [id number]  Admission Date: 10/28/2023  Hospital Length of Stay: 1 days  Attending Physician: THADDEUS Gimenez MD  Primary Care Provider: [facility]    Subjective:     Principal Problem: (spontaneous vaginal delivery)    Hospital Course:  10/29/23 PPD1 - doing well today, normal lochia, pain controlled with PO meds. Breastfeeding going OK -- will see lactation. Has supplemented with some formula. Plan to discharge home tomorrow.      Interval History: PPD1    She is doing well this morning. She is tolerating a regular diet without nausea or vomiting. She is voiding spontaneously. She is ambulating. She has not passed flatus, and has not a BM. Vaginal bleeding is mild. She denies fever or chills. Abdominal pain is mild and controlled with oral medications. She Is breastfeeding. She desires circumcision for her male baby: yes.    Objective:     Vital Signs (Most Recent):  Temp: 98 °F (36.7 °C) (10/29/23 0850)  Pulse: 73 (10/29/23 0850)  Resp: 18 (10/29/23 0850)  BP: (!) 107/58 (10/29/23 0850)  SpO2: 98 % (10/29/23 0850) Vital Signs (24h Range):  Temp:  [97.8 °F (36.6 °C)-98.4 °F (36.9 °C)] 98 °F (36.7 °C)  Pulse:  [] 73  Resp:  [16-18] 18  SpO2:  [97 %-100 %] 98 %  BP: (103-139)/(52-76) 107/58     Weight: 92.7 kg (204 lb 5.9 oz)  Body mass index is 35.08 kg/m².      Intake/Output Summary (Last 24 hours) at 10/29/2023 0921  Last data filed at 10/28/2023 2020  Gross per 24 hour   Intake 159.85 ml   Output 1565 ml   Net -1405.15 ml         Significant Labs:  Lab Results   Component Value Date    GROUPTRH O POS 10/28/2023    HEPBSAG Non-reactive 2023    STREPBCULT (A) 10/02/2023     STREPTOCOCCUS AGALACTIAE (GROUP B)  In case of Penicillin allergy, call lab for further testing.  Beta-hemolytic streptococci are routinely susceptible to   penicillins,cephalosporins and  carbapenems.  Susceptibility testing not routinely performed       Recent Labs   Lab 10/29/23  0414   HGB 10.1*   HCT 29.1*       I have personallly reviewed all pertinent lab results from the last 24 hours.    Physical Exam:   Constitutional: She is oriented to person, place, and time. She appears well-developed and well-nourished.    HENT:   Head: Normocephalic and atraumatic.    Eyes: Conjunctivae are normal.     Cardiovascular:  Normal rate.             Pulmonary/Chest: Effort normal.        Abdominal: Soft. There is no abdominal tenderness.     Genitourinary: There is vaginal discharge (lochia) in the vagina.           Musculoskeletal: Normal range of motion.       Neurological: She is alert and oriented to person, place, and time.    Skin: Skin is warm and dry.    Psychiatric: She has a normal mood and affect. Her behavior is normal. Judgment and thought content normal.       Review of Systems   Constitutional:  Negative for chills and fever.   Eyes: Negative.    Respiratory: Negative.     Cardiovascular: Negative.    Gastrointestinal:  Positive for abdominal pain (cramping). Negative for nausea and vomiting.   Endocrine: Negative.    Genitourinary:  Positive for vaginal bleeding (lochia). Negative for vaginal odor.   Musculoskeletal: Negative.    Integumentary:  Negative.   Neurological: Negative.    Hematological: Negative.    Psychiatric/Behavioral: Negative.     Breast: negative.        Assessment/Plan:     23 y.o. female  for:    *  (spontaneous vaginal delivery)  10/29/23 PPD1 - doing well today, normal lochia, pain controlled with PO meds. Breastfeeding going OK -- will see lactation. Has supplemented with some formula. Will f/u with OB for contraception. Plan to discharge home tomorrow.        Disposition: As patient meets milestones, will plan to discharge tomorrow.    Rossy Hodges CNM  Obstetrics  [facility] ([place]

## 2023-10-30 VITALS
RESPIRATION RATE: 18 BRPM | HEIGHT: 64 IN | TEMPERATURE: 99 F | DIASTOLIC BLOOD PRESSURE: 59 MMHG | OXYGEN SATURATION: 83 % | WEIGHT: 204.38 LBS | SYSTOLIC BLOOD PRESSURE: 105 MMHG | BODY MASS INDEX: 34.89 KG/M2 | HEART RATE: 79 BPM

## 2023-10-30 PROBLEM — B95.1 GROUP B STREPTOCOCCAL INFECTION DURING PREGNANCY: Status: RESOLVED | Noted: 2023-10-28 | Resolved: 2023-10-30

## 2023-10-30 PROBLEM — O98.819 GROUP B STREPTOCOCCAL INFECTION DURING PREGNANCY: Status: RESOLVED | Noted: 2023-10-28 | Resolved: 2023-10-30

## 2023-10-30 PROCEDURE — 25000003 PHARM REV CODE 250

## 2023-10-30 PROCEDURE — 99238 HOSP IP/OBS DSCHRG MGMT 30/<: CPT | Mod: ,,,

## 2023-10-30 PROCEDURE — 99238 PR HOSPITAL DISCHARGE DAY,<30 MIN: ICD-10-PCS | Mod: ,,,

## 2023-10-30 RX ORDER — DOCUSATE SODIUM 100 MG/1
200 CAPSULE, LIQUID FILLED ORAL 2 TIMES DAILY PRN
Qty: 30 CAPSULE | Refills: 0 | Status: ON HOLD | OUTPATIENT
Start: 2023-10-30 | End: 2023-12-07

## 2023-10-30 RX ORDER — LANOLIN ALCOHOL/MO/W.PET/CERES
1 CREAM (GRAM) TOPICAL DAILY
Status: DISCONTINUED | OUTPATIENT
Start: 2023-10-30 | End: 2023-10-30 | Stop reason: HOSPADM

## 2023-10-30 RX ADMIN — IBUPROFEN 600 MG: 600 TABLET, FILM COATED ORAL at 02:10

## 2023-10-30 RX ADMIN — IBUPROFEN 600 MG: 600 TABLET, FILM COATED ORAL at 09:10

## 2023-10-30 RX ADMIN — DOCUSATE SODIUM 200 MG: 100 CAPSULE, LIQUID FILLED ORAL at 09:10

## 2023-10-30 RX ADMIN — PRENATAL VIT W/ FE FUMARATE-FA TAB 27-0.8 MG 1 TABLET: 27-0.8 TAB at 09:10

## 2023-10-30 RX ADMIN — FERROUS SULFATE TAB 325 MG (65 MG ELEMENTAL FE) 1 EACH: 325 (65 FE) TAB at 09:10

## 2023-10-30 NOTE — LACTATION NOTE
10/30/23 0930   Breast Pumping   Breast Pumping Interventions early pumping promoted;frequent pumping encouraged   Breast Pumping other (see comments)  (encouraged to pump 8x/day)   Community Referrals   Community Referrals support group;pediatric care provider;outpatient lactation program     Discharge lactation education reviewed for pumping and bottle feeding EBM. Mom has a Medela pump from a family/friend; Pt given Rx and [facility] information to obtain pump today for home use. Reviewed pumping 8x/day to stimulate and maintain fully milk production. To clean parts after each use and sterilize daily; milk storage/handling guidelines reviewed. PT has lactation warmline for support after discharge.

## 2023-10-30 NOTE — DISCHARGE SUMMARY
[facility] ([place])  Obstetrics  Discharge Summary      Patient Name: Antonio Holt  MRN: [id number]  Admission Date: 10/28/2023  Hospital Length of Stay: 2 days  Discharge Date and Time:  10/30/2023 9:15 AM  Attending Physician: THADDEUS Gimenez MD   Discharging Provider: Anne Almodovar CNM   Primary Care Provider: [facility]    HPI: No notes on file        * No surgery found *     Hospital Course:   10/29/23 PPD1 - doing well today, normal lochia, pain controlled with PO meds. Breastfeeding going OK -- will see lactation. Has supplemented with some formula. Plan to discharge home tomorrow.    Interval History: PPD2     She is doing well this morning. She is tolerating a regular diet without nausea or vomiting. She is voiding spontaneously. She is ambulating. She has  passed flatus, and has had a BM. Vaginal bleeding is mild. She denies fever or chills. Abdominal pain is mild and controlled with oral medications. She Is formula feeding. She desires circumcision for her male baby: yes.      Physical Exam:   Constitutional: She is oriented to person, place, and time. She appears well-developed and well-nourished.    HENT:   Head: Normocephalic and atraumatic.    Eyes: Conjunctivae are normal.     Cardiovascular:  Normal rate.             Pulmonary/Chest: Effort normal.       Abdominal: Soft. There is no abdominal tenderness.     Genitourinary: There is vaginal discharge (lochia) in the vagina.           Musculoskeletal: Normal range of motion.       Neurological: She is alert and oriented to person, place, and time.    Skin: Skin is warm and dry.    Psychiatric: She has a normal mood and affect. Her behavior is normal. Judgment and thought content normal.         Review of Systems   Constitutional:  Negative for chills and fever.   Eyes: Negative.    Respiratory: Negative.     Cardiovascular: Negative.    Gastrointestinal:  Positive for abdominal pain (cramping). Negative for nausea  "and vomiting.    Genitourinary:  Positive for vaginal bleeding (lochia). Negative for vaginal odor.   Musculoskeletal: Negative.    Neurological: Negative.     Psychiatric/Behavioral: Negative.     Breast: Negative    Final Active Diagnoses:    Diagnosis Date Noted POA    PRINCIPAL PROBLEM:   (spontaneous vaginal delivery) [O80] 10/28/2023 Not Applicable    Anemia affecting pregnancy in third trimester [O99.013] 2023 Yes      Problems Resolved During this Admission:    Diagnosis Date Noted Date Resolved POA    Group B streptococcal infection during pregnancy [O98.819, B95.1] 10/28/2023 10/30/2023 Yes        Significant Diagnostic Studies: Labs: All labs within the past 24 hours have been reviewed      Feeding Method: bottle    Immunizations     Date Immunization Status Dose Route/Site Given by    10/28/23 162 MMR Incomplete 0.5 mL Subcutaneous/     10/28/23 1629 Tdap Incomplete 0.5 mL Intramuscular/           Delivery:    Episiotomy: None   Lacerations: None   Repair suture:     Repair # of packets: 1   Blood loss (ml):       Birth information:  YOB: 2024   Time of birth: 2:08 PM   Sex: male   Delivery type: Vaginal, Spontaneous   Gestational Age: 40w0d     Measurements    Weight: 3200 g  Weight (lbs): 7 lb 0.9 oz  Length: 50.2 cm  Length (in): 19.75"  Head circumference: 35 cm  Chest circumference: 34 cm         Delivery Clinician: Delivery Providers    Delivering clinician: Alissa Gaytan MD   Provider Role    Augie Hills MD Resident    Ladonna Lund RN Registered Nurse    Suha Esparza RN Charge Nurse    Noemí Davis RN Registered Nurse    Kassie Reyes, ST Surgical Tech           Additional  information:  Forceps:    Vacuum:    Breech:    Observed anomalies      Living?:     Apgars    Living status: Living  Apgar Component Scores:  1 min.:  5 min.:  10 min.:  15 min.:  20 min.:    Skin color:  1  1       Heart rate:  2  2       Reflex irritability:  2  2     "   Muscle tone:  2  2       Respiratory effort:  2  2       Total:  9  9       Apgars assigned by: ELEANOR COREA RN         Placenta: Delivered:       appearance    Pending Diagnostic Studies:     None          Discharged Condition: good    Disposition: Home or Self Care    Follow Up:   Follow-up Information     THADDEUS Gimenez MD Follow up in 2 week(s).    Specialties: Obstetrics and Gynecology, Obstetrics, Gynecology  Why: PP mood visit  Contact information:  [address] [id number]  [phone]             THADDEUS Gimenez MD Follow up in 6 week(s).    Specialties: Obstetrics and Gynecology, Obstetrics, Gynecology  Why: Routine PP visit  Contact information:  [address] [facility] [address]  [facility] [address]  [phone]                       Patient Instructions:      BREAST PUMP FOR HOME USE     Order Specific Question Answer Comments   Type of pump: Electric    Weight: 92.7 kg (204 lb 5.9 oz)    Length of need (1-99 months): 99      Diet Adult Regular     Notify your health care provider if you experience any of the following:  temperature >100.4     Notify your health care provider if you experience any of the following:  persistent nausea and vomiting or diarrhea     Notify your health care provider if you experience any of the following:  severe uncontrolled pain     Notify your health care provider if you experience any of the following:  redness, tenderness, or signs of infection (pain, swelling, redness, odor or green/yellow discharge around incision site)     Notify your health care provider if you experience any of the following:  difficulty breathing or increased cough     Notify your health care provider if you experience any of the following:  severe persistent headache     Notify your health care provider if you experience any of the following:  worsening rash     Notify your health care provider if you experience any of the following:  persistent dizziness, light-headedness,  or visual disturbances     Notify your health care provider if you experience any of the following:  increased confusion or weakness     Activity as tolerated     Medications:  Current Discharge Medication List      START taking these medications    Details   docusate sodium (COLACE) 100 MG capsule Take 2 capsules (200 mg total) by mouth 2 (two) times daily as needed for Constipation.  Qty: 30 capsule, Refills: 0         CONTINUE these medications which have NOT CHANGED    Details   ferrous sulfate 325 (65 FE) MG EC tablet Take 1 tablet (325 mg total) by mouth 3 (three) times daily with meals.  Qty: 30 tablet, Refills: 10    Associated Diagnoses: Anemia affecting pregnancy in second trimester      PNV,calcium 72/iron/folic acid (PRENATAL VITAMIN) Tab Take 1 tablet by mouth once daily.  Qty: 30 tablet, Refills: 11    Associated Diagnoses: Encounter for supervision of other normal pregnancy in second trimester             Anne Almodovar CNM  Obstetrics  [facility]

## 2023-10-30 NOTE — PLAN OF CARE
Pt ambulating and voiding without difficulty. Patient safety maintained, side rails up x2, bed low and locked position. Pt reports no pain overnight. Fundus midline, firm, with light lochia. VSS. Significant other at bedside; parents responding to infant cues and bonding appropriately.

## 2023-10-30 NOTE — PLAN OF CARE
Pt desires to pump and bottle feed. Patient to pump 8x/day to stimulation and maintain full milk supply.

## 2023-10-30 NOTE — DISCHARGE INSTRUCTIONS
Follow Up/Patient Instructions:   1. Reviewed postpartum recommendations and precautions ~ encouraged to call for fever, severe or increased pain in head, chest, abdomen, perineum or legs; heavy bleeding, foul smelling lochia, signs of depression, or any other concern. Reviewed postpartum precautions r/t high blood pressure ~ encouraged to call for HA, vision changes, epigastric discomfort, or abnormal swelling.  2. Rx provided: See med rec  3. Contraception: unsure; will f/u at postpartum visit. Encouraged abstinence and pelvic rest for healing until after postpartum check-up.   4. Encouraged to continue PNV while breastfeeding or at least for 6 weeks postpartum. Conintue Fe.  5. Car seat law will be reviewed with patient at discharge per protocol  6. RTO in 4-6 weeks or sooner prn.  7. Discharge home today with written and verbal postpartum instructions and precautions.

## 2023-11-21 DIAGNOSIS — Z82.49 FAMILY HISTORY OF CARDIAC ARREST: Primary | ICD-10-CM

## 2023-12-05 ENCOUNTER — TELEPHONE (OUTPATIENT)
Dept: OBSTETRICS AND GYNECOLOGY | Facility: CLINIC | Age: 23
End: 2023-12-05

## 2023-12-05 ENCOUNTER — POSTPARTUM VISIT (OUTPATIENT)
Dept: OBSTETRICS AND GYNECOLOGY | Facility: CLINIC | Age: 23
End: 2023-12-05
Attending: OBSTETRICS & GYNECOLOGY
Payer: MEDICAID

## 2023-12-05 ENCOUNTER — HOSPITAL ENCOUNTER (EMERGENCY)
Facility: OTHER | Age: 23
Discharge: PSYCHIATRIC OR SUBSTANCE USE FACILITY | End: 2023-12-06
Attending: EMERGENCY MEDICINE
Payer: MEDICAID

## 2023-12-05 VITALS
SYSTOLIC BLOOD PRESSURE: 112 MMHG | HEIGHT: 64 IN | WEIGHT: 171.94 LBS | BODY MASS INDEX: 29.35 KG/M2 | DIASTOLIC BLOOD PRESSURE: 64 MMHG

## 2023-12-05 DIAGNOSIS — A59.9 TRICHOMONIASIS: ICD-10-CM

## 2023-12-05 DIAGNOSIS — F32.A DEPRESSION WITH SUICIDAL IDEATION: ICD-10-CM

## 2023-12-05 DIAGNOSIS — Z00.8 MEDICAL CLEARANCE FOR PSYCHIATRIC ADMISSION: ICD-10-CM

## 2023-12-05 DIAGNOSIS — R45.851 SUICIDAL IDEATION: ICD-10-CM

## 2023-12-05 DIAGNOSIS — F43.21 SITUATIONAL DEPRESSION: ICD-10-CM

## 2023-12-05 DIAGNOSIS — R45.851 DEPRESSION WITH SUICIDAL IDEATION: ICD-10-CM

## 2023-12-05 LAB
ALBUMIN SERPL BCP-MCNC: 3.7 G/DL (ref 3.5–5.2)
ALP SERPL-CCNC: 110 U/L (ref 55–135)
ALT SERPL W/O P-5'-P-CCNC: 18 U/L (ref 10–44)
AMPHET+METHAMPHET UR QL: NEGATIVE
ANION GAP SERPL CALC-SCNC: 6 MMOL/L (ref 8–16)
AST SERPL-CCNC: 21 U/L (ref 10–40)
B-HCG UR QL: NEGATIVE
BACTERIA #/AREA URNS HPF: ABNORMAL /HPF
BARBITURATES UR QL SCN>200 NG/ML: NEGATIVE
BASOPHILS # BLD AUTO: 0.03 K/UL (ref 0–0.2)
BASOPHILS NFR BLD: 0.6 % (ref 0–1.9)
BENZODIAZ UR QL SCN>200 NG/ML: NEGATIVE
BILIRUB SERPL-MCNC: 0.5 MG/DL (ref 0.1–1)
BILIRUB UR QL STRIP: NEGATIVE
BUN SERPL-MCNC: 8 MG/DL (ref 6–20)
BZE UR QL SCN: NEGATIVE
CALCIUM SERPL-MCNC: 9.2 MG/DL (ref 8.7–10.5)
CANNABINOIDS UR QL SCN: ABNORMAL
CHLORIDE SERPL-SCNC: 109 MMOL/L (ref 95–110)
CLARITY UR: ABNORMAL
CO2 SERPL-SCNC: 25 MMOL/L (ref 23–29)
COLOR UR: YELLOW
CREAT SERPL-MCNC: 0.8 MG/DL (ref 0.5–1.4)
CREAT UR-MCNC: 138.8 MG/DL (ref 15–325)
CTP QC/QA: YES
DIFFERENTIAL METHOD: ABNORMAL
EOSINOPHIL # BLD AUTO: 0.1 K/UL (ref 0–0.5)
EOSINOPHIL NFR BLD: 1.1 % (ref 0–8)
ERYTHROCYTE [DISTWIDTH] IN BLOOD BY AUTOMATED COUNT: 12.1 % (ref 11.5–14.5)
EST. GFR  (NO RACE VARIABLE): >60 ML/MIN/1.73 M^2
ETHANOL SERPL-MCNC: <10 MG/DL
GLUCOSE SERPL-MCNC: 99 MG/DL (ref 70–110)
GLUCOSE UR QL STRIP: NEGATIVE
HCT VFR BLD AUTO: 37.7 % (ref 37–48.5)
HGB BLD-MCNC: 13.2 G/DL (ref 12–16)
HGB UR QL STRIP: ABNORMAL
IMM GRANULOCYTES # BLD AUTO: 0.01 K/UL (ref 0–0.04)
IMM GRANULOCYTES NFR BLD AUTO: 0.2 % (ref 0–0.5)
KETONES UR QL STRIP: NEGATIVE
LEUKOCYTE ESTERASE UR QL STRIP: ABNORMAL
LYMPHOCYTES # BLD AUTO: 2.5 K/UL (ref 1–4.8)
LYMPHOCYTES NFR BLD: 47.3 % (ref 18–48)
MCH RBC QN AUTO: 28.2 PG (ref 27–31)
MCHC RBC AUTO-ENTMCNC: 35 G/DL (ref 32–36)
MCV RBC AUTO: 81 FL (ref 82–98)
METHADONE UR QL SCN>300 NG/ML: NEGATIVE
MICROSCOPIC COMMENT: ABNORMAL
MONOCYTES # BLD AUTO: 0.4 K/UL (ref 0.3–1)
MONOCYTES NFR BLD: 7.7 % (ref 4–15)
NEUTROPHILS # BLD AUTO: 2.3 K/UL (ref 1.8–7.7)
NEUTROPHILS NFR BLD: 43.1 % (ref 38–73)
NITRITE UR QL STRIP: NEGATIVE
NRBC BLD-RTO: 0 /100 WBC
OPIATES UR QL SCN: NEGATIVE
PCP UR QL SCN>25 NG/ML: NEGATIVE
PH UR STRIP: 7 [PH] (ref 5–8)
PLATELET # BLD AUTO: 250 K/UL (ref 150–450)
PMV BLD AUTO: 10.1 FL (ref 9.2–12.9)
POTASSIUM SERPL-SCNC: 3.6 MMOL/L (ref 3.5–5.1)
PROT SERPL-MCNC: 6.9 G/DL (ref 6–8.4)
PROT UR QL STRIP: NEGATIVE
RBC # BLD AUTO: 4.68 M/UL (ref 4–5.4)
RBC #/AREA URNS HPF: 8 /HPF (ref 0–4)
SODIUM SERPL-SCNC: 140 MMOL/L (ref 136–145)
SP GR UR STRIP: 1.01 (ref 1–1.03)
SQUAMOUS #/AREA URNS HPF: 10 /HPF
TOXICOLOGY INFORMATION: ABNORMAL
TRICHOMONAS UR QL MICRO: ABNORMAL
URN SPEC COLLECT METH UR: ABNORMAL
UROBILINOGEN UR STRIP-ACNC: NEGATIVE EU/DL
WBC # BLD AUTO: 5.22 K/UL (ref 3.9–12.7)
WBC #/AREA URNS HPF: 17 /HPF (ref 0–5)

## 2023-12-05 PROCEDURE — 99285 EMERGENCY DEPT VISIT HI MDM: CPT | Mod: 27

## 2023-12-05 PROCEDURE — 81000 URINALYSIS NONAUTO W/SCOPE: CPT | Mod: 59 | Performed by: EMERGENCY MEDICINE

## 2023-12-05 PROCEDURE — 36415 COLL VENOUS BLD VENIPUNCTURE: CPT | Performed by: EMERGENCY MEDICINE

## 2023-12-05 PROCEDURE — 81025 URINE PREGNANCY TEST: CPT | Performed by: EMERGENCY MEDICINE

## 2023-12-05 PROCEDURE — 99213 OFFICE O/P EST LOW 20 MIN: CPT | Mod: PBBFAC,PN | Performed by: OBSTETRICS & GYNECOLOGY

## 2023-12-05 PROCEDURE — 99215 PR OFFICE/OUTPT VISIT, EST, LEVL V, 40-54 MIN: ICD-10-PCS | Mod: 95,AF,HB, | Performed by: PSYCHIATRY & NEUROLOGY

## 2023-12-05 PROCEDURE — 99999 PR PBB SHADOW E&M-EST. PATIENT-LVL III: CPT | Mod: PBBFAC,,, | Performed by: OBSTETRICS & GYNECOLOGY

## 2023-12-05 PROCEDURE — 59430 PR CARE AFTER DELIVERY ONLY: ICD-10-PCS | Mod: ,,, | Performed by: OBSTETRICS & GYNECOLOGY

## 2023-12-05 PROCEDURE — 25000003 PHARM REV CODE 250: Performed by: INTERNAL MEDICINE

## 2023-12-05 PROCEDURE — 99999 PR PBB SHADOW E&M-EST. PATIENT-LVL III: ICD-10-PCS | Mod: PBBFAC,,, | Performed by: OBSTETRICS & GYNECOLOGY

## 2023-12-05 PROCEDURE — 85025 COMPLETE CBC W/AUTO DIFF WBC: CPT | Performed by: EMERGENCY MEDICINE

## 2023-12-05 PROCEDURE — 82077 ASSAY SPEC XCP UR&BREATH IA: CPT | Performed by: EMERGENCY MEDICINE

## 2023-12-05 PROCEDURE — 80053 COMPREHEN METABOLIC PANEL: CPT | Performed by: EMERGENCY MEDICINE

## 2023-12-05 PROCEDURE — 99215 OFFICE O/P EST HI 40 MIN: CPT | Mod: 95,AF,HB, | Performed by: PSYCHIATRY & NEUROLOGY

## 2023-12-05 PROCEDURE — 87086 URINE CULTURE/COLONY COUNT: CPT | Performed by: EMERGENCY MEDICINE

## 2023-12-05 PROCEDURE — 80307 DRUG TEST PRSMV CHEM ANLYZR: CPT | Performed by: EMERGENCY MEDICINE

## 2023-12-05 RX ORDER — METRONIDAZOLE 500 MG/1
500 TABLET ORAL EVERY 12 HOURS
Qty: 14 TABLET | Refills: 0 | Status: ON HOLD | OUTPATIENT
Start: 2023-12-05 | End: 2023-12-07 | Stop reason: SDUPTHER

## 2023-12-05 RX ORDER — LORAZEPAM 0.5 MG/1
0.5 TABLET ORAL
Status: DISCONTINUED | OUTPATIENT
Start: 2023-12-05 | End: 2023-12-06 | Stop reason: HOSPADM

## 2023-12-05 RX ORDER — DIPHENHYDRAMINE HYDROCHLORIDE 50 MG/ML
25 INJECTION INTRAMUSCULAR; INTRAVENOUS
Status: DISCONTINUED | OUTPATIENT
Start: 2023-12-05 | End: 2023-12-06 | Stop reason: HOSPADM

## 2023-12-05 RX ORDER — METRONIDAZOLE 500 MG/1
500 TABLET ORAL
Status: COMPLETED | OUTPATIENT
Start: 2023-12-05 | End: 2023-12-05

## 2023-12-05 RX ORDER — LORAZEPAM 2 MG/ML
2 INJECTION INTRAMUSCULAR
Status: DISCONTINUED | OUTPATIENT
Start: 2023-12-05 | End: 2023-12-06 | Stop reason: HOSPADM

## 2023-12-05 RX ORDER — HALOPERIDOL 5 MG/ML
5 INJECTION INTRAMUSCULAR
Status: DISCONTINUED | OUTPATIENT
Start: 2023-12-05 | End: 2023-12-06 | Stop reason: HOSPADM

## 2023-12-05 RX ADMIN — METRONIDAZOLE 500 MG: 500 TABLET ORAL at 10:12

## 2023-12-05 NOTE — PROGRESS NOTES
"Past medical, surgical, social, family, and obstetric histories; medications; prior records and results; and available outside records were reviewed and updated in the EMR.  Pertinent findings were noted below.    Reason for Visit   Postpartum Follow-up (C/O back pain and vaginal soreness )    HPI   23 y.o. female     Patient's last menstrual period was 2023.    Delivery:  on 10/28/2023  Hospitalization: Uncomplicated  Ambulating, tolerating Po, moving bowels: Y  Pain controlled: Y but does endorse intermittent back pain.  Bleeding: Minimal-none  Breastfeeding: N  Breast pain or engorgement: N  Postpartum depression: 21. Patient tearful in examination room. Reports infant recently passed. She has active thoughts of suicidal ideation, including crashing her vehicle, drowning in tub, and overdose. Patient last had thoughts like this yesterday. She also reports frequent flashbacks of incident which triggers suicidal ideation. States no guns at home. Feels well supported by her partner and mother.  Incision: N/A  Contraception: None, desires future pregnancy      Pap: 2023, NILM  Mammogram: N/A  Allergies: Patient has no known allergies.    Exam   /64   Ht 5' 4" (1.626 m)   Wt 78 kg (171 lb 15.3 oz)   LMP 2023   BMI 29.52 kg/m²     Physical Exam  Genitourinary:      Genitourinary Comments: Periurethral lacerations healing appropriately. No sutures visible on exam.      Right Labia: No rash, tenderness or Bartholin's cyst.     Left Labia: No tenderness, Bartholin's cyst or rash.     No vaginal discharge or bleeding.      No cervical discharge or lesion.      Pelvic exam was performed with patient in the lithotomy position.   Vitals reviewed. Exam conducted with a chaperone present.       Assessment and Plan   Postpartum exam    Suicidal ideation    Situational depression      Postpartum  Exam: WNL  Mood / depression screening: score 21 (see below)  Lactation referral: " N/A  Contraception: None, desires future pregnancy  Suicidal Ideation  Infant  with resulting severe depression, flashbacks and suicidal ideation  Discussed with L&D team. Patient obstetrically cleared and can be evaluated in ED for URGENT telepsych consult  Patient agrees with plan. Accompanied by partner in clinic today who will transport patient to ED      Follow-up: PRN or for annual WWE       David Perez MD PGY1  Obstetrics and Gynecology    Seen and examined.  Agree with note.  All questions answered  RONNELL Montoya MD

## 2023-12-05 NOTE — Clinical Note
Freeman Linder accompanied their family member to the emergency department on 12/5/2023. They may return to work on 12/07/2023.      If you have any questions or concerns, please don't hesitate to call.       RN

## 2023-12-05 NOTE — PROGRESS NOTES
Late entry from 4:45pm    Patient had not arrived to ER to be evaluated    Called 911  Gave patient name, , address and that patient was suicidal. Police dispatched.

## 2023-12-05 NOTE — LETTER
December 5, 2023      [facility]  [address]  [facility] [address]  [facility] [address]  Phone: [phone]  Fax: [phone]       Patient: Antonio Holt   YOB: 2000  Date of Visit: 12/05/2023    To Whom It May Concern:    Kevin Holt has her care at [facility]. She was seen on 12/05/2023. The patient needs 12 weeks off from work due to traumatic life event. If you have any questions or concerns, or if I can be of further assistance, please do not hesitate to contact me.    Sincerely,    Merissa Montoya MD

## 2023-12-06 ENCOUNTER — HOSPITAL ENCOUNTER (INPATIENT)
Facility: HOSPITAL | Age: 23
LOS: 1 days | Discharge: HOME | DRG: 776 | End: 2023-12-07
Attending: PSYCHIATRY & NEUROLOGY | Admitting: PSYCHIATRY & NEUROLOGY
Payer: MEDICAID

## 2023-12-06 VITALS
WEIGHT: 175 LBS | HEIGHT: 64 IN | BODY MASS INDEX: 29.88 KG/M2 | RESPIRATION RATE: 16 BRPM | DIASTOLIC BLOOD PRESSURE: 76 MMHG | OXYGEN SATURATION: 98 % | HEART RATE: 77 BPM | SYSTOLIC BLOOD PRESSURE: 118 MMHG | TEMPERATURE: 97 F

## 2023-12-06 DIAGNOSIS — Z63.4 BEREAVEMENT: Primary | ICD-10-CM

## 2023-12-06 DIAGNOSIS — F32.A DEPRESSION WITH SUICIDAL IDEATION: ICD-10-CM

## 2023-12-06 DIAGNOSIS — R45.851 DEPRESSION WITH SUICIDAL IDEATION: ICD-10-CM

## 2023-12-06 PROBLEM — F17.200 TOBACCO USE DISORDER: Status: ACTIVE | Noted: 2023-12-06

## 2023-12-06 PROBLEM — F12.90 MARIJUANA USE: Status: ACTIVE | Noted: 2023-12-06

## 2023-12-06 PROBLEM — A59.9 TRICHOMONAS INFECTION: Status: ACTIVE | Noted: 2023-12-06

## 2023-12-06 PROCEDURE — 99231 SBSQ HOSP IP/OBS SF/LOW 25: CPT | Mod: SA,HB,, | Performed by: PHYSICIAN ASSISTANT

## 2023-12-06 PROCEDURE — 25000003 PHARM REV CODE 250: Performed by: PHYSICIAN ASSISTANT

## 2023-12-06 PROCEDURE — 90833 PR PSYCHOTHERAPY W/PATIENT W/E&M, 30 MIN (ADD ON): ICD-10-PCS | Mod: AF,HB,, | Performed by: PSYCHIATRY & NEUROLOGY

## 2023-12-06 PROCEDURE — 99223 PR INITIAL HOSPITAL CARE,LEVL III: ICD-10-PCS | Mod: AF,HB,, | Performed by: PSYCHIATRY & NEUROLOGY

## 2023-12-06 PROCEDURE — 11400000 HC PSYCH PRIVATE ROOM

## 2023-12-06 PROCEDURE — 99231 PR SUBSEQUENT HOSPITAL CARE,LEVL I: ICD-10-PCS | Mod: SA,HB,, | Performed by: PHYSICIAN ASSISTANT

## 2023-12-06 PROCEDURE — 90833 PSYTX W PT W E/M 30 MIN: CPT | Mod: AF,HB,, | Performed by: PSYCHIATRY & NEUROLOGY

## 2023-12-06 PROCEDURE — 99223 1ST HOSP IP/OBS HIGH 75: CPT | Mod: AF,HB,, | Performed by: PSYCHIATRY & NEUROLOGY

## 2023-12-06 RX ORDER — OLANZAPINE 10 MG/1
10 TABLET ORAL EVERY 8 HOURS PRN
Status: DISCONTINUED | OUTPATIENT
Start: 2023-12-06 | End: 2023-12-07 | Stop reason: HOSPADM

## 2023-12-06 RX ORDER — ONDANSETRON 4 MG/1
4 TABLET, ORALLY DISINTEGRATING ORAL EVERY 8 HOURS PRN
Status: DISCONTINUED | OUTPATIENT
Start: 2023-12-06 | End: 2023-12-07 | Stop reason: HOSPADM

## 2023-12-06 RX ORDER — OLANZAPINE 10 MG/2ML
10 INJECTION, POWDER, FOR SOLUTION INTRAMUSCULAR EVERY 8 HOURS PRN
Status: DISCONTINUED | OUTPATIENT
Start: 2023-12-06 | End: 2023-12-07 | Stop reason: HOSPADM

## 2023-12-06 RX ORDER — BENZTROPINE MESYLATE 1 MG/ML
2 INJECTION, SOLUTION INTRAMUSCULAR; INTRAVENOUS EVERY 8 HOURS PRN
Status: DISCONTINUED | OUTPATIENT
Start: 2023-12-06 | End: 2023-12-07 | Stop reason: HOSPADM

## 2023-12-06 RX ORDER — MAG HYDROX/ALUMINUM HYD/SIMETH 200-200-20
30 SUSPENSION, ORAL (FINAL DOSE FORM) ORAL EVERY 6 HOURS PRN
Status: DISCONTINUED | OUTPATIENT
Start: 2023-12-06 | End: 2023-12-07 | Stop reason: HOSPADM

## 2023-12-06 RX ORDER — PROMETHAZINE HYDROCHLORIDE 25 MG/1
25 TABLET ORAL EVERY 6 HOURS PRN
Status: DISCONTINUED | OUTPATIENT
Start: 2023-12-06 | End: 2023-12-07 | Stop reason: HOSPADM

## 2023-12-06 RX ORDER — OLANZAPINE 10 MG/2ML
10 INJECTION, POWDER, FOR SOLUTION INTRAMUSCULAR EVERY 8 HOURS PRN
Status: DISCONTINUED | OUTPATIENT
Start: 2023-12-06 | End: 2023-12-06

## 2023-12-06 RX ORDER — HYDROXYZINE HYDROCHLORIDE 25 MG/1
50 TABLET, FILM COATED ORAL EVERY 6 HOURS PRN
Status: DISCONTINUED | OUTPATIENT
Start: 2023-12-07 | End: 2023-12-06

## 2023-12-06 RX ORDER — IBUPROFEN 200 MG
1 TABLET ORAL DAILY PRN
Status: DISCONTINUED | OUTPATIENT
Start: 2023-12-07 | End: 2023-12-06

## 2023-12-06 RX ORDER — ACETAMINOPHEN 325 MG/1
650 TABLET ORAL EVERY 6 HOURS PRN
Status: DISCONTINUED | OUTPATIENT
Start: 2023-12-06 | End: 2023-12-07 | Stop reason: HOSPADM

## 2023-12-06 RX ORDER — METRONIDAZOLE 500 MG/1
500 TABLET ORAL EVERY 12 HOURS
Status: DISCONTINUED | OUTPATIENT
Start: 2023-12-06 | End: 2023-12-07 | Stop reason: HOSPADM

## 2023-12-06 RX ORDER — HYDROXYZINE PAMOATE 50 MG/1
50 CAPSULE ORAL EVERY 6 HOURS PRN
Status: DISCONTINUED | OUTPATIENT
Start: 2023-12-06 | End: 2023-12-07 | Stop reason: HOSPADM

## 2023-12-06 RX ORDER — OLANZAPINE 10 MG/1
10 TABLET ORAL EVERY 8 HOURS PRN
Status: DISCONTINUED | OUTPATIENT
Start: 2023-12-06 | End: 2023-12-06

## 2023-12-06 RX ORDER — BENZONATATE 100 MG/1
100 CAPSULE ORAL 3 TIMES DAILY PRN
Status: DISCONTINUED | OUTPATIENT
Start: 2023-12-06 | End: 2023-12-07 | Stop reason: HOSPADM

## 2023-12-06 RX ORDER — LOPERAMIDE HYDROCHLORIDE 2 MG/1
2 CAPSULE ORAL
Status: DISCONTINUED | OUTPATIENT
Start: 2023-12-06 | End: 2023-12-07 | Stop reason: HOSPADM

## 2023-12-06 RX ORDER — IBUPROFEN 200 MG
1 TABLET ORAL DAILY PRN
Status: DISCONTINUED | OUTPATIENT
Start: 2023-12-06 | End: 2023-12-07 | Stop reason: HOSPADM

## 2023-12-06 RX ADMIN — METRONIDAZOLE 500 MG: 500 TABLET ORAL at 08:12

## 2023-12-06 RX ADMIN — METRONIDAZOLE 500 MG: 500 TABLET ORAL at 02:12

## 2023-12-06 SDOH — SOCIAL DETERMINANTS OF HEALTH (SDOH): DISSAPEARANCE AND DEATH OF FAMILY MEMBER: Z63.4

## 2023-12-06 NOTE — ED PROVIDER NOTES
"Encounter Date: 12/5/2023    SCRIBE #1 NOTE: I, Yimi Harper, am scribing for, and in the presence of,  Jonathan Worrell II, MD. I have scribed the following portions of the note - Other sections scribed: HPI, PE.       History     Chief Complaint   Patient presents with    Psychiatric Evaluation     Pt presents to the ED after telling OB nurse she has thoughts of harming self. Pt had baby on 11/28 and has suffered with PPD since. Denies past SI attempts. Denies HI/AH/VH.      Time seen by provider: 7:06 PM    This is a 23 y.o. female who presents for a psychiatric evaluation. She recently had a child, but the child passed away. She went to her regular PPD check up at her OB and admitted to "irrational things" due to being emotional. She had admitted to them that she was suicidal and has been sent here to be evaluated further. She denies any SI in the ED, and denies any plan to hurt herself. She denies any AH/VH/HI. She denies any history of smoking, drinking, or drug use. Additionally, her boyfriend who is here with her voices that he has no concerns for her wellbeing at this time.    Patient denies any other complaints at this time.    The history is provided by the patient.     Review of patient's allergies indicates:  No Known Allergies  No past medical history on file.  No past surgical history on file.  Family History   Problem Relation Age of Onset    No Known Problems Mother     No Known Problems Father     No Known Problems Sister     No Known Problems Sister     No Known Problems Brother     No Known Problems Brother     Early death Son     SIDS Son     No Known Problems Maternal Aunt     No Known Problems Maternal Uncle     No Known Problems Paternal Aunt     Drug abuse Maternal Grandmother     Alcohol abuse Maternal Grandmother     Rectal cancer Maternal Grandmother     No Known Problems Maternal Grandfather     No Known Problems Paternal Grandmother     No Known Problems Paternal Grandfather     Breast " cancer Neg Hx     Colon cancer Neg Hx     Ovarian cancer Neg Hx      Social History     Tobacco Use    Smoking status: Never    Smokeless tobacco: Never   Substance Use Topics    Alcohol use: Not Currently    Drug use: Yes     Types: Marijuana     Review of Systems  SEE HPI.  Physical Exam     Initial Vitals [12/05/23 1834]   BP Pulse Resp Temp SpO2   (!) 132/92 82 18 97.3 °F (36.3 °C) 97 %      MAP       --         Physical Exam    Constitutional: She appears well-developed and well-nourished. She is not diaphoretic. No distress.   HENT:   Head: Normocephalic and atraumatic.   Right Ear: External ear normal.   Left Ear: External ear normal.   Nose: Nose normal.   Mouth/Throat: Oropharynx is clear and moist.   Eyes: Conjunctivae and EOM are normal. Pupils are equal, round, and reactive to light. No scleral icterus.   No pallor   Neck: Neck supple. No JVD present.   Normal range of motion.  Pulmonary/Chest: No respiratory distress.   Musculoskeletal:         General: No edema. Normal range of motion.      Cervical back: Normal range of motion and neck supple.     Lymphadenopathy:     She has no cervical adenopathy.   Neurological: She is alert and oriented to person, place, and time. She has normal strength. No cranial nerve deficit or sensory deficit.   Skin: Skin is warm and dry. No rash noted.   Psychiatric: Her behavior is normal. Judgment and thought content normal. Her affect is blunt. She expresses no suicidal ideation.   Blunted affect, but not tearful or sad-appearing.         ED Course   Procedures  Labs Reviewed   CBC W/ AUTO DIFFERENTIAL - Abnormal; Notable for the following components:       Result Value    MCV 81 (*)     All other components within normal limits   COMPREHENSIVE METABOLIC PANEL - Abnormal; Notable for the following components:    Anion Gap 6 (*)     All other components within normal limits   URINALYSIS, REFLEX TO URINE CULTURE - Abnormal; Notable for the following components:     Appearance, UA Hazy (*)     Occult Blood UA 1+ (*)     Leukocytes, UA 3+ (*)     All other components within normal limits    Narrative:     Specimen Source->Urine   DRUG SCREEN PANEL, URINE EMERGENCY - Abnormal; Notable for the following components:    THC Presumptive Positive (*)     All other components within normal limits    Narrative:     Specimen Source->Urine   URINALYSIS MICROSCOPIC - Abnormal; Notable for the following components:    RBC, UA 8 (*)     WBC, UA 17 (*)     Trichomonas, UA Occasional (*)     All other components within normal limits    Narrative:     Specimen Source->Urine   CULTURE, URINE   ALCOHOL,MEDICAL (ETHANOL)   POCT URINE PREGNANCY          Imaging Results    None          Medications   metroNIDAZOLE tablet 500 mg (500 mg Oral Given 12/5/23 2201)     Medical Decision Making  Amount and/or Complexity of Data Reviewed  External Data Reviewed: notes.  Labs: ordered. Decision-making details documented in ED Course.    Risk  Prescription drug management.    Patient presents accompanied by significant other, directed to come to the emergency department after she had a postpartum visit with gyn earlier today and at that time she endorsed depression, relating to death of her infant at 5 weeks.  During the interview she did endorse having multiple thoughts of suicide with various plans.  The patient was supposed to come directly to the emergency department for observation but did not.  The gyn service therefore reached out to the police to have her brought for evaluation.  The patient states that the police showed up at her mother's house and when she found out about this she and her significant other came to the emergency department for the recommended evaluation.  During my interview with her she states that she was just feeling very emotional at the earlier visit.  She does admit to make any above comments but currently is adamant that she does not actually want to hurt herself.  Her  significant other with her also does not feel that she is a danger to herself.  They live together.  They are agreeable to receiving a tele psych evaluation.  Her laboratory studies are unremarkable.  ETOH is negative.  She is medically cleared for psychiatric evaluation treatment.  Her care is turned over at 9:00 p.m. pending tele psych evaluation        Scribe Attestation:   Scribe #1: I performed the above scribed service and the documentation accurately describes the services I performed. I attest to the accuracy of the note.        ED Course as of 12/07/23 1202   Tue Dec 05, 2023   2115 Received sign-out from off going provider.  Patient presented for psychiatric evaluation.  She denies  SI/HI/AVH.  Noted trichomoniasis on urinalysis, patient had no symptoms.  Urine culture pending.  Urinalysis not consistent with UTI.  Will treat with metronidazole.  Pending psychiatric evaluation and recommendations.  [HM]   2202 Spoke with on call telepsych, Erwin Mcarthur, after telepsych evaluation. Patient denies any SI or thoughts of self harm, but based on his evaluation, she did not have full insight and due to caution for her safety, recommends a PEC with plans for inpatient psychiatry.  Patient is medically cleared for transfer to inpatient psychiatry.  She will need to take metronidazole for trichomoniasis. [HM]   2346 Temp(!): 94.5 °F (34.7 °C)  Thought to be incidental.  Repeat temperature is normal. [HM]   Wed Dec 06, 2023   0027 Temp: 97.4 °F (36.3 °C) [HM]      ED Course User Index  [HM] Rock Briggs MD     Physician Attestation for Scribe: I, [facility], reviewed documentation as scribed in my presence, which is both accurate and complete.   Medically cleared for psychiatry placement: 12/5/2023 10:06 PM                   Clinical Impression:  Final diagnoses:  [F53.0] Post partum depression (Primary)  [A59.9] Trichomoniasis  [Z00.8] Medical clearance for psychiatric admission  [F32.A, R45.851] Depression with  suicidal ideation          ED Disposition Condition    Transfer to Psych Facility Stable          ED Prescriptions       Medication Sig Dispense Start Date End Date Auth. Provider    metroNIDAZOLE (FLAGYL) 500 MG tablet (Expires today) Take 1 tablet (500 mg total) by mouth every 12 (twelve) hours. for 7 days 14 tablet 12/5/2023 12/7/2023 Rock Briggs MD          Follow-up Information    None          Jonathan Worrell II, MD  12/05/23 2051       Jonathan Worrell II, MD  12/07/23 1203

## 2023-12-06 NOTE — PLAN OF CARE
Pt is calm and cooperative.  Denies any S/I or H/I at this time.  Mood appropriate.  Behavior/interaction appropriate on unit.  No acute distress apparent at this time, will continue to monitor.

## 2023-12-06 NOTE — HPI
Patient is a 23 year old female with THC use was admitted to [facility] for depression.  Hospital medicine consulted for medical management.

## 2023-12-06 NOTE — PSYCH
PLPC discussed with pt about attending grief counseling upon discharge. PLPC provided pt with handouts to the [employer] [facility] and to [facility] where pt could receive free grief counseling. Pt states she will try and go the all the meetings to help her grieve her son.

## 2023-12-06 NOTE — NURSING
Pt up to unit per w/c, accompanied by security and [place].  Wanded, no contraband found.  Ambulated onto unit.

## 2023-12-06 NOTE — SUBJECTIVE & OBJECTIVE
History reviewed. No pertinent past medical history.    History reviewed. No pertinent surgical history.    Review of patient's allergies indicates:  No Known Allergies    Current Facility-Administered Medications on File Prior to Encounter   Medication    [COMPLETED] metroNIDAZOLE tablet 500 mg    [DISCONTINUED] diphenhydrAMINE injection 25 mg    [DISCONTINUED] haloperidol lactate injection 5 mg    [DISCONTINUED] LORazepam injection 2 mg    [DISCONTINUED] LORazepam tablet 0.5 mg     Current Outpatient Medications on File Prior to Encounter   Medication Sig    metroNIDAZOLE (FLAGYL) 500 MG tablet Take 1 tablet (500 mg total) by mouth every 12 (twelve) hours. for 7 days    docusate sodium (COLACE) 100 MG capsule Take 2 capsules (200 mg total) by mouth 2 (two) times daily as needed for Constipation.    ferrous sulfate 325 (65 FE) MG EC tablet Take 1 tablet (325 mg total) by mouth 3 (three) times daily with meals.    PNV,calcium 72/iron/folic acid (PRENATAL VITAMIN) Tab Take 1 tablet by mouth once daily.     Family History       Problem Relation (Age of Onset)    Alcohol abuse Maternal Grandmother    Drug abuse Maternal Grandmother    Early death Son    No Known Problems Mother, Father, Sister, Sister, Brother, Brother, Maternal Aunt, Maternal Uncle, Paternal Aunt, Maternal Grandfather, Paternal Grandmother, Paternal Grandfather    Rectal cancer Maternal Grandmother    SIDS Son          Tobacco Use    Smoking status: Never    Smokeless tobacco: Never   Substance and Sexual Activity    Alcohol use: Not Currently    Drug use: Yes     Types: Marijuana    Sexual activity: Not Currently     Partners: Male     Review of Systems   Constitutional:  Negative for chills and fever.   HENT:  Negative for congestion and drooling.    Respiratory:  Negative for cough and shortness of breath.    Cardiovascular:  Negative for chest pain and leg swelling.   Gastrointestinal:  Negative for constipation, diarrhea, nausea and vomiting.    Genitourinary:  Negative for difficulty urinating and dysuria.   Musculoskeletal:  Negative for arthralgias and gait problem.     Objective:     Vital Signs (Most Recent):  Temp: 97.5 °F (36.4 °C) (12/06/23 0728)  Pulse: 68 (12/06/23 0728)  Resp: 18 (12/06/23 0728)  BP: (!) 114/56 (12/06/23 0728)  SpO2: 99 % (12/06/23 0130) Vital Signs (24h Range):  Temp:  [94.5 °F (34.7 °C)-97.9 °F (36.6 °C)] 97.5 °F (36.4 °C)  Pulse:  [68-82] 68  Resp:  [16-18] 18  SpO2:  [97 %-99 %] 99 %  BP: (112-132)/(56-92) 114/56     Weight: 78 kg (171 lb 13.6 oz)  Body mass index is 29.5 kg/m².     Physical Exam  Constitutional:       Appearance: Normal appearance.   HENT:      Head: Normocephalic and atraumatic.   Cardiovascular:      Rate and Rhythm: Normal rate and regular rhythm.   Pulmonary:      Effort: Pulmonary effort is normal. No respiratory distress.      Breath sounds: Normal breath sounds.   Abdominal:      General: Abdomen is flat. There is no distension.      Palpations: Abdomen is soft.   Musculoskeletal:      Right lower leg: No edema.      Left lower leg: No edema.   Skin:     General: Skin is warm and dry.   Neurological:      Mental Status: She is alert and oriented to person, place, and time. Mental status is at baseline.      Cranial Nerves: Cranial nerves 2-12 are intact.          Significant Labs: UPT  Results for orders placed or performed during the hospital encounter of 12/05/23   POCT urine pregnancy   Result Value Ref Range    POC Preg Test, Ur Negative Negative     Acceptable Yes      U/A  Positive for leukocytes and tricomonas   UDS  Results for orders placed or performed during the hospital encounter of 12/05/23   Drug screen panel, emergency   Result Value Ref Range    Benzodiazepines Negative Negatiive    Methadone metabolites Negative Negative    Cocaine (Metab.) Negative Negative    Opiate Scrn, Ur Negative Negative    Barbiturate Screen, Ur Negative Negative    Amphetamine Screen, Ur  Negative Negative    THC Presumptive Positive (A) Negative    Phencyclidine Negative Negative    Creatinine, Urine 138.8 15.0 - 325.0 mg/dL    Toxicology Information SEE COMMENT      CBC  Results for orders placed or performed during the hospital encounter of 12/05/23   CBC auto differential   Result Value Ref Range    WBC 5.22 3.90 - 12.70 K/uL    RBC 4.68 4.00 - 5.40 M/uL    Hemoglobin 13.2 12.0 - 16.0 g/dL    Hematocrit 37.7 37.0 - 48.5 %    MCV 81 (L) 82 - 98 fL    MCH 28.2 27.0 - 31.0 pg    MCHC 35.0 32.0 - 36.0 g/dL    RDW 12.1 11.5 - 14.5 %    Platelets 250 150 - 450 K/uL    MPV 10.1 9.2 - 12.9 fL    Immature Granulocytes 0.2 0.0 - 0.5 %    Gran # (ANC) 2.3 1.8 - 7.7 K/uL    Immature Grans (Abs) 0.01 0.00 - 0.04 K/uL    Lymph # 2.5 1.0 - 4.8 K/uL    Mono # 0.4 0.3 - 1.0 K/uL    Eos # 0.1 0.0 - 0.5 K/uL    Baso # 0.03 0.00 - 0.20 K/uL    nRBC 0 0 /100 WBC    Gran % 43.1 38.0 - 73.0 %    Lymph % 47.3 18.0 - 48.0 %    Mono % 7.7 4.0 - 15.0 %    Eosinophil % 1.1 0.0 - 8.0 %    Basophil % 0.6 0.0 - 1.9 %    Differential Method Automated      CMP  Results for orders placed or performed during the hospital encounter of 12/05/23   Comprehensive metabolic panel   Result Value Ref Range    Sodium 140 136 - 145 mmol/L    Potassium 3.6 3.5 - 5.1 mmol/L    Chloride 109 95 - 110 mmol/L    CO2 25 23 - 29 mmol/L    Glucose 99 70 - 110 mg/dL    BUN 8 6 - 20 mg/dL    Creatinine 0.8 0.5 - 1.4 mg/dL    Calcium 9.2 8.7 - 10.5 mg/dL    Total Protein 6.9 6.0 - 8.4 g/dL    Albumin 3.7 3.5 - 5.2 g/dL    Total Bilirubin 0.5 0.1 - 1.0 mg/dL    Alkaline Phosphatase 110 55 - 135 U/L    AST 21 10 - 40 U/L    ALT 18 10 - 44 U/L    eGFR >60 >60 mL/min/1.73 m^2    Anion Gap 6 (L) 8 - 16 mmol/L     TSH  No results found for this or any previous visit.  ETOH  Results for orders placed or performed during the hospital encounter of 12/05/23   Ethanol   Result Value Ref Range    Alcohol, Serum <10 <10 mg/dL     Salicylate  No results found for  this or any previous visit.  Acetaminophen  No results found for this or any previous visit.          Significant Imaging: none

## 2023-12-06 NOTE — CONSULTS
[facility] Medicine  Consult Note    Patient Name: Antonio Holt  MRN: [id number]  Admission Date: 12/6/2023  Hospital Length of Stay: 0 days  Attending Physician: Sebastian Schulz MD   Primary Care Provider: Eliseo Fischer Of           Patient information was obtained from patient and ER records.     Inpatient consult to [facility] for History and Physical  Consult performed by: Devika Dubois PA-C  Consult ordered by: Sebastian Schulz MD        Subjective:     Principal Problem: Depression with suicidal ideation    Chief Complaint: No chief complaint on file.       HPI: Patient is a 23 year old female with THC use was admitted to [facility] for depression.  Hospital medicine consulted for medical management.        History reviewed. No pertinent past medical history.    History reviewed. No pertinent surgical history.    Review of patient's allergies indicates:  No Known Allergies    Current Facility-Administered Medications on File Prior to Encounter   Medication    [COMPLETED] metroNIDAZOLE tablet 500 mg    [DISCONTINUED] diphenhydrAMINE injection 25 mg    [DISCONTINUED] haloperidol lactate injection 5 mg    [DISCONTINUED] LORazepam injection 2 mg    [DISCONTINUED] LORazepam tablet 0.5 mg     Current Outpatient Medications on File Prior to Encounter   Medication Sig    metroNIDAZOLE (FLAGYL) 500 MG tablet Take 1 tablet (500 mg total) by mouth every 12 (twelve) hours. for 7 days    docusate sodium (COLACE) 100 MG capsule Take 2 capsules (200 mg total) by mouth 2 (two) times daily as needed for Constipation.    ferrous sulfate 325 (65 FE) MG EC tablet Take 1 tablet (325 mg total) by mouth 3 (three) times daily with meals.    PNV,calcium 72/iron/folic acid (PRENATAL VITAMIN) Tab Take 1 tablet by mouth once daily.     Family History       Problem Relation (Age of Onset)    Alcohol abuse Maternal Grandmother    Drug abuse Maternal Grandmother    Early death  Son    No Known Problems Mother, Father, Sister, Sister, Brother, Brother, Maternal Aunt, Maternal Uncle, Paternal Aunt, Maternal Grandfather, Paternal Grandmother, Paternal Grandfather    Rectal cancer Maternal Grandmother    SIDS Son          Tobacco Use    Smoking status: Never    Smokeless tobacco: Never   Substance and Sexual Activity    Alcohol use: Not Currently    Drug use: Yes     Types: Marijuana    Sexual activity: Not Currently     Partners: Male     Review of Systems   Constitutional:  Negative for chills and fever.   HENT:  Negative for congestion and drooling.    Respiratory:  Negative for cough and shortness of breath.    Cardiovascular:  Negative for chest pain and leg swelling.   Gastrointestinal:  Negative for constipation, diarrhea, nausea and vomiting.   Genitourinary:  Negative for difficulty urinating and dysuria.   Musculoskeletal:  Negative for arthralgias and gait problem.     Objective:     Vital Signs (Most Recent):  Temp: 97.5 °F (36.4 °C) (12/06/23 0728)  Pulse: 68 (12/06/23 0728)  Resp: 18 (12/06/23 0728)  BP: (!) 114/56 (12/06/23 0728)  SpO2: 99 % (12/06/23 0130) Vital Signs (24h Range):  Temp:  [94.5 °F (34.7 °C)-97.9 °F (36.6 °C)] 97.5 °F (36.4 °C)  Pulse:  [68-82] 68  Resp:  [16-18] 18  SpO2:  [97 %-99 %] 99 %  BP: (112-132)/(56-92) 114/56     Weight: 78 kg (171 lb 13.6 oz)  Body mass index is 29.5 kg/m².     Physical Exam  Constitutional:       Appearance: Normal appearance.   HENT:      Head: Normocephalic and atraumatic.   Cardiovascular:      Rate and Rhythm: Normal rate and regular rhythm.   Pulmonary:      Effort: Pulmonary effort is normal. No respiratory distress.      Breath sounds: Normal breath sounds.   Abdominal:      General: Abdomen is flat. There is no distension.      Palpations: Abdomen is soft.   Musculoskeletal:      Right lower leg: No edema.      Left lower leg: No edema.   Skin:     General: Skin is warm and dry.   Neurological:      Mental Status: She is  alert and oriented to person, place, and time. Mental status is at baseline.      Cranial Nerves: Cranial nerves 2-12 are intact.          Significant Labs: UPT  Results for orders placed or performed during the hospital encounter of 12/05/23   POCT urine pregnancy   Result Value Ref Range    POC Preg Test, Ur Negative Negative     Acceptable Yes      U/A  Positive for leukocytes and tricomonas   UDS  Results for orders placed or performed during the hospital encounter of 12/05/23   Drug screen panel, emergency   Result Value Ref Range    Benzodiazepines Negative Negatiive    Methadone metabolites Negative Negative    Cocaine (Metab.) Negative Negative    Opiate Scrn, Ur Negative Negative    Barbiturate Screen, Ur Negative Negative    Amphetamine Screen, Ur Negative Negative    THC Presumptive Positive (A) Negative    Phencyclidine Negative Negative    Creatinine, Urine 138.8 15.0 - 325.0 mg/dL    Toxicology Information SEE COMMENT      CBC  Results for orders placed or performed during the hospital encounter of 12/05/23   CBC auto differential   Result Value Ref Range    WBC 5.22 3.90 - 12.70 K/uL    RBC 4.68 4.00 - 5.40 M/uL    Hemoglobin 13.2 12.0 - 16.0 g/dL    Hematocrit 37.7 37.0 - 48.5 %    MCV 81 (L) 82 - 98 fL    MCH 28.2 27.0 - 31.0 pg    MCHC 35.0 32.0 - 36.0 g/dL    RDW 12.1 11.5 - 14.5 %    Platelets 250 150 - 450 K/uL    MPV 10.1 9.2 - 12.9 fL    Immature Granulocytes 0.2 0.0 - 0.5 %    Gran # (ANC) 2.3 1.8 - 7.7 K/uL    Immature Grans (Abs) 0.01 0.00 - 0.04 K/uL    Lymph # 2.5 1.0 - 4.8 K/uL    Mono # 0.4 0.3 - 1.0 K/uL    Eos # 0.1 0.0 - 0.5 K/uL    Baso # 0.03 0.00 - 0.20 K/uL    nRBC 0 0 /100 WBC    Gran % 43.1 38.0 - 73.0 %    Lymph % 47.3 18.0 - 48.0 %    Mono % 7.7 4.0 - 15.0 %    Eosinophil % 1.1 0.0 - 8.0 %    Basophil % 0.6 0.0 - 1.9 %    Differential Method Automated      CMP  Results for orders placed or performed during the hospital encounter of 12/05/23   Comprehensive  metabolic panel   Result Value Ref Range    Sodium 140 136 - 145 mmol/L    Potassium 3.6 3.5 - 5.1 mmol/L    Chloride 109 95 - 110 mmol/L    CO2 25 23 - 29 mmol/L    Glucose 99 70 - 110 mg/dL    BUN 8 6 - 20 mg/dL    Creatinine 0.8 0.5 - 1.4 mg/dL    Calcium 9.2 8.7 - 10.5 mg/dL    Total Protein 6.9 6.0 - 8.4 g/dL    Albumin 3.7 3.5 - 5.2 g/dL    Total Bilirubin 0.5 0.1 - 1.0 mg/dL    Alkaline Phosphatase 110 55 - 135 U/L    AST 21 10 - 40 U/L    ALT 18 10 - 44 U/L    eGFR >60 >60 mL/min/1.73 m^2    Anion Gap 6 (L) 8 - 16 mmol/L     TSH  No results found for this or any previous visit.  ETOH  Results for orders placed or performed during the hospital encounter of 12/05/23   Ethanol   Result Value Ref Range    Alcohol, Serum <10 <10 mg/dL     Salicylate  No results found for this or any previous visit.  Acetaminophen  No results found for this or any previous visit.          Significant Imaging: none  Assessment/Plan:     * Depression with suicidal ideation  Defer to psych         Trichomonas infection  Seen on U/A  Flagyl 500mg BID x 5 days       Marijuana use  + THC on UDS  Cessation resources       VTE Risk Mitigation (From admission, onward)      None                Thank you for your consult. I will sign off. Please contact us if you have any additional questions.    Devika Dubois PA-C  Department of [facility] Medicine   [facility]

## 2023-12-06 NOTE — PROGRESS NOTES
"   12/06/23 1000   [facility] Group Therapy   Group Name Stress Management   Specific Interventions Coping Skills Training   Participation Level Sharing;Appropriate   Participation Quality Cooperative   Insight/Motivation Good   Affect/Mood Display Appropriate   Cognition Alert   Psychomotor WNL     Patient presents calm, cooperative, reports "fine" mood. Patient reports she was feeling scared, didn't know what to expect, but after group felt more relaxed and comfortable on the unit. Patient states the loss of a love one caused her stress. Patient verbalized she cope taking quiet time to put her emotions in check, listen to music to feel good and relax and taking a bath/shower to relaxed and feel refreshed.  "

## 2023-12-06 NOTE — PSYCH
[facility] discussed with pt on collateral consent. Pt signed collateral consent for Freeman New/boyfriend [phone] . [facility] attemtped to contact collateral consent to discuss pt admission. [facility]  stated:        Reason for admission- describe what happened?    She has been o.k. and has been doing good and just crying and is not harming or SI. She does have a good support system. And she thought that she could just say things and did not realize what she was saying. She just was venting.  I have been with her for 7 years.       Prior treatment places and dates-doctor name and location  Reason for prior treatment- same or different  How long has patient had problem(s)?    For the past week due to we lost our three week year old son.    Substance abuse- what , how long, how much, how often?    none    Legal Issues- Current charges, type of offense, probation or parole?    None    History of suicide attempts- when and what methods, did they require medical attention?     No prior suicide attempts.     Alcohol Use-  What preference of alcohol, how much, how long, how often?    She dies drink occasionally, but only for social events.    History of violence-describe behaviors and triggers    She has never been violent    Any guns or weapons in the home? If yes, recommend that these be secured.    No guns or weapons in  the home. I will make sure that all sharp objects are secured she she returns home.     What is patients baseline behavior/mood- how well do they know if patient is doing well?     When she is talking and very happy.    What helps the patient stay well?  Internal/external coping strategies ( attending meetings, going to groups, taking medications, spending time with family ( etc)?      Spending time with her family and friends.   Discharge plan:    Where will the patient live upon discharge?    She will go back and live with her mother, myself, and her sister    Who else in the home?  her mother, myself, and her  sister  Will someone be able to pick the patient up when discharged?     I will come to pick her up upon discharge.

## 2023-12-06 NOTE — ASSESSMENT & PLAN NOTE
Defer to psych        Quality 110: Preventive Care And Screening: Influenza Immunization: Influenza Immunization previously received during influenza season Detail Level: Detailed Quality 47: Advance Care Plan: Advance care planning not documented, reason not otherwise specified. Quality 226: Preventive Care And Screening: Tobacco Use: Screening And Cessation Intervention: Patient screened for tobacco use and is an ex/non-smoker

## 2023-12-06 NOTE — NURSING
Report received from Chris MESA at [facility].  Pt is 22yo PEC in with post-partum depression.  Had told her OB today that she was having SI.  Was instructed by OB to go to ER.  However, pt did not and the  ended up bringing pt to ER.  Pt does not want to be admitted.  Pt is medically cleared.  Flagyl given.  Calm, cooperative.  Awaiting transfer to unit.

## 2023-12-06 NOTE — PLAN OF CARE
"Behavioral Health Unit  Psychosocial History and Assessment  Progress Note      Patient Name: Antonio Holt YOB: 2000 SW: JONNY JASSO, [facility] Date: 2023    Chief Complaint: depression and suicidal ideation    Consent:     Did the patient consent for an interview with the ? Yes    Did the patient consent for the  to contact family/friend/caregiver?   Yes  Name: Freeman New, Relationship: boyfriend, and Contact: [phone]    Did the patient give consent for the  to inform family/friend/caregiver of his/her whereabouts or to discuss discharge planning? Yes    Source of Information: Face to face with patient, Telephone interview with family/friend/caregiver, and Chart review    Is information obtained from interviews considered reliable?   yes    Reason for Admission:     Active Hospital Problems    Diagnosis  POA    *Depression with suicidal ideation [F32.A, R45.851]  Not Applicable      Resolved Hospital Problems   No resolved problems to display.       History of Present Illness - (Patient Perception):   Pt states she said some things to get it of her chest due to she was grieving the loss of her son who recently . Pt states her son was 3 weeks old     History of Present Illness - (Perception of Others):   Per Dr. Sebastian Schulz:  2023 10:45 AM   Antonio Holt   2000   [id number]                                        DATE OF ADMISSION: 2023  1:36 AM     SITE: [facility]     CURRENT LEGAL STATUS: PEC and/or CEC        HISTORY    CHIEF COMPLAINT   Antonio Holt is a 23 y.o. female with no past psychiatric history of  urrently admitted to the inpatient unit with the following chief complaint: depression/SI, "I had a moment."     HPI   The patient was seen and examined. The chart was reviewed.     The patient presented to the ER on 2023 . Per staff notes:  -Pt presents to the ED after telling OB nurse she has thoughts of harming " "self. Pt had baby on 11/28 and has suffered with PPD since. Denies past SI attempts. Denies HI/AH/VH   -This is a 23 y.o. female who presents for a psychiatric evaluation. She recently had a child, but the child passed away. She went to her regular PPD check up at her OB and admitted to "irrational things" due to being emotional. She had admitted to them that she was suicidal and has been sent here to be evaluated further. She denies any SI in the ED, and denies any plan to hurt herself. She denies any AH/VH/HI. She denies any history of smoking, drinking, or drug use. Additionally, her boyfriend who is here with her voices that he has no concerns for her wellbeing at this time.   -Pt arrives to clinic with c/o depression. Patient reports recently having and infant lost, reports having PPD, was seen earlier with OB specialty and reports being told to come here. Patient denies any active SI/HI at this time. Reports just wanting to speak to a psychiatrist.   -On interview, patient reports "I got emotional at OB appointment... I told them stuff about my son passing 1.5 weeks ago from SIDS. She asked me if I am having irrational thoughts..I told her yeah..  " She reports she would have memories of her and her son together. "It makes me emotional and overwhelmed but I would not hurt myself"  Denied having any thoughts of killing herself. Reports she is sleeping normally.   Eating less for past week.  Reports she is feeling sad but hedonia intact  Denied hopelessness  Reports she has family to live for  No SI or HI  Denied hx of depression, does report feeling sad at times.  Did not want any psychotropics medications  Denied panic attacks  Denied anxiety  Denied psychotic sx  This is the extent of patients complaints at this time  12 pt ros was negative aside from sx noted above  I spoke with patients SO at [phone]. He was there with her at the appointment today and has been with her ATC since baby passed. He reports " ""every other day she tells me it was her fault.. I tell her it wasn't and it does seem to calm her.  She is feeling some guilt about.. I have been watching her. 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 He acknowledged she is sad at times but not severely depressed, tending to ADLs and IADLs. No psychotic sx noted. He reports she is sleeping.  -Pt is 22yo PEC in with post-partum depression.  Had told her OB today that she was having SI.  Was instructed by OB to go to ER.  However, pt did not and the  ended up bringing pt to ER.  Pt does not want to be admitted.  Pt is medically cleared.   - Pt cooperative and pleasant.  Neat/clean.   Pt rights and expectations reviewed and handouts provided.  Pt signed all paperwork.  Pt minimizes reason for admit.  States "just miscommunication between me and my OB doctor."  Denies SI/HI/hallucinations.  Verbal contract for safety.  Rates depression 0/10 and anxiety 3/10.  Denies hx inpt or o/p psych.  Has been grieving death of child and wants to go home to plan burial.  Boyfriend called unit telling RN that they are needing to call people to invite for the burial and that she is just grieving, not suicidal.  Lives at home with mother, sister and boyfriend.  Admits to smoking 1 blunt THC per day.  Is currently on disability.  Appetite fair and sleep good.         The patient was medically cleared and admitted to the [facility].     Her son  at 3 weeks old. She has been having grief. She reports that she had been holding in her grief and "had a moment" when she saw her Ob. She feels that her symptoms were triggered by the environment having to discuss what happened.      She maintains that she is not suicidal. "I had told her that I had a suicidal thought when it first " "happened. She must have thought that I had meant that I was having current thoughts. I am not. I am not suicidal."     She is now denying all symptoms as documented below; there is some concern over her minimizing symptoms to secure discharge. She is concerned about being  home in time to attend to the  of her  child.      She notes positive social support. She reports willingness to attend outpatient treatment as soon as possible. She cites her family and friends as the primary reason she would not ever kill herself, "I wouldn't;t hurt them like that." She readily dicussed her future short and long term goals.          Symptoms of Depression: diminished mood - No, loss of interest/anhedonia - No;  recurrent - No, >14 days - No, diminished energy - No, change in sleep - No, change in appetite - No, diminished concentration or cognition or indecisiveness - No, PMA/R -  No, excessive guilt or hopelessness or worthlessness - No, suicidal ideations - No     Changes in Sleep: trouble with initiation- No, maintenance, - No early morning awakening with inability to return to sleep - No, hypersomnolence - No     Suicidal- active/passive ideations - No, organized plans, future intentions - No     Homicidal ideations: active/passive ideations - No, organized plans, future intentions - No     Symptoms of psychosis: hallucinations - No, delusions - No, disorganized speech - No, disorganized behavior or abnormal motor behavior - No, or negative symptoms (diminshed emotional expression, avolition, anhedonia, alogia, asociality) - No, active phase symptoms >1 month - No, continuous signs of illness > 6 months - No, since onset of illness decreased level of functioning present - No     Symptoms of trinity or hypomania: elevated, expansive, or irritable mood with increased energy or activity - No; > 4 days - No,  >7 days - No; with inflated self-esteem or grandiosity - No, decreased need for sleep - No, increased rate " "of speech - No, FOI or racing thoughts - No, distractibility - No, increased goal directed activity or PMA - No, risky/disinhibited behavior - No     Symptoms of TRAY: excessive anxiety/worry/fear, more days than not, about numerous issues - No, ongoing for >6 months - No, difficult to control - No, with restlessness - No, fatigue - No, poor concentration - No, irritability - No, muscle tension - No, sleep disturbance - No; causes functionally impairing distress - No     Symptoms of Panic Disorder: recurrent panic attacks (palpitations/heart racing, sweating, shakiness, dyspnea, choking, chest pain/discomfort, Gi symptoms, dizzy/lightheadedness, hot/col flashes, paresthesias, derealization, fear of losing control or fear of dying or fear of "going crazy") - No, precipitated - No, un-precipitated - No, source of worry and/or behavioral changes secondary for 1 month or longer- No, agoraphobia - No     Symptoms of PTSD: h/o trauma exposure - No; re-experiencing/intrusive symptoms - No, avoidant behavior - No, 2 or more negative alterations in cognition or mood - No, 2 or more hyperarousal symptoms - No; with dissociative symptoms - No, ongoing for 1 or more  months - No     Symptoms of OCD: obsessions (recurrent thoughts/urges/images; intrusive and/or unwanted; uses other thoughts/actions to suppress) - No; compulsions (repetitive behaviors used to lower distress/anxiety/obsessions) - No, time-consuming (over 1 hour per day) or cause significant distress/impairment - - No     Symptoms of Anorexia: restriction of caloric intake leading to significantly low body weight - No, intense fear of gaining weight or persistent behavior that interferes with weight gain even thought at a significantly low weight - No, disturbance in the way in which one's body weight or shape is experienced, undue influence of body weight or shape on self evaluation, or persistent lack of recognition of the seriousness of the current low body " weight - No     Symptoms of Bulimia: recurrent episodes of binge eating (definitely larger amount  than what others would eat and lack of a sense of control over eating during episode) - No, recurrent inappropriate compensatory behaviors in order to prevent weight gain (fasting, medications, exercise, vomiting) - No, binges and compensatory behaviors both occur on average at least once a week for 3 months - No, self evaluations is unduly influenced by body shape/weight- - No     Symptoms of Binge eating: recurrent episodes of binge eating (definitely larger amount than what others would eat and lack of a sense of control over eating during episode) - No, 3 or more of following (eating much more rapidly, eating until uncomfortably full, large amounts when not hungry, eating alone because of embarrassed by how much,  feeling disgusted with oneself, depressed or very guilty afterward) - No, distress regarding binges - No, binges occur on average at least once a week for 3 months - No        Substance/s:  Taken in larger amounts or over longer periods than intended: No,  Persistent desire or unsuccessful attempts to cut down or stop: No,  Great deal of time spent seeking, using or recovering from: No,  Craving or strong desire to use: No,  Recurrent use despite failure to meet major role obligation: No,  Continued use despite persistent or recurrent social/interparsonal issues due to use: No,  Important social/work/recreational activities given up due to use: No,  Recurrent use in physically hazardous situations: No,  Continued use despite knowledge of persistent physical or psychological problem: No,  Tolerance (either increased need or diminished effect): No,        Psychotherapy:  Target symptoms: depression, anxiety , substance abuse  Why chosen therapy is appropriate versus another modality: relevant to diagnosis, patient responds to this modality, evidence based practice  Outcome monitoring methods: self-report,  observation  Therapeutic intervention type: insight oriented psychotherapy, behavior modifying psychotherapy, supportive psychotherapy, interactive psychotherapy  Topics discussed/themes: building skills sets for symptom management, symptom recognition  The patient's response to the intervention is accepting. The patient's progress toward treatment goals is fair.   Duration of intervention: 16 minutes.        PAST PSYCHIATRIC HISTORY  Previous Psychiatric Hospitalizations: No  Previous SI/HI: No,  Previous Suicide Attempts: No,   Previous Medication Trials: No,  Psychiatric Care (current & past): No,  History of Psychotherapy: No,  History of Violence: No,  History of sexual/physical abuse: No,     PAST MEDICAL & SURGICAL HISTORY   History reviewed. No pertinent past medical history.  No past surgical history on file.  none     CURRENT PSYCH MEDICATION REGIMEN   none  Current Medication side effects:  n/a  Current Medication compliance:  n.a     Previous psych meds trials  none     Home Meds:           Prior to Admission medications    Medication Sig Start Date End Date Taking? Authorizing Provider   metroNIDAZOLE (FLAGYL) 500 MG tablet Take 1 tablet (500 mg total) by mouth every 12 (twelve) hours. for 7 days 12/5/23 12/12/23 Yes Rock Briggs MD   docusate sodium (COLACE) 100 MG capsule Take 2 capsules (200 mg total) by mouth 2 (two) times daily as needed for Constipation. 10/30/23     Anne Almodovar CNM   ferrous sulfate 325 (65 FE) MG EC tablet Take 1 tablet (325 mg total) by mouth 3 (three) times daily with meals. 8/14/23     Elvira London CNM   PNV,calcium 72/iron/folic acid (PRENATAL VITAMIN) Tab Take 1 tablet by mouth once daily. 8/14/23 8/13/24   Elvira London CNM            OTC Meds: none     Scheduled Meds:    PRN Meds: [START ON 12/7/2023] hydrOXYzine HCL, [START ON 12/7/2023] nicotine, OLANZapine **AND** OLANZapine   Psychotherapeutics (From admission, onward)        Start     Stop Route Frequency  Ordered     23 020   OLANZapine tablet 10 mg  (Olanzapine PRN (> 64 yo))        See Hyperspace for full Linked Orders Report.    -- Oral Every 8 hours PRN 23 0208     23 020   OLANZapine injection 10 mg  (Olanzapine PRN (> 64 yo))        See Hyperspace for full Linked Orders Report.    -- IM Every 8 hours PRN 23 0208                ALLERGIES   Review of patient's allergies indicates:  No Known Allergies     NEUROLOGIC HISTORY  Seizures: No  Head trauma: No     SOCIAL HISTORY:  Developmental/Childhood:Achieved all developmental milestones timely  Education:High School Diploma  Employment Status/Finances: off- work- usually works as a  - on maternity leave  Relationship Status/Sexual Orientation: in a committed relationship  Children: 1-   Housing Status: Home with mother and sister   history:  NO   Access to Firearms: NO ;  Locked up? n/a  Tenriism:Spiritual without formal affiliation  Recreational activities: walking, music, drawing     SUBSTANCE ABUSE HISTORY   Recreational Drugs: marijuana   Use of Alcohol: denied  Rehab History:no   Tobacco Use:no     LEGAL HISTORY:   Past charges/incarcerations: NO  Pending charges:NO     FAMILY PSYCHIATRIC HISTORY         Family History   Problem Relation Age of Onset    Breast cancer Neg Hx      Colon cancer Neg Hx      Ovarian cancer Neg Hx           denied         ROS   General ROS: negative  Ophthalmic ROS: negative  ENT ROS: negative  Allergy and Immunology ROS: negative  Hematological and Lymphatic ROS: negative  Endocrine ROS: negative  Respiratory ROS: no cough, shortness of breath, or wheezing  Cardiovascular ROS: no chest pain or dyspnea on exertion  Gastrointestinal ROS: no abdominal pain, change in bowel habits, or black or bloody stools  Genito-Urinary ROS: no dysuria, trouble voiding, or hematuria  Musculoskeletal ROS: negative  Neurological ROS: no TIA or stroke symptoms  Dermatological ROS: negative            EXAMINATION     PHYSICAL EXAM  Reviewed note/exam by Dr. Worrell from 12/5/23 at 7:04 PM; Fm consulted to assist with resources- pending      VITALS       Vitals:     12/06/23 0728   BP: (!) 114/56   Pulse: 68   Resp: 18   Temp: 97.5 °F (36.4 °C)         Body mass index is 29.5 kg/m².           PAIN  0/10  Subjective report of pain matches objective signs and symptoms: Yes     LABORATORY DATA   Recent Results         Recent Results (from the past 72 hour(s))   Comprehensive metabolic panel     Collection Time: 12/05/23  7:08 PM   Result Value Ref Range     Sodium 140 136 - 145 mmol/L     Potassium 3.6 3.5 - 5.1 mmol/L     Chloride 109 95 - 110 mmol/L     CO2 25 23 - 29 mmol/L     Glucose 99 70 - 110 mg/dL     BUN 8 6 - 20 mg/dL     Creatinine 0.8 0.5 - 1.4 mg/dL     Calcium 9.2 8.7 - 10.5 mg/dL     Total Protein 6.9 6.0 - 8.4 g/dL     Albumin 3.7 3.5 - 5.2 g/dL     Total Bilirubin 0.5 0.1 - 1.0 mg/dL     Alkaline Phosphatase 110 55 - 135 U/L     AST 21 10 - 40 U/L     ALT 18 10 - 44 U/L     eGFR >60 >60 mL/min/1.73 m^2     Anion Gap 6 (L) 8 - 16 mmol/L   Ethanol     Collection Time: 12/05/23  7:08 PM   Result Value Ref Range     Alcohol, Serum <10 <10 mg/dL   CBC auto differential     Collection Time: 12/05/23  7:17 PM   Result Value Ref Range     WBC 5.22 3.90 - 12.70 K/uL     RBC 4.68 4.00 - 5.40 M/uL     Hemoglobin 13.2 12.0 - 16.0 g/dL     Hematocrit 37.7 37.0 - 48.5 %     MCV 81 (L) 82 - 98 fL     MCH 28.2 27.0 - 31.0 pg     MCHC 35.0 32.0 - 36.0 g/dL     RDW 12.1 11.5 - 14.5 %     Platelets 250 150 - 450 K/uL     MPV 10.1 9.2 - 12.9 fL     Immature Granulocytes 0.2 0.0 - 0.5 %     Gran # (ANC) 2.3 1.8 - 7.7 K/uL     Immature Grans (Abs) 0.01 0.00 - 0.04 K/uL     Lymph # 2.5 1.0 - 4.8 K/uL     Mono # 0.4 0.3 - 1.0 K/uL     Eos # 0.1 0.0 - 0.5 K/uL     Baso # 0.03 0.00 - 0.20 K/uL     nRBC 0 0 /100 WBC     Gran % 43.1 38.0 - 73.0 %     Lymph % 47.3 18.0 - 48.0 %     Mono % 7.7 4.0 - 15.0 %     Eosinophil % 1.1  "0.0 - 8.0 %     Basophil % 0.6 0.0 - 1.9 %     Differential Method Automated     Urinalysis, Reflex to Urine Culture Urine, Clean Catch     Collection Time: 12/05/23  8:07 PM     Specimen: Urine   Result Value Ref Range     Specimen UA Urine, Clean Catch       Color, UA Yellow Yellow, Straw, Terese     Appearance, UA Hazy (A) Clear     pH, UA 7.0 5.0 - 8.0     Specific Gravity, UA 1.015 1.005 - 1.030     Protein, UA Negative Negative     Glucose, UA Negative Negative     Ketones, UA Negative Negative     Bilirubin (UA) Negative Negative     Occult Blood UA 1+ (A) Negative     Nitrite, UA Negative Negative     Urobilinogen, UA Negative <2.0 EU/dL     Leukocytes, UA 3+ (A) Negative   Drug screen panel, emergency     Collection Time: 12/05/23  8:07 PM   Result Value Ref Range     Benzodiazepines Negative Negatiive     Methadone metabolites Negative Negative     Cocaine (Metab.) Negative Negative     Opiate Scrn, Ur Negative Negative     Barbiturate Screen, Ur Negative Negative     Amphetamine Screen, Ur Negative Negative     THC Presumptive Positive (A) Negative     Phencyclidine Negative Negative     Creatinine, Urine 138.8 15.0 - 325.0 mg/dL     Toxicology Information SEE COMMENT     Urinalysis Microscopic     Collection Time: 12/05/23  8:07 PM   Result Value Ref Range     RBC, UA 8 (H) 0 - 4 /hpf     WBC, UA 17 (H) 0 - 5 /hpf     Bacteria Occasional None-Occ /hpf     Squam Epithel, UA 10 /hpf     Trichomonas, UA Occasional (A) None     Microscopic Comment SEE COMMENT     POCT urine pregnancy     Collection Time: 12/05/23 10:42 PM   Result Value Ref Range     POC Preg Test, Ur Negative Negative      Acceptable Yes           No results found for: "PHENYTOIN", "PHENOBARB", "VALPROATE", "CBMZ"           CONSTITUTIONAL  General Appearance: unremarkable, age appropriate     MUSCULOSKELETAL  Muscle Strength and Tone:no dyskinesia, no dystonia, no tremor, no tic  Abnormal Involuntary Movements: No  Gait and " Station: non-ataxic     PSYCHIATRIC   Level of Consciousness: awake and alert   Orientation: person, place, time, and situation  Grooming: Hospital garb and Well groomed  Psychomotor Behavior: normal, cooperative, friendly and cooperative, eye contact normal  Speech: normal tone, normal rate, normal pitch, normal volume, spontaneous  Language: grossly intact, able to name, able to repeat  Mood: anxious and depressed  Affect: Anxious and Full  Thought Process: linear, logical  Associations: intact   Thought Content: denies SI, denies HI, and no delusions  Perceptions: denies AH and denies  VH  Memory: Able to recall past events, Remote intact, and Recent intact  Attention:Normal and Attends to interview without distraction  Fund of Knowledge: Aware of current events and Vocabulary appropriate   Estimate if Intelligence:  Average based on work/education history, vocabulary and mental status exam  Insight: intact, has awareness of illness- fair  Judgment: behavior is adequate to circumstances, age appropriate- fair        PSYCHOSOCIAL     PSYCHOSOCIAL STRESSORS   health     FUNCTIONING RELATIONSHIPS   good support system     STRENGTHS AND LIABILITIES   Strength: Patient accepts guidance/feedback, Strength: Patient is expressive/articulate., Liability: Patient lacks social skills., Liability: Patient lacks coping skills.     Is the patient aware of the biomedical complications associated with substance abuse and mental illness? yes     Does the patient have an Advance Directive for Mental Health treatment? no  (If yes, inform patient to bring copy.)           MEDICAL DECISION MAKING          ASSESSMENT         MDD, single episode,severe without psychotic features  R/o PPD; r/o Adjustment Disorder  Bereavement      Cannabis abuse     Psychosocial stressors        PROBLEM LIST AND MANAGEMENT PLANS     Depression: pt counseled  -Pt declined pharmacotherapy   -she is open to psychotherapy- psychotherapy provided  today  -consider trial of zoloft in the future if needed     Grief: pt counseled  -SW consulted to assist with resources  -refer for grief therapy and support groups      Cannabis abuse: pt counseled      Psychosocial stressors: pt counseled  -SW consulted to assist with resources    PRESCRIPTION DRUG MANAGEMENT  Compliance: yes  Side Effects: no  Regimen Adjustments: see above     Discussed diagnosis, risks and benefits of proposed treatment vs alternative treatments vs no treatment, potential side effects of these treatments and the inherent unpredictability of treatment. The patient expresses understanding of the above and displays the capacity to agree with this treatment given said understanding. Patient also agrees that, currently, the benefits outweigh the risks and would like to pursue/continue treatment at this time.     Any medications being used off-label were discussed with the patient inclusive of the evidence base for the use of the medications and consent was obtained for the off-label use of the medication.            DIAGNOSTIC TESTING  Labs reviewed with patient; follow up pending labs     Disposition:  -Will attempt to obtain outside psychiatric records if available  -SW to assist with aftercare planning and collateral  -Once stable discharge home with outpatient follow up care and/or IOP  -Continue inpatient treatment under a PEC and/or CEC for danger to self/ danger to others/grave disability as evident by danger to self and suicidal ideation     The patient will likely be stable for discharge home tomorrow if stability persist and pending collateral confirmation of safety. Prolonged hospitalization will likely cause more harm than good, especially if she misses her  child's .      Present biopsychosocial functioning:   Pt is a 23 year old female with chief complaints of depression and SI. Pt lives with her mother, sister and boyfriend.  Admits to smoking 1 blunt THC per day.  Is  currently on disability.Pt is currently working at [employer] as a .      Past biopsychosocial functioning:   Pt has no past history on file. Pt is grieving the lost of her son and has no past psychiatric history of depression nor SI.    Family and Marital/Relationship History:     Significant Other/Partner Relationships:  Partnered: Relationship intact    Family Relationships: Intact      Childhood History:     Where was patient raised?  [place], [place]    Who raised the patient?  Biological father      How does patient describe their childhood?   Pt states fine      Who is patient's primary support person?  Freeman New/boyfriend      Culture and Mormonism:     Mormonism: No Mormonism    How strong of a role does Pentecostal and spirituality play in patient's life?   Spiritual without formal affiliations.     Latter day or spiritual concerns regarding treatment: not applicable     History of Abuse:   History of Abuse: Denies      Outcome: n/a    Psychiatric and Medical History:     History of psychiatric illness or treatment: has participated in counseling/psychotherapy on an outpatient basis in the past    Medical history: History reviewed. No pertinent past medical history.    Substance Abuse History:     Alcohol - (Patient Perspective):   Social History     Substance and Sexual Activity   Alcohol Use Not Currently       Alcohol - (Collateral Perspective):    Freeman jeff pt drinks alcohol on special occassions  Drugs - (Patient Perspective):   Social History     Substance and Sexual Activity   Drug Use Yes    Types: Marijuana       Drugs - (Collateral Perspective):    Freeman states pt does not endorse in drug use.     Additional Comments: Pt UDS upon admit was negative.     Education:     Currently Enrolled? No  High School (9-12) or GED    Special Education? No    Interested in Completing Education/GED: No    Employment and Financial:     Currently employed? Employed: Current Occupation:  [employer] as a  for 1  year    Source of Income: salary    Able to afford basic needs (food, shelter, utilities)? Yes    Who manages finances/personal affairs?  self      Service:     ? no    Combat Service? No     Community Resources:     Describe present use of community resources: [facility]     Identify previously used community resources   (Include previous mental health treatment - outpatient and inpatient):  [facility]    Environmental:     Current living situation:Lives with family    Social Evaluation:     Patient Assets: Average or above intelligence, Work skills, Ability for insight, Communicable skills, and Financial means    Patient Limitations: none    High risk psychosocial issues that may impact discharge planning:   depression/SI     Recommendations:     Anticipated discharge plan:   outpatient follow up: [facility]    High risk issues requiring early treatment planning and immediate intervention:   depression/SI     Community resources needed for discharge planning:  aftercare treatment sources    Anticipated social work role(s) in treatment and discharge planning:   [facility] will engage pt in treatment and encourage participation in group therapy. Safety plan to be facilitated and encouraged. [facility] will aid in discharge planning.

## 2023-12-06 NOTE — NURSING
"Admit assessment complete.   Skin assessment done by female ER RN.  Small bug bites to lower legs.  Pt cooperative and pleasant.  Neat/clean.   Pt rights and expectations reviewed and handouts provided.  Pt signed all paperwork.  Pt minimizes reason for admit.  States "just miscommunication between me and my OB doctor."  Denies SI/HI/hallucinations.  Verbal contract for safety.  Rates depression 0/10 and anxiety 3/10.  Denies hx inpt or o/p psych.  Has been grieving death of child and wants to go home to plan burial.  Boyfriend called unit telling RN that they are needing to call people to invite for the burial and that she is just grieving, not suicidal.  Lives at home with mother, sister and boyfriend.  Admits to smoking 1 blunt THC per day.  Is currently on disability.  Appetite fair and sleep good.  No distress noted.  No complaints voiced.  Water pitcher and food provided.  Allowed to use phone.  Shown to room.    "

## 2023-12-06 NOTE — PSYCH
Arrival Time:  1125    Hemodynamics:      PAP (mmHg): (19-45)/(10-36) 26/13  CVP:  [5 mmHg-17 mmHg] 8 mmHg  CO:  [4 l/min-8.1 l/min] 7.3 l/min  CI:  [1.8 l/min/m2-3.6 l/min/m2] 3.2 l/min/m2    SVO2:  69    Vitals with min/max:    Vital Last Value 24 Hour Range   Temperature 97.2 °F (36.2 °C) (06/10/20 2300) Temp  Min: 97.2 °F (36.2 °C)  Max: 98.6 °F (37 °C)   Pulse 85 (06/10/20 2300) Pulse  Min: 74  Max: 94   Respiratory 15 (06/10/20 2300) Resp  Min: 10  Max: 37   Non-Invasive  Blood Pressure (!) 143/78 (06/10/20 0209) BP  Min: 143/78  Max: 143/78   Pulse Oximetry 98 % (06/10/20 2300) SpO2  Min: 93 %  Max: 99 %   Arterial   Blood Pressure 121/57 (06/10/20 2300) Arterial Line BP  Min: 81/53  Max: 189/189     Labs Supplemented:   Potassium- 4.3 after 20 mEq  Calcium- 1.19 , 1 g given  Mag-2.2 , bedtime dose 2 g given    1L Albumin given for hypotension and low CVP.  1 L LR given also    [place] at 57.    Lines:   R Subclavian- CVC/[place]  L PIV x 2  L Brach Art Line    Cardiac:   A-Paced 86 bpm  Intrinsic: NSR with PCV/PACs 76 bpm  Pulses: +2/+2, mild gen edema    Drips:    Fenoldapam off  Insulin CV6  D545 @ 20 mL/h  D545 + 40 k @ 70 mL/h    Pulmonary:    10 L Oxymask  SvO2- 60-70%  Extubated @ 1330   Sounds diminished    Chest Tube:    L Pleural: -20 suction ([place])  Total : 390 cc out    Med x 2: -20 suction ([place]ang)  Total: 550 cc out    Neuro:   Intact, follows commands  Moves all extremities    GI:    Sips and chip d/t not passing gas  Hypoactive BS  Passed dysphasia screen    :    [place]  Total Out: 945 cc    Activity:    Dangled and stood  Chair    Pain:  Controlled with [place] and Toradol.    Incisions & Skin: Midline incision, NO EVH    Family:  Sister Yaz [phone]   [facility] discussed with pt about collateral to contact boyfriend Freeman New  Ihsan Lyon [phone]. Pt signed collateral to release information to . [facility] contacted  and spoke to Ms. Lennon()  to discuss what information  would need to push back court date. MsJeri Lennon states information needed is the name of the hospital, address and phone number of facility that girlfriend is in and they could submit it to the courts stating that he has to handle the  arrangements while girlfriend is in the hospital and she would need the discharge date as well. And [facility] could fax information to [phone]. Ms. Lennon states this does not guarantee that the court dates will be pushed back, but all they could do was submit information. [facility] faxed information at 2:35 pm.

## 2023-12-06 NOTE — CONSULTS
"[facility]  Psychiatry  Telepsychiatry Consult Note    Please see previous notes:    Patient agreeable to consultation via telepsychiatry.    Tele-Consultation from Psychiatry started: 12/5/2023 at 8:49pm  The chief complaint leading to psychiatric consultation is: Islam  This consultation was requested by Dr Worrell, the Emergency Department attending physician.  The location of the consulting psychiatrist is  [place] .  The patient location is  [facility] EMERGENCY DEPARTMENT   The patient arrived at the ED at: [facility]    Also present with the patient at the time of the consultation: none    Patient Identification:   Antonio Holt is a 23 y.o. female.    Patient information was obtained from patient and past medical records.  Patient presented voluntarily to the Emergency Department     Consults  Teleconsult Time Documentation  Subjective:     History of Present Illness:  22yo F with hx of   presents with possible SI.    Per ED note-  "    Psychiatric Evaluation        Pt presents to the ED after telling OB nurse she has thoughts of harming self. Pt had baby on 11/28 and has suffered with PPD since. Denies past SI attempts. Denies HI/AH/VH.       Time seen by provider: 7:06 PM     This is a 23 y.o. female who presents for a psychiatric evaluation. She recently had a child, but the child passed away. She went to her regular PPD check up at her OB and admitted to "irrational things" due to being emotional. She had admitted to them that she was suicidal and has been sent here to be evaluated further. She denies any SI in the ED, and denies any plan to hurt herself. She denies any AH/VH/HI. She denies any history of smoking, drinking, or drug use. Additionally, her boyfriend who is here with her voices that he has no concerns for her wellbeing at this time.     Patient denies any other complaints at this time.   "    Per OB note from earlier today  -  " N  Postpartum depression: 21. Patient tearful in examination " "room. Reports infant recently passed. She has active thoughts of suicidal ideation, including crashing her vehicle, drowning in tub, and overdose. Patient last had thoughts like this yesterday. She also reports frequent flashbacks of incident which triggers suicidal ideation. States no guns at home. Feels well supported by her partner and mother.  Incision: N/A  Contraception: None, desires future pregnancy"    On interview, patient reports "I got emotional at OB appointment... I told them stuff about my son passing 1.5 weeks ago from SIDS. She asked me if I am having irrational thoughts..I told her yeah..  " She reports she would have memories of her and her son together. "It makes me emotional and overwhelmed but I would not hurt myself"  Denied having any thoughts of killing herself. Reports she is sleeping normally.   Eating less for past week.  Reports she is feeling sad but hedonia intact  Denied hopelessness  Reports she has family to live for  No SI or HI  Denied hx of depression, does report feeling sad at times.  Did not want any psychotropics medications  Denied panic attacks  Denied anxiety  Denied psychotic sx  This is the extent of patients complaints at this time  12 pt ros was negative aside from sx noted above  I spoke with patients SO at [phone]. He was there with her at the appointment today and has been with her ATC since baby passed. He reports "every other day she tells me it was her fault.. I tell her it wasn't and it does seem to calm her.  She is feeling some guilt about.. I have been watching her. 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 He acknowledged she is sad at times but not severely depressed, tending to ADLs and IADLs. No psychotic sx noted. He reports she is " "sleeping.      Psychiatric History:   Previous Psychiatric Hospitalizations: No   Previous Medication Trials: No   Previous Suicide Attempts: no   History of Violence: no  History of Depression: no  History of Heather: no  History of Auditory/Visual Hallucination no  History of Delusions: no  Outpatient psychiatrist (current & past): No    Substance Abuse History:  Tobacco:No  Alcohol: No  Illicit Substances:Yes, MJ  Detox/Rehab: No    Legal History: Past charges/incarcerations: No     Family Psychiatric History: denied      Social History:  Lives with mother and sister. Babies father supportive as well.   Off work right now- normally works as a   Denied access to firearms.      Psychiatric Mental Status Exam:  Arousal: alert  Sensorium/Orientation: oriented to grossly intact  Behavior/Cooperation: cooperative   Speech: normal tone, normal rate, normal pitch, normal volume  Language: grossly intact  Mood: " sad "   Affect: constricted  Thought Process: normal and logical  Thought Content:   Auditory hallucinations: NO  Visual hallucinations: NO  Paranoia: NO  Delusions:  NO  Suicidal ideation: NO  Homicidal ideation: NO  Attention/Concentration:  intact  Memory:    Recent:  Intact   Remote: Intact     Fund of Knowledge: Aware of current events   Abstract reasoning: similarities were abstract  Insight: has awareness of illness  Judgment: behavior is adequate to circumstances      Past Medical History: No past medical history on file.   Laboratory Data:   Labs Reviewed   CBC W/ AUTO DIFFERENTIAL - Abnormal; Notable for the following components:       Result Value    MCV 81 (*)     All other components within normal limits   COMPREHENSIVE METABOLIC PANEL - Abnormal; Notable for the following components:    Anion Gap 6 (*)     All other components within normal limits   URINALYSIS, REFLEX TO URINE CULTURE - Abnormal; Notable for the following components:    Appearance, UA Hazy (*)     Occult Blood UA 1+ (*)     " Leukocytes, UA 3+ (*)     All other components within normal limits    Narrative:     Specimen Source->Urine   DRUG SCREEN PANEL, URINE EMERGENCY - Abnormal; Notable for the following components:    THC Presumptive Positive (*)     All other components within normal limits    Narrative:     Specimen Source->Urine   URINALYSIS MICROSCOPIC - Abnormal; Notable for the following components:    RBC, UA 8 (*)     WBC, UA 17 (*)     Trichomonas, UA Occasional (*)     All other components within normal limits    Narrative:     Specimen Source->Urine   CULTURE, URINE   ALCOHOL,MEDICAL (ETHANOL)     Allergies:   Review of patient's allergies indicates:  No Known Allergies    Medications in ER: Medications - No data to display    Medications at home: reviewed with hx and in MAR    No new subjective & objective note has been filed under this hospital service since the last note was generated.      Assessment - Diagnosis - Goals:     Diagnosis/Impression:   Depressive disorder, unspecified  R/o MDD    Rec:   After extensive discussion with primary team and reviewing OB note, recommend PEC for risk of harm to self.  Inpatient psychiatric tx once medically cleared.  Ativan 2mg IV/IM q 2hours prn severe agitation  Will defer to inpatient psychiatric team to start/modify scheduled medications.      Time with patient, coordinating care: 52min      More than 50% of the time was spent counseling/coordinating care    Consulting clinician was informed of the encounter and consult note.    Consultation ended: 12/5/2023 at 9:58pm    Erwin Mcarthur MD  Psychiatry  [facility]

## 2023-12-06 NOTE — PROGRESS NOTES
"   12/06/23 1430   [facility] Group Therapy   Group Name Leisure Education   Specific Interventions Leisure Counseling   Participation Level Sharing;Appropriate   Participation Quality Cooperative   Insight/Motivation Good   Affect/Mood Display Depressed;Anxious   Cognition Alert   Psychomotor WNL     Patient presents depressed, anxious, focused on being discharged, reports "fine" mood. Patient shared with the CTRS, that she feels like she went through the grieving process, then states being at her OB doctor's check-up triggered her to think about the loss of he child, "I got emotional and started crying." Patient shared she prefer social activities to socialize with others and not feel alone.  "

## 2023-12-06 NOTE — PLAN OF CARE
Pt is sleeping at this time and has slept 1.5 hours uninterrupted.  Pt late admit.  NAD.  Resp even & unlabored.  Pathways clear.  Q 15 minute safety checks ongoing.  All precautions maintained

## 2023-12-06 NOTE — H&P
"PSYCHIATRY INPATIENT ADMISSION NOTE - H & P      12/6/2023 10:45 AM   Antonio Holt   2000   [id number]         DATE OF ADMISSION: 12/6/2023  1:36 AM    SITE: [facility]    CURRENT LEGAL STATUS: PEC and/or CEC      HISTORY    CHIEF COMPLAINT   Antonio Holt is a 23 y.o. female with no past psychiatric history of  urrently admitted to the inpatient unit with the following chief complaint: depression/SI, "I had a moment."     HPI   The patient was seen and examined. The chart was reviewed.    The patient presented to the ER on 12/6/2023 . Per staff notes:  -Pt presents to the ED after telling OB nurse she has thoughts of harming self. Pt had baby on 11/28 and has suffered with PPD since. Denies past SI attempts. Denies HI/AH/VH   -This is a 23 y.o. female who presents for a psychiatric evaluation. She recently had a child, but the child passed away. She went to her regular PPD check up at her OB and admitted to "irrational things" due to being emotional. She had admitted to them that she was suicidal and has been sent here to be evaluated further. She denies any SI in the ED, and denies any plan to hurt herself. She denies any AH/VH/HI. She denies any history of smoking, drinking, or drug use. Additionally, her boyfriend who is here with her voices that he has no concerns for her wellbeing at this time.   -Pt arrives to clinic with c/o depression. Patient reports recently having and infant lost, reports having PPD, was seen earlier with OB specialty and reports being told to come here. Patient denies any active SI/HI at this time. Reports just wanting to speak to a psychiatrist.   -On interview, patient reports "I got emotional at OB appointment... I told them stuff about my son passing 1.5 weeks ago from SIDS. She asked me if I am having irrational thoughts..I told her yeah..  " She reports she would have memories of her and her son together. "It makes me emotional and overwhelmed but I would not hurt " "myself"  Denied having any thoughts of killing herself. Reports she is sleeping normally.   Eating less for past week.  Reports she is feeling sad but hedonia intact  Denied hopelessness  Reports she has family to live for  No SI or HI  Denied hx of depression, does report feeling sad at times.  Did not want any psychotropics medications  Denied panic attacks  Denied anxiety  Denied psychotic sx  This is the extent of patients complaints at this time  12 pt ros was negative aside from sx noted above  I spoke with patients SO at [phone]. He was there with her at the appointment today and has been with her ATC since baby passed. He reports "every other day she tells me it was her fault.. I tell her it wasn't and it does seem to calm her.  She is feeling some guilt about.. I have been watching her. 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 He acknowledged she is sad at times but not severely depressed, tending to ADLs and IADLs. No psychotic sx noted. He reports she is sleeping.  -Pt is 22yo PEC in with post-partum depression.  Had told her OB today that she was having SI.  Was instructed by OB to go to ER.  However, pt did not and the  ended up bringing pt to ER.  Pt does not want to be admitted.  Pt is medically cleared.   - Pt cooperative and pleasant.  Neat/clean.   Pt rights and expectations reviewed and handouts provided.  Pt signed all paperwork.  Pt minimizes reason for admit.  States "just miscommunication between me and my OB doctor."  Denies SI/HI/hallucinations.  Verbal contract for safety.  Rates depression 0/10 and anxiety 3/10.  Denies hx inpt or o/p psych.  Has been grieving death of child and wants to go home to plan burial.  Boyfriend called unit telling RN that they are needing to " "call people to invite for the burial and that she is just grieving, not suicidal.  Lives at home with mother, sister and boyfriend.  Admits to smoking 1 blunt THC per day.  Is currently on disability.  Appetite fair and sleep good.       The patient was medically cleared and admitted to the [facility].    Her son  at 3 weeks old. She has been having grief. She reports that she had been holding in her grief and "had a moment" when she saw her Ob. She feels that her symptoms were triggered by the environment having to discuss what happened.     She maintains that she is not suicidal. "I had told her that I had a suicidal thought when it first happened. She must have thought that I had meant that I was having current thoughts. I am not. I am not suicidal."    She is now denying all symptoms as documented below; there is some concern over her minimizing symptoms to secure discharge. She is concerned about being  home in time to attend to the  of her  child.     She notes positive social support. She reports willingness to attend outpatient treatment as soon as possible. She cites her family and friends as the primary reason she would not ever kill herself, "I wouldn't;t hurt them like that." She readily dicussed her future short and long term goals.        Symptoms of Depression: diminished mood - No, loss of interest/anhedonia - No;  recurrent - No, >14 days - No, diminished energy - No, change in sleep - No, change in appetite - No, diminished concentration or cognition or indecisiveness - No, PMA/R -  No, excessive guilt or hopelessness or worthlessness - No, suicidal ideations - No    Changes in Sleep: trouble with initiation- No, maintenance, - No early morning awakening with inability to return to sleep - No, hypersomnolence - No    Suicidal- active/passive ideations - No, organized plans, future intentions - No    Homicidal ideations: active/passive ideations - No, organized plans, future intentions - " "No    Symptoms of psychosis: hallucinations - No, delusions - No, disorganized speech - No, disorganized behavior or abnormal motor behavior - No, or negative symptoms (diminshed emotional expression, avolition, anhedonia, alogia, asociality) - No, active phase symptoms >1 month - No, continuous signs of illness > 6 months - No, since onset of illness decreased level of functioning present - No    Symptoms of trinity or hypomania: elevated, expansive, or irritable mood with increased energy or activity - No; > 4 days - No,  >7 days - No; with inflated self-esteem or grandiosity - No, decreased need for sleep - No, increased rate of speech - No, FOI or racing thoughts - No, distractibility - No, increased goal directed activity or PMA - No, risky/disinhibited behavior - No    Symptoms of TRAY: excessive anxiety/worry/fear, more days than not, about numerous issues - No, ongoing for >6 months - No, difficult to control - No, with restlessness - No, fatigue - No, poor concentration - No, irritability - No, muscle tension - No, sleep disturbance - No; causes functionally impairing distress - No    Symptoms of Panic Disorder: recurrent panic attacks (palpitations/heart racing, sweating, shakiness, dyspnea, choking, chest pain/discomfort, Gi symptoms, dizzy/lightheadedness, hot/col flashes, paresthesias, derealization, fear of losing control or fear of dying or fear of "going crazy") - No, precipitated - No, un-precipitated - No, source of worry and/or behavioral changes secondary for 1 month or longer- No, agoraphobia - No    Symptoms of PTSD: h/o trauma exposure - No; re-experiencing/intrusive symptoms - No, avoidant behavior - No, 2 or more negative alterations in cognition or mood - No, 2 or more hyperarousal symptoms - No; with dissociative symptoms - No, ongoing for 1 or more  months - No    Symptoms of OCD: obsessions (recurrent thoughts/urges/images; intrusive and/or unwanted; uses other thoughts/actions to suppress) " - No; compulsions (repetitive behaviors used to lower distress/anxiety/obsessions) - No, time-consuming (over 1 hour per day) or cause significant distress/impairment - - No    Symptoms of Anorexia: restriction of caloric intake leading to significantly low body weight - No, intense fear of gaining weight or persistent behavior that interferes with weight gain even thought at a significantly low weight - No, disturbance in the way in which one's body weight or shape is experienced, undue influence of body weight or shape on self evaluation, or persistent lack of recognition of the seriousness of the current low body weight - No    Symptoms of Bulimia: recurrent episodes of binge eating (definitely larger amount  than what others would eat and lack of a sense of control over eating during episode) - No, recurrent inappropriate compensatory behaviors in order to prevent weight gain (fasting, medications, exercise, vomiting) - No, binges and compensatory behaviors both occur on average at least once a week for 3 months - No, self evaluations is unduly influenced by body shape/weight- - No    Symptoms of Binge eating: recurrent episodes of binge eating (definitely larger amount than what others would eat and lack of a sense of control over eating during episode) - No, 3 or more of following (eating much more rapidly, eating until uncomfortably full, large amounts when not hungry, eating alone because of embarrassed by how much,  feeling disgusted with oneself, depressed or very guilty afterward) - No, distress regarding binges - No, binges occur on average at least once a week for 3 months - No      Substance/s:  Taken in larger amounts or over longer periods than intended: No,  Persistent desire or unsuccessful attempts to cut down or stop: No,  Great deal of time spent seeking, using or recovering from: No,  Craving or strong desire to use: No,  Recurrent use despite failure to meet major role obligation:  No,  Continued use despite persistent or recurrent social/interparsonal issues due to use: No,  Important social/work/recreational activities given up due to use: No,  Recurrent use in physically hazardous situations: No,  Continued use despite knowledge of persistent physical or psychological problem: No,  Tolerance (either increased need or diminished effect): No,      Psychotherapy:  Target symptoms: depression, anxiety , substance abuse  Why chosen therapy is appropriate versus another modality: relevant to diagnosis, patient responds to this modality, evidence based practice  Outcome monitoring methods: self-report, observation  Therapeutic intervention type: insight oriented psychotherapy, behavior modifying psychotherapy, supportive psychotherapy, interactive psychotherapy  Topics discussed/themes: building skills sets for symptom management, symptom recognition  The patient's response to the intervention is accepting. The patient's progress toward treatment goals is fair.   Duration of intervention: 16 minutes.      PAST PSYCHIATRIC HISTORY  Previous Psychiatric Hospitalizations: No  Previous SI/HI: No,  Previous Suicide Attempts: No,   Previous Medication Trials: No,  Psychiatric Care (current & past): No,  History of Psychotherapy: No,  History of Violence: No,  History of sexual/physical abuse: No,    PAST MEDICAL & SURGICAL HISTORY   History reviewed. No pertinent past medical history.  No past surgical history on file.  none    CURRENT PSYCH MEDICATION REGIMEN   none  Current Medication side effects:  n/a  Current Medication compliance:  n.a    Previous psych meds trials  none    Home Meds:   Prior to Admission medications    Medication Sig Start Date End Date Taking? Authorizing Provider   metroNIDAZOLE (FLAGYL) 500 MG tablet Take 1 tablet (500 mg total) by mouth every 12 (twelve) hours. for 7 days 12/5/23 12/12/23 Yes Rock Briggs MD   docusate sodium (COLACE) 100 MG capsule Take 2 capsules (200  mg total) by mouth 2 (two) times daily as needed for Constipation. 10/30/23   Anne Almodovar CNM   ferrous sulfate 325 (65 FE) MG EC tablet Take 1 tablet (325 mg total) by mouth 3 (three) times daily with meals. 23   Elvira London CNM   PNV,calcium 72/iron/folic acid (PRENATAL VITAMIN) Tab Take 1 tablet by mouth once daily. 23  Elvira London CNM         OTC Meds: none    Scheduled Meds:    PRN Meds: [START ON 2023] hydrOXYzine HCL, [START ON 2023] nicotine, OLANZapine **AND** OLANZapine   Psychotherapeutics (From admission, onward)      Start     Stop Route Frequency Ordered    23  OLANZapine tablet 10 mg  (Olanzapine PRN (> 66 yo))        See Hyperspace for full Linked Orders Report.    -- Oral Every 8 hours PRN 23 0208    23  OLANZapine injection 10 mg  (Olanzapine PRN (> 66 yo))        See Hyperspace for full Linked Orders Report.    -- IM Every 8 hours PRN 23            ALLERGIES   Review of patient's allergies indicates:  No Known Allergies    NEUROLOGIC HISTORY  Seizures: No  Head trauma: No    SOCIAL HISTORY:  Developmental/Childhood:Achieved all developmental milestones timely  Education:High School Diploma  Employment Status/Finances: off- work- usually works as a  - on maternity leave  Relationship Status/Sexual Orientation: in a committed relationship  Children: 1-   Housing Status: Home with mother and sister   history:  NO   Access to Firearms: NO ;  Locked up? n/a  Yazdanism:Spiritual without formal affiliation  Recreational activities: walking, music, drawing    SUBSTANCE ABUSE HISTORY   Recreational Drugs: marijuana   Use of Alcohol: denied  Rehab History:no   Tobacco Use:no    LEGAL HISTORY:   Past charges/incarcerations: NO  Pending charges:NO    FAMILY PSYCHIATRIC HISTORY   Family History   Problem Relation Age of Onset    Breast cancer Neg Hx     Colon cancer Neg Hx     Ovarian cancer Neg Hx        denied        ROS   General ROS: negative  Ophthalmic ROS: negative  ENT ROS: negative  Allergy and Immunology ROS: negative  Hematological and Lymphatic ROS: negative  Endocrine ROS: negative  Respiratory ROS: no cough, shortness of breath, or wheezing  Cardiovascular ROS: no chest pain or dyspnea on exertion  Gastrointestinal ROS: no abdominal pain, change in bowel habits, or black or bloody stools  Genito-Urinary ROS: no dysuria, trouble voiding, or hematuria  Musculoskeletal ROS: negative  Neurological ROS: no TIA or stroke symptoms  Dermatological ROS: negative        EXAMINATION    PHYSICAL EXAM  Reviewed note/exam by Dr. Worrell from 12/5/23 at 7:04 PM; Fm consulted to assist with resources- pending     VITALS   Vitals:    12/06/23 0728   BP: (!) 114/56   Pulse: 68   Resp: 18   Temp: 97.5 °F (36.4 °C)        Body mass index is 29.5 kg/m².        PAIN  0/10  Subjective report of pain matches objective signs and symptoms: Yes    LABORATORY DATA   Recent Results (from the past 72 hour(s))   Comprehensive metabolic panel    Collection Time: 12/05/23  7:08 PM   Result Value Ref Range    Sodium 140 136 - 145 mmol/L    Potassium 3.6 3.5 - 5.1 mmol/L    Chloride 109 95 - 110 mmol/L    CO2 25 23 - 29 mmol/L    Glucose 99 70 - 110 mg/dL    BUN 8 6 - 20 mg/dL    Creatinine 0.8 0.5 - 1.4 mg/dL    Calcium 9.2 8.7 - 10.5 mg/dL    Total Protein 6.9 6.0 - 8.4 g/dL    Albumin 3.7 3.5 - 5.2 g/dL    Total Bilirubin 0.5 0.1 - 1.0 mg/dL    Alkaline Phosphatase 110 55 - 135 U/L    AST 21 10 - 40 U/L    ALT 18 10 - 44 U/L    eGFR >60 >60 mL/min/1.73 m^2    Anion Gap 6 (L) 8 - 16 mmol/L   Ethanol    Collection Time: 12/05/23  7:08 PM   Result Value Ref Range    Alcohol, Serum <10 <10 mg/dL   CBC auto differential    Collection Time: 12/05/23  7:17 PM   Result Value Ref Range    WBC 5.22 3.90 - 12.70 K/uL    RBC 4.68 4.00 - 5.40 M/uL    Hemoglobin 13.2 12.0 - 16.0 g/dL    Hematocrit 37.7 37.0 - 48.5 %    MCV 81 (L) 82 - 98 fL    MCH 28.2 27.0  - 31.0 pg    MCHC 35.0 32.0 - 36.0 g/dL    RDW 12.1 11.5 - 14.5 %    Platelets 250 150 - 450 K/uL    MPV 10.1 9.2 - 12.9 fL    Immature Granulocytes 0.2 0.0 - 0.5 %    Gran # (ANC) 2.3 1.8 - 7.7 K/uL    Immature Grans (Abs) 0.01 0.00 - 0.04 K/uL    Lymph # 2.5 1.0 - 4.8 K/uL    Mono # 0.4 0.3 - 1.0 K/uL    Eos # 0.1 0.0 - 0.5 K/uL    Baso # 0.03 0.00 - 0.20 K/uL    nRBC 0 0 /100 WBC    Gran % 43.1 38.0 - 73.0 %    Lymph % 47.3 18.0 - 48.0 %    Mono % 7.7 4.0 - 15.0 %    Eosinophil % 1.1 0.0 - 8.0 %    Basophil % 0.6 0.0 - 1.9 %    Differential Method Automated    Urinalysis, Reflex to Urine Culture Urine, Clean Catch    Collection Time: 12/05/23  8:07 PM    Specimen: Urine   Result Value Ref Range    Specimen UA Urine, Clean Catch     Color, UA Yellow Yellow, Straw, Terese    Appearance, UA Hazy (A) Clear    pH, UA 7.0 5.0 - 8.0    Specific Gravity, UA 1.015 1.005 - 1.030    Protein, UA Negative Negative    Glucose, UA Negative Negative    Ketones, UA Negative Negative    Bilirubin (UA) Negative Negative    Occult Blood UA 1+ (A) Negative    Nitrite, UA Negative Negative    Urobilinogen, UA Negative <2.0 EU/dL    Leukocytes, UA 3+ (A) Negative   Drug screen panel, emergency    Collection Time: 12/05/23  8:07 PM   Result Value Ref Range    Benzodiazepines Negative Negatiive    Methadone metabolites Negative Negative    Cocaine (Metab.) Negative Negative    Opiate Scrn, Ur Negative Negative    Barbiturate Screen, Ur Negative Negative    Amphetamine Screen, Ur Negative Negative    THC Presumptive Positive (A) Negative    Phencyclidine Negative Negative    Creatinine, Urine 138.8 15.0 - 325.0 mg/dL    Toxicology Information SEE COMMENT    Urinalysis Microscopic    Collection Time: 12/05/23  8:07 PM   Result Value Ref Range    RBC, UA 8 (H) 0 - 4 /hpf    WBC, UA 17 (H) 0 - 5 /hpf    Bacteria Occasional None-Occ /hpf    Squam Epithel, UA 10 /hpf    Trichomonas, UA Occasional (A) None    Microscopic Comment SEE COMMENT   "  POCT urine pregnancy    Collection Time: 12/05/23 10:42 PM   Result Value Ref Range    POC Preg Test, Ur Negative Negative     Acceptable Yes       No results found for: "PHENYTOIN", "PHENOBARB", "VALPROATE", "CBMZ"        CONSTITUTIONAL  General Appearance: unremarkable, age appropriate    MUSCULOSKELETAL  Muscle Strength and Tone:no dyskinesia, no dystonia, no tremor, no tic  Abnormal Involuntary Movements: No  Gait and Station: non-ataxic    PSYCHIATRIC   Level of Consciousness: awake and alert   Orientation: person, place, time, and situation  Grooming: Hospital garb and Well groomed  Psychomotor Behavior: normal, cooperative, friendly and cooperative, eye contact normal  Speech: normal tone, normal rate, normal pitch, normal volume, spontaneous  Language: grossly intact, able to name, able to repeat  Mood: anxious and depressed  Affect: Anxious and Full  Thought Process: linear, logical  Associations: intact   Thought Content: denies SI, denies HI, and no delusions  Perceptions: denies AH and denies  VH  Memory: Able to recall past events, Remote intact, and Recent intact  Attention:Normal and Attends to interview without distraction  Fund of Knowledge: Aware of current events and Vocabulary appropriate   Estimate if Intelligence:  Average based on work/education history, vocabulary and mental status exam  Insight: intact, has awareness of illness- fair  Judgment: behavior is adequate to circumstances, age appropriate- fair      PSYCHOSOCIAL    PSYCHOSOCIAL STRESSORS   health    FUNCTIONING RELATIONSHIPS   good support system    STRENGTHS AND LIABILITIES   Strength: Patient accepts guidance/feedback, Strength: Patient is expressive/articulate., Liability: Patient lacks social skills., Liability: Patient lacks coping skills.    Is the patient aware of the biomedical complications associated with substance abuse and mental illness? yes    Does the patient have an Advance Directive for Mental " Health treatment? no  (If yes, inform patient to bring copy.)        MEDICAL DECISION MAKING        ASSESSMENT       MDD, single episode,severe without psychotic features  R/o PPD; r/o Adjustment Disorder  Bereavement     Cannabis abuse    Psychosocial stressors      PROBLEM LIST AND MANAGEMENT PLANS    Depression: pt counseled  -Pt declined pharmacotherapy   -she is open to psychotherapy- psychotherapy provided today  -consider trial of zoloft in the future if needed    Grief: pt counseled  -SW consulted to assist with resources  -refer for grief therapy and support groups     Cannabis abuse: pt counseled     Psychosocial stressors: pt counseled  -SW consulted to assist with resources    PRESCRIPTION DRUG MANAGEMENT  Compliance: yes  Side Effects: no  Regimen Adjustments: see above    Discussed diagnosis, risks and benefits of proposed treatment vs alternative treatments vs no treatment, potential side effects of these treatments and the inherent unpredictability of treatment. The patient expresses understanding of the above and displays the capacity to agree with this treatment given said understanding. Patient also agrees that, currently, the benefits outweigh the risks and would like to pursue/continue treatment at this time.    Any medications being used off-label were discussed with the patient inclusive of the evidence base for the use of the medications and consent was obtained for the off-label use of the medication.         DIAGNOSTIC TESTING  Labs reviewed with patient; follow up pending labs    Disposition:  -Will attempt to obtain outside psychiatric records if available  -SW to assist with aftercare planning and collateral  -Once stable discharge home with outpatient follow up care and/or IOP  -Continue inpatient treatment under a PEC and/or CEC for danger to self/ danger to others/grave disability as evident by danger to self and suicidal ideation    The patient will likely be stable for discharge  home tomorrow if stability persist and pending collateral confirmation of safety. Prolonged hospitalization will likely cause radhika harm than good, especially if she misses her  child's .     Sebastian Schulz MD  Psychiatry

## 2023-12-07 VITALS
WEIGHT: 171.88 LBS | OXYGEN SATURATION: 99 % | HEART RATE: 64 BPM | BODY MASS INDEX: 29.34 KG/M2 | RESPIRATION RATE: 16 BRPM | SYSTOLIC BLOOD PRESSURE: 124 MMHG | HEIGHT: 64 IN | TEMPERATURE: 98 F | DIASTOLIC BLOOD PRESSURE: 69 MMHG

## 2023-12-07 PROBLEM — F32.A DEPRESSION WITH SUICIDAL IDEATION: Status: RESOLVED | Noted: 2023-12-06 | Resolved: 2023-12-07

## 2023-12-07 PROBLEM — R45.851 DEPRESSION WITH SUICIDAL IDEATION: Status: RESOLVED | Noted: 2023-12-06 | Resolved: 2023-12-07

## 2023-12-07 PROBLEM — Z63.4 BEREAVEMENT: Status: ACTIVE | Noted: 2023-12-07

## 2023-12-07 LAB
BACTERIA UR CULT: NORMAL
BACTERIA UR CULT: NORMAL

## 2023-12-07 PROCEDURE — 99239 HOSP IP/OBS DSCHRG MGMT >30: CPT | Mod: AF,HB,, | Performed by: PSYCHIATRY & NEUROLOGY

## 2023-12-07 PROCEDURE — 25000003 PHARM REV CODE 250: Performed by: PHYSICIAN ASSISTANT

## 2023-12-07 PROCEDURE — 90833 PSYTX W PT W E/M 30 MIN: CPT | Mod: AF,HB,, | Performed by: PSYCHIATRY & NEUROLOGY

## 2023-12-07 PROCEDURE — 99239 PR HOSPITAL DISCHARGE DAY,>30 MIN: ICD-10-PCS | Mod: AF,HB,, | Performed by: PSYCHIATRY & NEUROLOGY

## 2023-12-07 PROCEDURE — 90833 PR PSYCHOTHERAPY W/PATIENT W/E&M, 30 MIN (ADD ON): ICD-10-PCS | Mod: AF,HB,, | Performed by: PSYCHIATRY & NEUROLOGY

## 2023-12-07 RX ORDER — METRONIDAZOLE 500 MG/1
500 TABLET ORAL EVERY 12 HOURS
Qty: 14 TABLET | Refills: 0 | Status: SHIPPED | OUTPATIENT
Start: 2023-12-07 | End: 2023-12-14

## 2023-12-07 RX ADMIN — METRONIDAZOLE 500 MG: 500 TABLET ORAL at 08:12

## 2023-12-07 NOTE — DISCHARGE SUMMARY
"Discharge Summary  Psychiatry    Admit Date: 12/6/2023    Discharge Date and Time:  12/07/2023 9:43 AM    Attending Physician: Sebastian Schulz MD     Discharge Provider: Sebastian Schulz    Reason for Admission:  depression/SI, "I had a moment."      History of Present Illness:   The patient presented to the ER on 12/6/2023 . Per staff notes:  -Pt presents to the ED after telling OB nurse she has thoughts of harming self. Pt had baby on 11/28 and has suffered with PPD since. Denies past SI attempts. Denies HI/AH/VH   -This is a 23 y.o. female who presents for a psychiatric evaluation. She recently had a child, but the child passed away. She went to her regular PPD check up at her OB and admitted to "irrational things" due to being emotional. She had admitted to them that she was suicidal and has been sent here to be evaluated further. She denies any SI in the ED, and denies any plan to hurt herself. She denies any AH/VH/HI. She denies any history of smoking, drinking, or drug use. Additionally, her boyfriend who is here with her voices that he has no concerns for her wellbeing at this time.   -Pt arrives to clinic with c/o depression. Patient reports recently having and infant lost, reports having PPD, was seen earlier with OB specialty and reports being told to come here. Patient denies any active SI/HI at this time. Reports just wanting to speak to a psychiatrist.   -On interview, patient reports "I got emotional at OB appointment... I told them stuff about my son passing 1.5 weeks ago from SIDS. She asked me if I am having irrational thoughts..I told her yeah..  " She reports she would have memories of her and her son together. "It makes me emotional and overwhelmed but I would not hurt myself"  Denied having any thoughts of killing herself. Reports she is sleeping normally.   Eating less for past week.  Reports she is feeling sad but hedonia intact  Denied hopelessness  Reports she has family to live " "for  No SI or HI  Denied hx of depression, does report feeling sad at times.  Did not want any psychotropics medications  Denied panic attacks  Denied anxiety  Denied psychotic sx  This is the extent of patients complaints at this time  12 pt ros was negative aside from sx noted above  I spoke with patients SO at [phone]. He was there with her at the appointment today and has been with her ATC since baby passed. He reports "every other day she tells me it was her fault.. I tell her it wasn't and it does seem to calm her.  She is feeling some guilt about.. I have been watching her. 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 He acknowledged she is sad at times but not severely depressed, tending to ADLs and IADLs. No psychotic sx noted. He reports she is sleeping.  -Pt is 24yo PEC in with post-partum depression.  Had told her OB today that she was having SI.  Was instructed by OB to go to ER.  However, pt did not and the  ended up bringing pt to ER.  Pt does not want to be admitted.  Pt is medically cleared.   - Pt cooperative and pleasant.  Neat/clean.   Pt rights and expectations reviewed and handouts provided.  Pt signed all paperwork.  Pt minimizes reason for admit.  States "just miscommunication between me and my OB doctor."  Denies SI/HI/hallucinations.  Verbal contract for safety.  Rates depression 0/10 and anxiety 3/10.  Denies hx inpt or o/p psych.  Has been grieving death of child and wants to go home to plan burial.  Boyfriend called unit telling RN that they are needing to call people to invite for the burial and that she is just grieving, not suicidal.  Lives at home with mother, sister and boyfriend.  Admits to smoking 1 blunt THC per day.  Is currently on disability.  Appetite fair " "and sleep good.         The patient was medically cleared and admitted to the [facility].     Her son  at 3 weeks old. She has been having grief. She reports that she had been holding in her grief and "had a moment" when she saw her Ob. She feels that her symptoms were triggered by the environment having to discuss what happened.      She maintains that she is not suicidal. "I had told her that I had a suicidal thought when it first happened. She must have thought that I had meant that I was having current thoughts. I am not. I am not suicidal."     She is now denying all symptoms as documented below; there is some concern over her minimizing symptoms to secure discharge. She is concerned about being  home in time to attend to the  of her  child.      She notes positive social support. She reports willingness to attend outpatient treatment as soon as possible. She cites her family and friends as the primary reason she would not ever kill herself, "I wouldn't;t hurt them like that." She readily dicussed her future short and long term goals.          Symptoms of Depression: diminished mood - No, loss of interest/anhedonia - No;  recurrent - No, >14 days - No, diminished energy - No, change in sleep - No, change in appetite - No, diminished concentration or cognition or indecisiveness - No, PMA/R -  No, excessive guilt or hopelessness or worthlessness - No, suicidal ideations - No     Changes in Sleep: trouble with initiation- No, maintenance, - No early morning awakening with inability to return to sleep - No, hypersomnolence - No     Suicidal- active/passive ideations - No, organized plans, future intentions - No     Homicidal ideations: active/passive ideations - No, organized plans, future intentions - No     Symptoms of psychosis: hallucinations - No, delusions - No, disorganized speech - No, disorganized behavior or abnormal motor behavior - No, or negative symptoms (diminshed emotional expression, " "avolition, anhedonia, alogia, asociality) - No, active phase symptoms >1 month - No, continuous signs of illness > 6 months - No, since onset of illness decreased level of functioning present - No     Symptoms of trinity or hypomania: elevated, expansive, or irritable mood with increased energy or activity - No; > 4 days - No,  >7 days - No; with inflated self-esteem or grandiosity - No, decreased need for sleep - No, increased rate of speech - No, FOI or racing thoughts - No, distractibility - No, increased goal directed activity or PMA - No, risky/disinhibited behavior - No     Symptoms of TRAY: excessive anxiety/worry/fear, more days than not, about numerous issues - No, ongoing for >6 months - No, difficult to control - No, with restlessness - No, fatigue - No, poor concentration - No, irritability - No, muscle tension - No, sleep disturbance - No; causes functionally impairing distress - No     Symptoms of Panic Disorder: recurrent panic attacks (palpitations/heart racing, sweating, shakiness, dyspnea, choking, chest pain/discomfort, Gi symptoms, dizzy/lightheadedness, hot/col flashes, paresthesias, derealization, fear of losing control or fear of dying or fear of "going crazy") - No, precipitated - No, un-precipitated - No, source of worry and/or behavioral changes secondary for 1 month or longer- No, agoraphobia - No     Symptoms of PTSD: h/o trauma exposure - No; re-experiencing/intrusive symptoms - No, avoidant behavior - No, 2 or more negative alterations in cognition or mood - No, 2 or more hyperarousal symptoms - No; with dissociative symptoms - No, ongoing for 1 or more  months - No     Symptoms of OCD: obsessions (recurrent thoughts/urges/images; intrusive and/or unwanted; uses other thoughts/actions to suppress) - No; compulsions (repetitive behaviors used to lower distress/anxiety/obsessions) - No, time-consuming (over 1 hour per day) or cause significant distress/impairment - - No     Symptoms of " Anorexia: restriction of caloric intake leading to significantly low body weight - No, intense fear of gaining weight or persistent behavior that interferes with weight gain even thought at a significantly low weight - No, disturbance in the way in which one's body weight or shape is experienced, undue influence of body weight or shape on self evaluation, or persistent lack of recognition of the seriousness of the current low body weight - No     Symptoms of Bulimia: recurrent episodes of binge eating (definitely larger amount  than what others would eat and lack of a sense of control over eating during episode) - No, recurrent inappropriate compensatory behaviors in order to prevent weight gain (fasting, medications, exercise, vomiting) - No, binges and compensatory behaviors both occur on average at least once a week for 3 months - No, self evaluations is unduly influenced by body shape/weight- - No     Symptoms of Binge eating: recurrent episodes of binge eating (definitely larger amount than what others would eat and lack of a sense of control over eating during episode) - No, 3 or more of following (eating much more rapidly, eating until uncomfortably full, large amounts when not hungry, eating alone because of embarrassed by how much,  feeling disgusted with oneself, depressed or very guilty afterward) - No, distress regarding binges - No, binges occur on average at least once a week for 3 months - No        Substance/s:  Taken in larger amounts or over longer periods than intended: No,  Persistent desire or unsuccessful attempts to cut down or stop: No,  Great deal of time spent seeking, using or recovering from: No,  Craving or strong desire to use: No,  Recurrent use despite failure to meet major role obligation: No,  Continued use despite persistent or recurrent social/interparsonal issues due to use: No,  Important social/work/recreational activities given up due to use: No,  Recurrent use in physically  hazardous situations: No,  Continued use despite knowledge of persistent physical or psychological problem: No,  Tolerance (either increased need or diminished effect): No,    Procedures Performed: * No surgery found *    Hospital Course:    Patient was admitted to the inpatient psychiatry unit after being medically cleared in the ED. Chart and labs were reviewed. The patient was stabilized as follows:      Depression: pt counseled  -Pt declined pharmacotherapy   -she is open to psychotherapy- psychotherapy provided today  -consider trial of zoloft in the future if needed     Grief: pt counseled  -SW consulted to assist with resources  -refer for grief therapy and support groups      Cannabis abuse: pt counseled      Psychosocial stressors: pt counseled  -SW consulted to assist with resources    Trichomonas infection  Seen on U/A  Flagyl 500mg BID x 5 days     During hospitalization, the patient was encouraged to go to both groups and individual counseling. Patient was monitored for any side effects. A meeting was held with multidisciplinary team prior to discharge and pt's diagnosis, current medications, and follow up were discussed. The patient has been compliant with treatment and can adequately attend to activities of daily living in an independent manner. The patient denies any side effects. The patient denies SI, HI, plan or intent for self harm or harm to others. The patient is no longer a danger to self or others nor gravely disabled disabled. Patient discharged  in stable condition with scheduled outpatient follow up.    The patient reports improved symptoms as documented below. The patient is requesting discharge. The patient is currently stable for discharge home and is able/willing to attend outpatient care. The patient is hopeful, future oriented and goal directed. The patient readily discusses both short and long term goals. The patient can identify positive coping skills and social support.      Symptoms of Depression: diminished mood - No, loss of interest/anhedonia - No;  recurrent - No, >14 days - No, diminished energy - No, change in sleep - No, change in appetite - No, diminished concentration or cognition or indecisiveness - No, PMA/R -  No, excessive guilt or hopelessness or worthlessness - No, suicidal ideations - No     Changes in Sleep: trouble with initiation- No, maintenance, - No early morning awakening with inability to return to sleep - No, hypersomnolence - No     Suicidal- active/passive ideations - No, organized plans, future intentions - No     Homicidal ideations: active/passive ideations - No, organized plans, future intentions - No     Symptoms of psychosis: hallucinations - No, delusions - No, disorganized speech - No, disorganized behavior or abnormal motor behavior - No, or negative symptoms (diminshed emotional expression, avolition, anhedonia, alogia, asociality) - No, active phase symptoms >1 month - No, continuous signs of illness > 6 months - No, since onset of illness decreased level of functioning present - No     Symptoms of trinity or hypomania: elevated, expansive, or irritable mood with increased energy or activity - No; > 4 days - No,  >7 days - No; with inflated self-esteem or grandiosity - No, decreased need for sleep - No, increased rate of speech - No, FOI or racing thoughts - No, distractibility - No, increased goal directed activity or PMA - No, risky/disinhibited behavior - No     Symptoms of TRAY: excessive anxiety/worry/fear, more days than not, about numerous issues - No, ongoing for >6 months - No, difficult to control - No, with restlessness - No, fatigue - No, poor concentration - No, irritability - No, muscle tension - No, sleep disturbance - No; causes functionally impairing distress - No    Discussed diagnosis, risks and benefits of proposed treatment vs alternative treatments vs no treatment, and potential side effects of these treatments.  The patient  expresses understanding of the above and displays the capacity to agree with this treatment given said understanding.  Patient also agrees that, currently, the benefits outweigh the risks and would like to pursue treatment at this time.      Discharge MSE: stated age, casually dressed, well groomed.  No psychomotor agitation or retardation.  No abnormal involuntary movements.  Gait normal.  Speech normal, conversational.  Language fluent English. Mood fine.  Affect normal range, pleasant, euthymic.  Thought process linear.  Associations intact.  Denies suicidal or homicidal ideation.  Denies auditory hallucinations, paranoid ideation, ideas of reference.  Memory intact.  Attention intact.  Fund of knowledge intact.  Insight intact.  Judgment intact.  Alert and oriented to person, place, time.      Tobacco Usage:  Is patient a smoker? No  Does patient want prescription for Tobacco Cessation? No  Does patient want counseling for Tobacco Cessation? No    If patient would like to quit, then over the counter nicotine patch could be used. The patient could also follow up with his PCP or psychiatric provider for other alternatives.     Final Diagnoses:    Principal Problem: Bereavement    Secondary Diagnoses:   Unable to r/i or R/o PPD or Adjustment Disorder     Cannabis abuse     Psychosocial stressors    Labs:  Admission on 12/05/2023, Discharged on 12/06/2023   Component Date Value Ref Range Status    WBC 12/05/2023 5.22  3.90 - 12.70 K/uL Final    RBC 12/05/2023 4.68  4.00 - 5.40 M/uL Final    Hemoglobin 12/05/2023 13.2  12.0 - 16.0 g/dL Final    Hematocrit 12/05/2023 37.7  37.0 - 48.5 % Final    MCV 12/05/2023 81 (L)  82 - 98 fL Final    MCH 12/05/2023 28.2  27.0 - 31.0 pg Final    MCHC 12/05/2023 35.0  32.0 - 36.0 g/dL Final    RDW 12/05/2023 12.1  11.5 - 14.5 % Final    Platelets 12/05/2023 250  150 - 450 K/uL Final    MPV 12/05/2023 10.1  9.2 - 12.9 fL Final    Immature Granulocytes 12/05/2023 0.2  0.0 - 0.5 %  Final    Gran # (ANC) 12/05/2023 2.3  1.8 - 7.7 K/uL Final    Immature Grans (Abs) 12/05/2023 0.01  0.00 - 0.04 K/uL Final    Lymph # 12/05/2023 2.5  1.0 - 4.8 K/uL Final    Mono # 12/05/2023 0.4  0.3 - 1.0 K/uL Final    Eos # 12/05/2023 0.1  0.0 - 0.5 K/uL Final    Baso # 12/05/2023 0.03  0.00 - 0.20 K/uL Final    nRBC 12/05/2023 0  0 /100 WBC Final    Gran % 12/05/2023 43.1  38.0 - 73.0 % Final    Lymph % 12/05/2023 47.3  18.0 - 48.0 % Final    Mono % 12/05/2023 7.7  4.0 - 15.0 % Final    Eosinophil % 12/05/2023 1.1  0.0 - 8.0 % Final    Basophil % 12/05/2023 0.6  0.0 - 1.9 % Final    Differential Method 12/05/2023 Automated   Final    Sodium 12/05/2023 140  136 - 145 mmol/L Final    Potassium 12/05/2023 3.6  3.5 - 5.1 mmol/L Final    Chloride 12/05/2023 109  95 - 110 mmol/L Final    CO2 12/05/2023 25  23 - 29 mmol/L Final    Glucose 12/05/2023 99  70 - 110 mg/dL Final    BUN 12/05/2023 8  6 - 20 mg/dL Final    Creatinine 12/05/2023 0.8  0.5 - 1.4 mg/dL Final    Calcium 12/05/2023 9.2  8.7 - 10.5 mg/dL Final    Total Protein 12/05/2023 6.9  6.0 - 8.4 g/dL Final    Albumin 12/05/2023 3.7  3.5 - 5.2 g/dL Final    Total Bilirubin 12/05/2023 0.5  0.1 - 1.0 mg/dL Final    Alkaline Phosphatase 12/05/2023 110  55 - 135 U/L Final    AST 12/05/2023 21  10 - 40 U/L Final    ALT 12/05/2023 18  10 - 44 U/L Final    eGFR 12/05/2023 >60  >60 mL/min/1.73 m^2 Final    Anion Gap 12/05/2023 6 (L)  8 - 16 mmol/L Final    Specimen UA 12/05/2023 Urine, Clean Catch   Final    Color, UA 12/05/2023 Yellow  Yellow, Straw, Terese Final    Appearance, UA 12/05/2023 Hazy (A)  Clear Final    pH, UA 12/05/2023 7.0  5.0 - 8.0 Final    Specific Gravity, UA 12/05/2023 1.015  1.005 - 1.030 Final    Protein, UA 12/05/2023 Negative  Negative Final    Glucose, UA 12/05/2023 Negative  Negative Final    Ketones, UA 12/05/2023 Negative  Negative Final    Bilirubin (UA) 12/05/2023 Negative  Negative Final    Occult Blood UA 12/05/2023 1+ (A)  Negative  Final    Nitrite, UA 12/05/2023 Negative  Negative Final    Urobilinogen, UA 12/05/2023 Negative  <2.0 EU/dL Final    Leukocytes, UA 12/05/2023 3+ (A)  Negative Final    Benzodiazepines 12/05/2023 Negative  Negatiive Final    Methadone metabolites 12/05/2023 Negative  Negative Final    Cocaine (Metab.) 12/05/2023 Negative  Negative Final    Opiate Scrn, Ur 12/05/2023 Negative  Negative Final    Barbiturate Screen, Ur 12/05/2023 Negative  Negative Final    Amphetamine Screen, Ur 12/05/2023 Negative  Negative Final    THC 12/05/2023 Presumptive Positive (A)  Negative Final    Phencyclidine 12/05/2023 Negative  Negative Final    Creatinine, Urine 12/05/2023 138.8  15.0 - 325.0 mg/dL Final    Toxicology Information 12/05/2023 SEE COMMENT   Final    Alcohol, Serum 12/05/2023 <10  <10 mg/dL Final    RBC, UA 12/05/2023 8 (H)  0 - 4 /hpf Final    WBC, UA 12/05/2023 17 (H)  0 - 5 /hpf Final    Bacteria 12/05/2023 Occasional  None-Occ /hpf Final    Squam Epithel, UA 12/05/2023 10  /hpf Final    Trichomonas, UA 12/05/2023 Occasional (A)  None Final    Microscopic Comment 12/05/2023 SEE COMMENT   Final    POC Preg Test, Ur 12/05/2023 Negative  Negative Final     Acceptable 12/05/2023 Yes   Final         Discharged Condition: stable and improved; not currently a danger to self/others or gravely disabled    Disposition: Home or Self Care    Is patient being discharged on multiple neuroleptics? No    Follow Up/Patient Instructions:     Take all medications as prescribed.  Attend all psychiatric and medical follow up appointments.   Abstain from all drugs and alcohol.  Call the crisis line at: [phone] for help in a crisis and emergent situations or call 911 and Return to ED for any acute worsening of your condition including suicidal or homicidal ideations      No discharge procedures on file.        Follow up apt: local [facility]- see SW/discharge notes      Medications:  Reconciled Home Medications:       Medication List        CONTINUE taking these medications      metroNIDAZOLE 500 MG tablet  Commonly known as: FLAGYL  Take 1 tablet (500 mg total) by mouth every 12 (twelve) hours. for 7 days            STOP taking these medications      docusate sodium 100 MG capsule  Commonly known as: COLACE     ferrous sulfate 325 (65 FE) MG EC tablet     prenatal vitamin Tab                Diet: regular     Activity as tolerated    Total time spent discharging patient: 35 minutes    Sebastian Schulz MD  Psychiatry

## 2023-12-07 NOTE — PSYCH
[facility]  Encounter Date: 2023  1:36 AM    Discharge Date No discharge date for patient encounter.   Hospital Account: [id number]    MRN: [id number]   Guarantor: PABLO CERVANTES   Contact Serial #: [id number]         ENCOUNTER             Patient Class: IP Psych   Unit: [facility]*   Hospital Service: Psychiatry   Bed: [address] W   Admitting Provider: Sebastian Schulz Md   Referring Physician: Self, Aaareferral   Attending Provider: Sebastian Schulz Md   Adm Diagnosis: Depression with suicidal*      PATIENT                 Name: PABLO CERVANTES : 2000 (23 yrs)   Address: [address] [place] [address] Sex: Female   City: [place] [address]       Primary Care Provider: Anita Fischer         Primary Phone: [phone]   EMERGENCY CONTACT   Contact Name Legal Guardian? Relationship to Patient Home Phone Work Phone Mobile Phone   1. *No Contact Specified*  2. *No Contact Specified*                               GUARANTOR                  Guarantor: PABLO CERVANTES       : 2000   Address: [address] [place] [address]    Sex: [place] [address] Guarantor  Type: B/H   Relation to Patient: Self         Home Phone: [phone]   Guarantor ID: [id number]         Work Phone:     GUARANTOR EMPLOYER     Employer: [employer]           Status: PART TIME      COVERAGE          PRIMARY INSURANCE   Payor / Plan: MEDICAID/[place]       Group Number: [id number]       Subscriber Name: PABLO CERVANTES Subscriber : 2000   Subscriber ID: [id number] Pat. Rel. to Subscriber: Self   Insurance Address: [address] [address]       SECONDARY INSURANCE   Payor / Plan: - No Secondary Coverage -       Group Number:         Subscriber Name:   Subscriber :     Subscriber ID:   Pat. Rel. to Subscriber:

## 2023-12-07 NOTE — PROGRESS NOTES
Psychotherapy:  Target symptoms: depression, anxiety , substance abuse  Why chosen therapy is appropriate versus another modality: relevant to diagnosis, patient responds to this modality, evidence based practice  Outcome monitoring methods: self-report, observation  Therapeutic intervention type: insight oriented psychotherapy, behavior modifying psychotherapy, supportive psychotherapy, interactive psychotherapy  Topics discussed/themes: building skills sets for symptom management, symptom recognition  Safety planning and wrap up session  The patient's response to the intervention is accepting. The patient's progress toward treatment goals is fair.   Duration of intervention: 16 minutes.    Sebastian Schulz MD  Psychiatry

## 2023-12-07 NOTE — NURSING
Rested quietly with closed eyes and even resp for 6 hours.  Modified VC and all precautions maintained.  Pathways clear and bed in low position.

## 2023-12-07 NOTE — PSYCH
AVS  DISCHARGE INSTRUCTIONS  Antonio Holt MRN: [id number]     Bereavement   12/6/2023 - 12/7/2023   [facility]   Your Next Steps (Patient should check each box as tasks are completed)   Do       these medications from [id number] - [place], [place] - [address] [place] [facility] [address] [facility] [address]   Read      Read 7 attachments  Patient Portal  We want you to be involved with your health care. Our patient portal, called [facility], is a secure, online website for convenient 24-hour access to your personal health information.     With [facility], you can view your after visit summary, schedule appointments, request prescription refills, view test results, communicate with your health care providers, and make payments online at [email or web address]           Instructions    Your medications have changed   STOP taking:  docusate sodium 100 MG capsule (COLACE)   ferrous sulfate 325 (65 FE) MG EC tablet   prenatal vitamin Tab   Review your updated medication list below.  What's Next  What's Next         Follow up with [facility]  Wednesday Dec 13, 2023  as scheduled on 12.13.2023 for  at 8:15-8:45 am for the, INTENSIVE OUTPATIENT PROGRAM (IOP) [address] [address]  [phone]     All Component Based Labs   12/05/23  2242  12/05/23  2007  12/05/23  1917  12/05/23  1908    Benzodiazepines  Negative     Methadone metabolites  Negative     Phencyclidine  Negative     Albumin    3.7   Alcohol, Serum    <10   ALP    110   ALT    18   Amphetamine Screen, Ur  Negative     Anion Gap    6 Low    Appearance, UA  Hazy Abnormal      AST    21   Bacteria, UA  Occasional     Barbiturate Screen, Ur  Negative     Baso #   0.03    Basophil %   0.6    Bilirubin (UA)  Negative     BILIRUBIN TOTAL    0.5   BUN    8   Calcium    9.2   Chloride    109   CO2    25   Cocaine (Metab.)  Negative     Color, UA  Yellow     Creatinine    0.8    Creatinine, Urine  138.8     Differential Method   Automated    eGFR    >60   Eos #   0.1    Eosinophil %   1.1    Glucose    99   Glucose, UA  Negative     Gran # (ANC)   2.3    Gran %   43.1    Hematocrit   37.7    Hemoglobin   13.2    Immature Grans (Abs)   0.01    Immature Granulocytes   0.2    Ketones, UA  Negative     Leukocytes, UA  3+ Abnormal      Lymph #   2.5    Lymph %   47.3    MCH   28.2    MCHC   35.0    MCV   81 Low     Microscopic Comment  SEE COMMENT     Mono #   0.4    Mono %   7.7    MPV   10.1    NITRITE UA  Negative     nRBC   0    Occult Blood UA  1+ Abnormal      Opiate Scrn, Ur  Negative     pH, UA  7.0     Platelet Count   250    Potassium    3.6   Preg Test, Ur Negative      PROTEIN TOTAL    6.9   Protein, UA  Negative      Acceptable Yes      RBC   4.68    RBC, UA  8 High      RDW   12.1    Sodium    140   Specific Gravity, UA  1.015     Specimen UA  Urine, Clean Catch     Squam Epithel, UA  10     Marijuana (THC) Metabolite  Presumptive Positive Abnormal      Toxicology Information  SEE COMMENT     Trichomonas, UA  Occasional Abnormal      UROBILINOGEN UA  Negative     WBC, UA  17 High      WBC   5.22                  Your care is important to us. If your provider recommended a follow-up appointment or test, we are happy to help you coordinate your recommended care. It is important that you complete your recommended follow-up.  If you need help scheduling, please call [phone]. Appointments can also be made online through the patient portal.      While scheduling and attending your appointments is your responsibility, our goal is to support and empower you throughout that process.         Your Diagnoses at Discharge   Bereavement  Marijuana use  Trichomonas infection  Reason for Admission  per [facility]-pt with no known psychiatric histroy who presented to ED for psychiatric evaluation for making suicidal ideation.  Comments documented in OB note.  Pt and family/friend at  "bedside state pt is not suicidal or risk to self.  seen by telepsych , recent child death  You are allergic to the following  You are allergic to the following  No active allergies     Your Latest Vitals    Blood Pressure 124/69       BMI 29.50       Weight 171 lb 13.6 oz       Height 5' 4"       Temperature 97.8 °F       Pulse 64       Respiration 16       Oxygen Saturation 99%       BSA 1.88 m²  Treatment Team  Chat With All Treatment Team   Provider Role Specialty    Devika Dubois PA-C  Consulting Physician Hospitalist    Sebastian Schulz MD  Admitting Psychiatry     Recent Lab Values   Include labs without results  No lab values to display.  Blood Glucose and Lipid Panel Labs   Include labs without results  No labs found from the past 365 days.  Pending Labs/Studies  You have no pending labs/studies.  Contact Information  Call [phone] (anytime, 7 days a week) to:  Report concerns regarding hospital stay  Ask questions about your hospital stay and home care plan  Get new or updated results of your lab tests and other procedures  [facility]  [facility] Nurse Care Line - 24/7 Assistance  Unless otherwise directed by your provider, please contact [facility], our nurse care line that is available for 24/7 assistance. Please refer to the Patient Instructions section of your After Visit Summary for specific instructions from your physician.     Registered nurses in the [facility] Center provide appointment scheduling, clinical advisement, health education, and other advisory services.  Call: [phone] (toll free).  Advance Directives  An advance directive is a document which, in the event you are no longer able to make decisions for yourself, tells your healthcare team what kind of treatment you do or do not want to receive, or who you would like to make those decisions for you.  If you do not currently have an advance directive, [facility] encourages you to create one.  For " "more information call:  [phone]), [phone] ([phone]),  or log on to [email or web address].  Advance Directive Status  Flowsheet Row Most Recent Value   Advance Directive Status Patient does not have Advance Directive, declines information.   Advance Directive Type Both psychiatric and medical Advance Directives   Reason you declined to provide an Advance Directive Do not feel it is needed     Behavioral Health Safety Plan          Step 1: Warning Signs:     Pt stated" I get overwhlemed."     Pt stated" I get irriteated."     Pt stated" I get frustrated.'       Step 2: Internal coping strategies - Things I can do to take my mind off my problems without contacting another person:     Pt stated" I like to listen to music."     Pt stated" I like to clean the house."     Pt stated" I take long baths."       Step 3: People and social settings that provide distraction:     Name: Katelynn/best friend Phone: In cell phone     Name: Mabel/best friend Phone: In cell phone     Place: Pt stated" I call her on the phone."     Place: Pt stated" I call her on the phone."       Step 4: People whom I can ask for help:     Name: Dale Hudson/mother Phone: In cell phone     Name: Freeman New/boyfriend Phone: In cell phone     Name: Luis Alberto New/boyfriend mom Phone: In cell phone       Step 5: Professionals or agencies I can contact during a crisis:     Clinician Name: [facility] Phone: [phone]     Clinician Pager or Emergency Contact #: [phone]           Clinician Name: [facility] Phone: [phone]     Clinician Pager or Emergency Contact #: [phone]           Suicide Prevention Lifeline: 988 or 2-159-181-YZJM (3378)           Local Emergency Service: [facility]     Emergency Services Address: [address] [place] [id number]     Emergency Services Phone: 911,-020 [place] Crisis Line, [place] Line, [place] Crisis and Suicide Hotline       Step " "6: Making the environment safer:     Pt stated" I have been sleeping down stairs and changing things around at home."   2. Pt stated"Myself."         Used with permission from KELLIE Mora & SOILA Babcock. (2011). Safety planning intervention: A brief intervention to mitigate suicide risk. Cognitive and Behavioral Practice. 19, 256-264            Language Assistance Services  ATTENTION: Language assistance services are available, free of charge. Please call [phone].       ATENCIÓN: Si habla rich, tiene a castro disposición servicios gratuitos de asistencia lingüística. Llame al [phone].     CHÚ Ý: N?u b?n nói Ti?ng Vi?t, có các d?ch v? h? tr? ngôn ng? mi?n phí dành cho b?n. G?i s? [phone].  National Suicide Prevention Lifeline  If you or someone you know is thinking about suicide, call the National Suicide Prevention Lifeline using the 3 digit phone number of 956 or 4-775-161-BRNE (8758).  The lifeline is free, confidential and always available.  Help a loved one, a friend or yourself.  www.suicidepreventionlifeline.org     Medication list    CONTINUE taking these medications  CONTINUE taking these medications    Additional info         metroNIDAZOLE 500 MG tablet  Commonly known as: FLAGYL  Quantity: 14 tablet  Refills: 0  Dose: 500 mg Take 1 tablet (500 mg total) by mouth every 12 (twelve) hours. for 7 days Begin Date AM Noon PM Bedtime     STOP taking these medications  STOP taking these medications   docusate sodium 100 MG capsule  Commonly known as: COLACE    ferrous sulfate 325 (65 FE) MG EC tablet    prenatal vitamin Tab            Where to  your medications     these medications at [id number] - [place], [place] - [address] AT [facility] [address]  metroNIDAZOLE  Address: [address] [facility] [address]   Phone: [phone]     "

## 2023-12-07 NOTE — PLAN OF CARE
Problem: Adult Behavioral Health Plan of Care  Goal: Rounds/Family Conference  Outcome: Ongoing, Progressing  Flowsheets (Taken 2023 8654)  Participants:   psychiatrist   nursing   other ([facility])   TREATMENT TEAM      Chief Complaint:      Pt is a 23 year old female with chief complaints of thoughts of harming self.   Pt states her 3 week old son passed away about 1.5 week ago and it just became overwhelming for her to take in.   Pt reports having PPD  and was seen earlier with her OBspecialty and reports being told to come here.      Pt Goal(s):    Pt states to go home and tend to her sons .    Current Progress:   Pt attended treatment team dressed in personal clothing shirt with blue scrub pants.     Pt affect anxious/depressed mood  Pt states the  home does not call her back to give a date to bury her son.     Program/groups:    Encourage pt to continue to attend  all groups. Pt states she has been feeling more relaxed while in group.       Revisions to Plan:  Encourage pt to attend treatment team and to attend all groups. Recommendations are for pt to attend psychotherapy, grief counseling, and medication management.

## 2023-12-07 NOTE — PLAN OF CARE
"Denies suicidal thoughts at this time.  Calm and cooperative.  Accepted workbook on CBT.   Said her bf/"baby's dad" is going to halfway in 4 days and she needs to plan  for her  child.  Guarded about bf.  Reports living with her mom and states mom supportive.  Actively listened.  Support given.    "

## 2023-12-07 NOTE — PROGRESS NOTES
"   23 1106   Assessment   Patient's Identification of the Problem Patient presents anxious, depressed, reports a "fine" mood, "I feel like God gave me my child for a reason and God took him back." Patient states her admit is due to grief and depression, "just intrusive thoughts." Patient admits to negative leisure lifestyle of using marijuana daily. Patient reports she is in a committed relationship, recently loss her baby boy who was 3 weeks old, is high school graduate, employed as a , receive maternity leave disability, lives in [place] with her mother, sister and boyfriend. Patient verbalized her main goal, "I want to just go home, have the  service and be with my family."   Leisure Interest House Chores;Enjoy Family/Friends;Shopping;Walk;Listen to Music;Watching TV;Classical/Table Games;Movies;Cooking  (spend time with boyfriend, color sometime)   Leisure Barriers Other (See Comments)  ("nothing")   Treatment Focus To Improve Mood;To Improve Leisure Awareness/Lifestyle/Interest;To Promote Successful and Safe Self Expression;To Improve Coping Skills;To Educate and Increase Awareness of Sober Free Activities       Treatment Recommendation:   1:1 Intervention (as needed)    Cognitive Stimulation Skilled Activity  Creative Expression Skilled Activity  Mild Exercises Skilled Activity  Stress Management Skilled Activity  Coping Skilled Activity  Leisure Education and Awareness Skilled Activity    Treatment Goal(s):  Long Term Goals Refer To Master Treatment Plan    Short Term Treatment Goal(s)  Patient Will:  Comply with Treatment  Exhibit Improvement in Mood  Demonstrate Constructive Expression of Feelings and Behavior  Verbalize Improvement in Mood  Identify at Least 2 Coping Skills or Leisure Skills to Reduce Depression and Hopelessness Upon Request from Therapist    Discharge Recommendations:  Encourage Patient to Actively Utilize Available Community Resources to Increase Leisure " Involvement to Decrease Signs and Symptoms of Illness  Encourage Patient to Utilize Coping Skills on a Regular Basis to Reduce the Risk of Decompensating and Re-Hospitalizations  Follow Up with After Care Appointments  Continue with Current Leisure Activities

## 2023-12-07 NOTE — NURSING
Pt discharge arranged for today.  All belongings accounted for and will be returned upon discharge.  AVS and meds reviewed with pt, understanding verbalized.  Pt mood stable.  Denies any S/I or H/I at this time.  Pt BF coming to pick her up.

## 2023-12-08 NOTE — PSYCH
The patient has transitioned to outpatient treatment with [facility], [address] [place] [address], [place] [address], [place] [address], [phone].  The patient will begin the intensive outpatient program on 12/13/2023 at 8:45 am.  While being treated the patient will receive medication management, individual and group therapy. The AVS was faxed on 12/07/2023 at 1:41 pm.

## 2024-06-11 ENCOUNTER — HOSPITAL ENCOUNTER (EMERGENCY)
Facility: HOSPITAL | Age: 24
Discharge: HOME | End: 2024-06-11
Attending: EMERGENCY MEDICINE
Payer: MEDICAID

## 2024-06-11 VITALS
OXYGEN SATURATION: 98 % | BODY MASS INDEX: 26.8 KG/M2 | SYSTOLIC BLOOD PRESSURE: 122 MMHG | DIASTOLIC BLOOD PRESSURE: 71 MMHG | HEIGHT: 64 IN | RESPIRATION RATE: 16 BRPM | HEART RATE: 70 BPM | TEMPERATURE: 98 F | WEIGHT: 157 LBS

## 2024-06-11 DIAGNOSIS — R19.7 NAUSEA, VOMITING AND DIARRHEA: Primary | ICD-10-CM

## 2024-06-11 DIAGNOSIS — B96.89 BV (BACTERIAL VAGINOSIS): ICD-10-CM

## 2024-06-11 DIAGNOSIS — N76.0 BV (BACTERIAL VAGINOSIS): ICD-10-CM

## 2024-06-11 DIAGNOSIS — R11.2 NAUSEA, VOMITING AND DIARRHEA: Primary | ICD-10-CM

## 2024-06-11 LAB
ALBUMIN SERPL BCP-MCNC: 3.7 G/DL (ref 3.5–5.2)
ALP SERPL-CCNC: 91 U/L (ref 55–135)
ALT SERPL W/O P-5'-P-CCNC: 9 U/L (ref 10–44)
ANION GAP SERPL CALC-SCNC: 9 MMOL/L (ref 8–16)
AST SERPL-CCNC: 16 U/L (ref 10–40)
B-HCG UR QL: NEGATIVE
BACTERIA #/AREA URNS AUTO: ABNORMAL /HPF
BACTERIA GENITAL QL WET PREP: ABNORMAL
BASOPHILS # BLD AUTO: 0.03 K/UL (ref 0–0.2)
BASOPHILS NFR BLD: 0.6 % (ref 0–1.9)
BILIRUB SERPL-MCNC: 0.6 MG/DL (ref 0.1–1)
BILIRUB UR QL STRIP: NEGATIVE
BUN SERPL-MCNC: 5 MG/DL (ref 6–30)
BUN SERPL-MCNC: 7 MG/DL (ref 6–20)
CALCIUM SERPL-MCNC: 9 MG/DL (ref 8.7–10.5)
CHLORIDE SERPL-SCNC: 105 MMOL/L (ref 95–110)
CHLORIDE SERPL-SCNC: 109 MMOL/L (ref 95–110)
CLARITY UR REFRACT.AUTO: ABNORMAL
CLUE CELLS VAG QL WET PREP: ABNORMAL
CO2 SERPL-SCNC: 21 MMOL/L (ref 23–29)
COLOR UR AUTO: YELLOW
CREAT SERPL-MCNC: 0.6 MG/DL (ref 0.5–1.4)
CREAT SERPL-MCNC: 0.7 MG/DL (ref 0.5–1.4)
CTP QC/QA: YES
DIFFERENTIAL METHOD BLD: ABNORMAL
EOSINOPHIL # BLD AUTO: 0.1 K/UL (ref 0–0.5)
EOSINOPHIL NFR BLD: 1 % (ref 0–8)
ERYTHROCYTE [DISTWIDTH] IN BLOOD BY AUTOMATED COUNT: 12.4 % (ref 11.5–14.5)
EST. GFR  (NO RACE VARIABLE): >60 ML/MIN/1.73 M^2
FILAMENT FUNGI VAG WET PREP-#/AREA: ABNORMAL
GLUCOSE SERPL-MCNC: 89 MG/DL (ref 70–110)
GLUCOSE SERPL-MCNC: 91 MG/DL (ref 70–110)
GLUCOSE UR QL STRIP: NEGATIVE
HCT VFR BLD AUTO: 36.6 % (ref 37–48.5)
HCT VFR BLD CALC: 34 %PCV (ref 36–54)
HCV AB SERPL QL IA: NORMAL
HGB BLD-MCNC: 12.4 G/DL (ref 12–16)
HGB UR QL STRIP: NEGATIVE
IMM GRANULOCYTES # BLD AUTO: 0.01 K/UL (ref 0–0.04)
IMM GRANULOCYTES NFR BLD AUTO: 0.2 % (ref 0–0.5)
KETONES UR QL STRIP: NEGATIVE
LEUKOCYTE ESTERASE UR QL STRIP: ABNORMAL
LIPASE SERPL-CCNC: 12 U/L (ref 4–60)
LYMPHOCYTES # BLD AUTO: 2.6 K/UL (ref 1–4.8)
LYMPHOCYTES NFR BLD: 54 % (ref 18–48)
MCH RBC QN AUTO: 27.9 PG (ref 27–31)
MCHC RBC AUTO-ENTMCNC: 33.9 G/DL (ref 32–36)
MCV RBC AUTO: 82 FL (ref 82–98)
MICROSCOPIC COMMENT: ABNORMAL
MONOCYTES # BLD AUTO: 0.3 K/UL (ref 0.3–1)
MONOCYTES NFR BLD: 6.9 % (ref 4–15)
NEUTROPHILS # BLD AUTO: 1.8 K/UL (ref 1.8–7.7)
NEUTROPHILS NFR BLD: 37.3 % (ref 38–73)
NITRITE UR QL STRIP: NEGATIVE
NRBC BLD-RTO: 0 /100 WBC
PH UR STRIP: 7 [PH] (ref 5–8)
PLATELET # BLD AUTO: 265 K/UL (ref 150–450)
PMV BLD AUTO: 10.4 FL (ref 9.2–12.9)
POC IONIZED CALCIUM: 1.19 MMOL/L (ref 1.06–1.42)
POC TCO2 (MEASURED): 24 MMOL/L (ref 23–29)
POTASSIUM BLD-SCNC: 3.6 MMOL/L (ref 3.5–5.1)
POTASSIUM SERPL-SCNC: 3.7 MMOL/L (ref 3.5–5.1)
PROT SERPL-MCNC: 6.7 G/DL (ref 6–8.4)
PROT UR QL STRIP: ABNORMAL
RBC # BLD AUTO: 4.44 M/UL (ref 4–5.4)
RBC #/AREA URNS AUTO: 3 /HPF (ref 0–4)
SAMPLE: ABNORMAL
SODIUM BLD-SCNC: 139 MMOL/L (ref 136–145)
SODIUM SERPL-SCNC: 139 MMOL/L (ref 136–145)
SP GR UR STRIP: >1.03 (ref 1–1.03)
SPECIMEN SOURCE: ABNORMAL
SQUAMOUS #/AREA URNS AUTO: 11 /HPF
T VAGINALIS GENITAL QL WET PREP: ABNORMAL
URN SPEC COLLECT METH UR: ABNORMAL
WBC # BLD AUTO: 4.78 K/UL (ref 3.9–12.7)
WBC #/AREA URNS AUTO: 15 /HPF (ref 0–5)
WBC #/AREA VAG WET PREP: ABNORMAL
YEAST GENITAL QL WET PREP: ABNORMAL

## 2024-06-11 PROCEDURE — 87491 CHLMYD TRACH DNA AMP PROBE: CPT | Performed by: EMERGENCY MEDICINE

## 2024-06-11 PROCEDURE — 80053 COMPREHEN METABOLIC PANEL: CPT | Performed by: EMERGENCY MEDICINE

## 2024-06-11 PROCEDURE — 83690 ASSAY OF LIPASE: CPT | Performed by: EMERGENCY MEDICINE

## 2024-06-11 PROCEDURE — 25000003 PHARM REV CODE 250

## 2024-06-11 PROCEDURE — 81025 URINE PREGNANCY TEST: CPT | Performed by: EMERGENCY MEDICINE

## 2024-06-11 PROCEDURE — 81001 URINALYSIS AUTO W/SCOPE: CPT | Performed by: EMERGENCY MEDICINE

## 2024-06-11 PROCEDURE — 80047 BASIC METABLC PNL IONIZED CA: CPT

## 2024-06-11 PROCEDURE — 82330 ASSAY OF CALCIUM: CPT

## 2024-06-11 PROCEDURE — 96374 THER/PROPH/DIAG INJ IV PUSH: CPT

## 2024-06-11 PROCEDURE — 87210 SMEAR WET MOUNT SALINE/INK: CPT | Performed by: EMERGENCY MEDICINE

## 2024-06-11 PROCEDURE — 99284 EMERGENCY DEPT VISIT MOD MDM: CPT | Mod: 25

## 2024-06-11 PROCEDURE — 85025 COMPLETE CBC W/AUTO DIFF WBC: CPT | Performed by: EMERGENCY MEDICINE

## 2024-06-11 PROCEDURE — 96361 HYDRATE IV INFUSION ADD-ON: CPT

## 2024-06-11 PROCEDURE — 63600175 PHARM REV CODE 636 W HCPCS

## 2024-06-11 PROCEDURE — 86803 HEPATITIS C AB TEST: CPT | Performed by: PHYSICIAN ASSISTANT

## 2024-06-11 PROCEDURE — 87086 URINE CULTURE/COLONY COUNT: CPT | Performed by: EMERGENCY MEDICINE

## 2024-06-11 RX ORDER — METRONIDAZOLE 500 MG/1
500 TABLET ORAL EVERY 12 HOURS
Qty: 14 TABLET | Refills: 0 | Status: SHIPPED | OUTPATIENT
Start: 2024-06-11 | End: 2024-06-18

## 2024-06-11 RX ORDER — ONDANSETRON 4 MG/1
4 TABLET, ORALLY DISINTEGRATING ORAL
Status: DISCONTINUED | OUTPATIENT
Start: 2024-06-11 | End: 2024-06-11

## 2024-06-11 RX ORDER — ONDANSETRON HYDROCHLORIDE 2 MG/ML
4 INJECTION, SOLUTION INTRAVENOUS
Status: COMPLETED | OUTPATIENT
Start: 2024-06-11 | End: 2024-06-11

## 2024-06-11 RX ORDER — ONDANSETRON 4 MG/1
4 TABLET, FILM COATED ORAL EVERY 6 HOURS
Qty: 12 TABLET | Refills: 0 | Status: SHIPPED | OUTPATIENT
Start: 2024-06-11 | End: 2024-06-14

## 2024-06-11 RX ADMIN — SODIUM CHLORIDE 1000 ML: 9 INJECTION, SOLUTION INTRAVENOUS at 01:06

## 2024-06-11 RX ADMIN — ONDANSETRON 4 MG: 2 INJECTION INTRAMUSCULAR; INTRAVENOUS at 01:06

## 2024-06-11 NOTE — ED TRIAGE NOTES
Antonio Holt, a 23 y.o. female presents to the ED w/ complaint of nausea and vomiting patient also here for a vaginal discharge check   Triage note:  Chief Complaint   Patient presents with    Vomiting    Diarrhea     Diarrhea twice last night and vomited once     Review of patient's allergies indicates:  No Known Allergies  History reviewed. No pertinent past medical history.

## 2024-06-11 NOTE — ED NOTES
Patient identifiers for Antonio Holt 23 y.o. female checked and correct.  Chief Complaint   Patient presents with    Vomiting    Diarrhea     Diarrhea twice last night and vomited once     History reviewed. No pertinent past medical history.  Allergies reported: Review of patient's allergies indicates:  No Known Allergies    Appearance: Pt awake, alert & oriented to person, place & time. Pt in no acute distress at present time. Pt is clean and well groomed with clothes appropriately fastened.   Skin: Skin warm, dry & intact. Color consistent with ethnicity. Mucous membranes moist. No breakdown or brusing noted.   Musculoskeletal: Patient moving all extremities well, no obvious swelling or deformities noted.   Respiratory: Respirations spontaneous, even, and non-labored. Visible chest rise noted. Airway is open and patent. No accessory muscle use noted.   Neurologic: Sensation is intact. Speech is clear and appropriate. Eyes open spontaneously, behavior appropriate to situation, follows commands, facial expression symmetrical, bilateral hand grasp equal and even, purposeful motor response noted.  Cardiac: All peripheral pulses present. No Bilateral lower extremity edema. Cap refill is <3 seconds.  Abdomen: Abdomen soft, non distended, non tender to palpation. Nausea and vomiting   : Pt voids independently, denies dysuria, hematuria, frequency. Vaginal discharge

## 2024-06-11 NOTE — Clinical Note
"Antonio"Marsha Holt was seen and treated in our emergency department on 6/11/2024.  She may return to school on 06/12/2024.      If you have any questions or concerns, please don't hesitate to call.      Gumaro Vasquez MD"

## 2024-06-11 NOTE — ED PROVIDER NOTES
Encounter Date: 6/11/2024       History     Chief Complaint   Patient presents with    Vomiting    Diarrhea     Diarrhea twice last night and vomited once     23-year-old female who presents to the ED for chief complaint of vomiting and diarrhea.  Patient states she had 1 episode of vomiting last night and 2 episodes of diarrhea today.  She has been able to tolerate drinking fluids, but states she does have a decreased appetite.  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  Patient also notes that she has had some increased vaginal discharge and would like to be evaluated.  She denies any vaginal pain or itching.  She was diagnosed with Trichomonas in the past.  She denies any recent unprotected sexual encounters.  She denies any fevers, chest pain, SOB, abdominal pain, or changes in urination.    The history is provided by the patient. No  was used.     Review of patient's allergies indicates:  No Known Allergies  History reviewed. No pertinent past medical history.  History reviewed. No pertinent surgical history.  Family History   Problem Relation Name Age of Onset    No Known Problems Mother Evans     No Known Problems Father Kevin     No Known Problems Sister Reyna     No Known Problems Sister Mendez     No Known Problems Brother Kevin Rod     No Known Problems Brother Daryn     Early death Son Terry     SIDS Son Terry     No Known Problems Maternal Aunt Lanie     No Known Problems Maternal Uncle Ciaran     No Known Problems Paternal Aunt Marley     Drug abuse Maternal Grandmother Latoya     Alcohol abuse Maternal Grandmother Latoya     Rectal cancer Maternal Grandmother Latoay     No Known Problems Maternal Grandfather Precious     No Known Problems Paternal Grandmother Maya     No Known Problems Paternal Grandfather Kevin     Breast cancer Neg Hx      Colon cancer Neg Hx      Ovarian cancer Neg Hx       Social History     Tobacco Use     Smoking status: Never    Smokeless tobacco: Never   Substance Use Topics    Alcohol use: Not Currently    Drug use: Yes     Types: Marijuana       Physical Exam     Initial Vitals [06/11/24 1109]   BP Pulse Resp Temp SpO2   112/72 74 18 98.4 °F (36.9 °C) 98 %      MAP       --         Physical Exam    Nursing note and vitals reviewed.  Constitutional: She appears well-developed. No distress.   Patient is sitting on bed in no apparent distress   HENT:   Head: Normocephalic.   Mouth/Throat: Oropharynx is clear and moist.   Eyes: Conjunctivae and EOM are normal. No scleral icterus.   Neck: Neck supple.   Normal range of motion.  Cardiovascular:  Normal rate, regular rhythm and normal heart sounds.           Pulmonary/Chest: Breath sounds normal. No respiratory distress. She has no wheezes. She has no rhonchi. She has no rales.   Abdominal: Abdomen is soft. She exhibits no distension. There is no abdominal tenderness. There is no rebound, no guarding, no tenderness at McBurney's point and negative Goldstein's sign.   Genitourinary:    Vaginal discharge (Increased and purulent appearing) present.      Genitourinary Comments: No lesions on external vagina.  No cervical motion tenderness.  No cervical erythema or lesions.     Musculoskeletal:         General: Normal range of motion.      Cervical back: Normal range of motion and neck supple.     Neurological: She is alert.   Skin: Skin is warm. Capillary refill takes less than 2 seconds.   Psychiatric: She has a normal mood and affect.         ED Course   Procedures  Labs Reviewed   VAGINAL SCREEN - Abnormal; Notable for the following components:       Result Value    Clue Cells Rare (*)     WBC - Vaginal Screen Few (*)     Bacteria - Vaginal Screen Moderate (*)     All other components within normal limits    Narrative:     Release to patient->Immediate   CBC W/ AUTO DIFFERENTIAL - Abnormal; Notable for the following components:    Hematocrit 36.6 (*)     Gran % 37.3 (*)      Lymph % 54.0 (*)     All other components within normal limits    Narrative:     Release to patient->Immediate   COMPREHENSIVE METABOLIC PANEL - Abnormal; Notable for the following components:    CO2 21 (*)     ALT 9 (*)     All other components within normal limits    Narrative:     Release to patient->Immediate   URINALYSIS, REFLEX TO URINE CULTURE - Abnormal; Notable for the following components:    Appearance, UA Hazy (*)     Specific Gravity, UA >1.030 (*)     Protein, UA Trace (*)     Leukocytes, UA Trace (*)     All other components within normal limits    Narrative:     Specimen Source->Urine   URINALYSIS MICROSCOPIC - Abnormal; Notable for the following components:    WBC, UA 15 (*)     Bacteria Many (*)     All other components within normal limits    Narrative:     Specimen Source->Urine   ISTAT PROCEDURE - Abnormal; Notable for the following components:    POC BUN 5 (*)     POC Hematocrit 34 (*)     All other components within normal limits   CULTURE, URINE   LIPASE    Narrative:     Release to patient->Immediate   HEPATITIS C ANTIBODY    Narrative:     Release to patient->Immediate   C. TRACHOMATIS/N. GONORRHOEAE BY AMP DNA   POCT URINE PREGNANCY   ISTAT CHEM8          Imaging Results    None          Medications   sodium chloride 0.9% bolus 1,000 mL 1,000 mL (0 mLs Intravenous Stopped 6/11/24 1445)   ondansetron injection 4 mg (4 mg Intravenous Given 6/11/24 1317)     Medical Decision Making  23-year-old female who presents with emesis and diarrhea.  She also has concern for abnormal vaginal discharge.  Vitals are WNL.  On exam, her abdomen is soft and nontender.  She appears to have purulent appearing discharge in vaginal canal.  Please see physical exam findings above for additional details.  Differential diagnoses include but are not limited to URI, gastroenteritis, pancreatitis, electrolyte abnormality, gonorrhea, chlamydia, bacterial vaginosis, Trichomonas, UTI.  Moderate suspicion of  "gastroenteritis given that the patient works in a nursing home and has diarrhea and emesis.  There is also suspicion of an STI since patient has purulent appearing vaginal discharge.  Obtained labs, administered Zofran for nausea, and ordered 1 L bolus of normal saline.  Patient was p.o. challenged and she was able to tolerate drinking fluids.  Please see ED course for additional details.    Discussed clinical findings with patient and advised patient that she can take Tylenol and ibuprofen for pain.  Prescribed Zofran for nausea and Flagyl for bacterial vaginosis.  Discussed follow up with primary care provider and precautions for when to return to the emergency department.  Answered remaining questions.  Patient will be discharged to home.    Amount and/or Complexity of Data Reviewed  External Data Reviewed: labs.     Details: 6/20/2023 had rare trich noted on vaginal swab   Labs: ordered. Decision-making details documented in ED Course.    Risk  Prescription drug management.              Attending Attestation:   Physician Attestation Statement for Resident:  As the supervising MD   Physician Attestation Statement: I have personally seen and examined this patient.   I agree with the above history.  -: 24 yo female presenting with N/V/D.  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  Also requesting to be re-tested for trichomoniasis, stating she had a prior positive test result and was not able to fill the prescription.    As the supervising MD I agree with the above PE.   -: Afebrile, well appearing  RRR, lungs clear  Abdomen soft, non-tender, neg Goldstein's, neg McBurney's    As the supervising MD I agree with the above treatment, course, plan, and disposition.   -: Labs reviewed - no leukocytosis, no electrolyte abnormality, U preg negative.   Favor gastroenteritis, possibly viral.   Do not believe advanced imaging is indicated " at this time and risk of radiation exposure would outweigh benefit given exam.   However, we discussed strict return precautions, celine if development of fever, localized pain.   Pelvic exam not c/w PID, will tx for BV. UA reviewed - likely contaminant, no UTI sx.   Signed out to oncoming attending at 2p pending reassessment after PO challenge. Anticipate d/c with close PCP f/u and GYN for well-woman exam.    I have reviewed and agree with the residents interpretation of the following: lab data.  I have reviewed the following: old records at this facility.                ED Course as of 06/11/24 1640   Tue Jun 11, 2024   1310 hCG Qualitative, Urine: Negative [AB]   1349 WBC: 4.78  No leukocytosis  [AB]   1349 Potassium: 3.7  Normal  [AB]   1350 Lipase: 12  Normal  [AB]   1401 Clue Cells, Wet Prep(!): Rare [MD]      ED Course User Index  [AB] Eliot Aguiar MD  [MD] Gumaro Vasquez MD                           Clinical Impression:  Final diagnoses:  [R11.2, R19.7] Nausea, vomiting and diarrhea (Primary)  [N76.0, B96.89] BV (bacterial vaginosis)          ED Disposition Condition    Discharge Stable          ED Prescriptions       Medication Sig Dispense Start Date End Date Auth. Provider    metroNIDAZOLE (FLAGYL) 500 MG tablet Take 1 tablet (500 mg total) by mouth every 12 (twelve) hours. for 7 days 14 tablet 6/11/2024 6/18/2024 Nicky Morales MD    ondansetron (ZOFRAN) 4 MG tablet Take 1 tablet (4 mg total) by mouth every 6 (six) hours. for 12 doses 12 tablet 6/11/2024 6/14/2024 Nicky Morales MD          Follow-up Information       Follow up With Specialties Details Why Contact Info    [facility]  Schedule an appointment as soon as possible for a visit   [address]  [facility] [address] [phone]      [facility] - Emergency Dept Emergency Medicine  As needed, If symptoms worsen [address] [facility] [address] [phone]               Gumaro Vasquez  MD  Resident  06/11/24 1640       Eliot Aguiar MD  06/11/24 3452

## 2024-06-11 NOTE — ED NOTES
I-STAT Chem-8+ Results:   Value Reference Range   Sodium 139 136-145 mmol/L   3.6  3.5-5.1 mmol/L   Chloride 105  mmol/L   Ionized Calcium 1.19 1.06-1.42 mmol/L   CO2 (measured) 24 23-29 mmol/L   Glucose 91  mg/dL   BUN 5 6-30 mg/dL   Creatinine 0.6 0.5-1.4 mg/dL   Hematocrit 34 36-54%

## 2024-06-11 NOTE — DISCHARGE INSTRUCTIONS
You were seen today for nausea, vomiting and diarrhea.  You can take the Zofran as prescribed for nausea and vomiting.  You should continue to hydrate well at home.  Your STD testing today was negative for Trichomonas.  You do have evidence of bacterial vaginosis.  Please take the Flagyl as prescribed.  Please make sure that you eat before taking this medication as it can cause upset stomach.  Please do not drink alcohol while taking this medication as it can cause severe nausea and vomiting.  Your gonorrhea and chlamydia results have not resulted yet today.  If these are positive, you will receive a call from someone in our emergency department.  Alternatively, you can follow up with your primary care doctor within 3 days for these results.  Please follow up with your primary care doctor within the next week for re-evaluation.  If you develop any fever, abdominal pain, vomiting that is not controlled with the medicines prescribed to you today, dehydration or any other worsening symptoms or concerns, you should return to the emergency department for re-evaluation.

## 2024-06-11 NOTE — PROGRESS NOTES
I received this patient in changeover.  Briefly, patient is a previously healthy 23-year-old female who presents for nausea, vomiting and diarrhea.  Abdominal exam is benign.  Labs here reassuring.  Patient also reported vaginal discharge but has no signs of cervicitis or PID on exam performed by my previous colleague.  STD testing collected.  Trichomonas negative.  She does have evidence of bacterial vaginosis.  Plan to DC home with Flagyl.  Patient advised to follow up with her primary care doctor for the results of her gonorrhea and chlamydia testing.  At time of changeover, plan to re-evaluate and p.o. trial.  Anticipate discharge home.    On re-evaluation, patient tolerating p.o. intake well.  Will send home with Flagyl for BV.  Will also send home with Zofran for nausea and vomiting.  Suspect that her symptoms are viral in etiology.  Patient counseled on return precautions and advised to follow up with the primary care doctor.

## 2024-06-11 NOTE — ED NOTES
Patient states she threw up last night x 1 , diarrhea x2 yesterday, states she would like to be treated for tricih that she did not get rx filled in January

## 2024-06-11 NOTE — FIRST PROVIDER EVALUATION
Medical screening examination initiated.  I have conducted a focused provider triage encounter, findings are as follows:    Brief history of present illness:  22 yo F w/ n/v/d since last night. No abdominal pain at this time. C/o weakness    There were no vitals filed for this visit.    Pertinent physical exam:  nontoxic, NAD    Brief workup plan:  cbc, cmp, UPT, zofran odt    Preliminary workup initiated; this workup will be continued and followed by the physician or advanced practice provider that is assigned to the patient when roomed.

## 2025-02-05 ENCOUNTER — HOSPITAL ENCOUNTER (EMERGENCY)
Facility: HOSPITAL | Age: 25
Discharge: HOME | End: 2025-02-06
Attending: EMERGENCY MEDICINE

## 2025-02-05 DIAGNOSIS — J06.9 URI WITH COUGH AND CONGESTION: ICD-10-CM

## 2025-02-05 DIAGNOSIS — Z32.01 POSITIVE URINE PREGNANCY TEST: Primary | ICD-10-CM

## 2025-02-05 LAB
B-HCG UR QL: POSITIVE
CTP QC/QA: YES

## 2025-02-05 PROCEDURE — 0241U SARS-COV2 (COVID) WITH FLU/RSV BY PCR: CPT | Performed by: PHYSICIAN ASSISTANT

## 2025-02-05 PROCEDURE — 99283 EMERGENCY DEPT VISIT LOW MDM: CPT

## 2025-02-05 PROCEDURE — 81025 URINE PREGNANCY TEST: CPT | Performed by: PHYSICIAN ASSISTANT

## 2025-02-05 NOTE — Clinical Note
"Antonio Jeffreykady Holt was seen and treated in our emergency department on 2/5/2025.  She may return to work on 02/08/2025.       If you have any questions or concerns, please don't hesitate to call.       LPN    "

## 2025-02-06 VITALS
HEIGHT: 65 IN | RESPIRATION RATE: 16 BRPM | TEMPERATURE: 98 F | DIASTOLIC BLOOD PRESSURE: 71 MMHG | WEIGHT: 150 LBS | BODY MASS INDEX: 24.99 KG/M2 | SYSTOLIC BLOOD PRESSURE: 112 MMHG | HEART RATE: 57 BPM | OXYGEN SATURATION: 98 %

## 2025-02-06 LAB
INFLUENZA A, MOLECULAR: NOT DETECTED
INFLUENZA B, MOLECULAR: NOT DETECTED
RSV AG BY MOLECULAR METHOD: NOT DETECTED
SARS-COV-2 RNA RESP QL NAA+PROBE: NOT DETECTED

## 2025-02-06 NOTE — ED TRIAGE NOTES
Pt states that she has been waking up for 3 weeks now coughing and then throwing up from coughing. She states she has no nausea or diarrhea only throws up when she is coughing. Only other symptom is a scratchy throat.

## 2025-02-06 NOTE — ED PROVIDER NOTES
Encounter Date: 2025       History     Chief Complaint   Patient presents with    Fatigue     Pt states cough, congestion, fatigue, nausea, vomiting x1 week     The patient is a 24 year old female. She is a  SAB1. She denies any past medical history. She presents to the ER c/o acute cold and flu symptoms x 1 week. She reports coughing, nasal congestion, nausea, and fatigue. She also states that she is uncertain when her last period occurred and states that she may be pregnant. She is sexually active and not using any form of birth control. She has not taken a pregnancy test. She denies any abdominal pain, pelvic pain, or any vaginal bleeding. No additional symptoms reported. No pre-arrival treatment attempted. She is requesting a work note.         Review of patient's allergies indicates:  No Known Allergies  History reviewed. No pertinent past medical history.  History reviewed. No pertinent surgical history.  Family History   Problem Relation Name Age of Onset    No Known Problems Mother Evans     No Known Problems Father Kevin     No Known Problems Sister Reyna     No Known Problems Sister Mendez     No Known Problems Brother Kevin Rod     No Known Problems Brother Daryn     Early death Son Terry     SIDS Son Terry     No Known Problems Maternal Aunt Lanie     No Known Problems Maternal Uncle Ciaran     No Known Problems Paternal Aunt Marley     Drug abuse Maternal Grandmother Latoya     Alcohol abuse Maternal Grandmother Latoya     Rectal cancer Maternal Grandmother Latoya     No Known Problems Maternal Grandfather Precious     No Known Problems Paternal Grandmother Maya     No Known Problems Paternal Grandfather Kevin     Breast cancer Neg Hx      Colon cancer Neg Hx      Ovarian cancer Neg Hx       Social History     Tobacco Use    Smoking status: Never    Smokeless tobacco: Never   Substance Use Topics    Alcohol use: Not Currently    Drug use: Not Currently     Types: Marijuana     Review of Systems    Constitutional:  Positive for fatigue. Negative for activity change, chills, diaphoresis and fever.   HENT:  Positive for congestion and rhinorrhea. Negative for sore throat.    Respiratory:  Positive for cough. Negative for chest tightness, shortness of breath and wheezing.    Cardiovascular:  Negative for chest pain, palpitations and leg swelling.   Gastrointestinal:  Positive for nausea. Negative for abdominal distention, abdominal pain, constipation, diarrhea and vomiting.   Genitourinary:  Positive for menstrual problem. Negative for decreased urine volume, difficulty urinating, dysuria, flank pain, frequency, pelvic pain, vaginal bleeding and vaginal discharge.   Musculoskeletal:  Negative for back pain, gait problem, neck pain and neck stiffness.   Skin:  Negative for color change, rash and wound.   Allergic/Immunologic: Negative for immunocompromised state.   Neurological:  Negative for dizziness, seizures, syncope, speech difficulty, weakness, light-headedness, numbness and headaches.   Psychiatric/Behavioral:  Negative for confusion.        Physical Exam     Initial Vitals [02/05/25 2040]   BP Pulse Resp Temp SpO2   123/85 72 18 98.7 °F (37.1 °C) 100 %      MAP       --         Physical Exam    Nursing note and vitals reviewed.  Constitutional: She appears well-developed and well-nourished. She is not diaphoretic. No distress.   HENT:   Head: Normocephalic. Mouth/Throat: Oropharynx is clear and moist. No oropharyngeal exudate.   Eyes: Conjunctivae are normal.   Neck: Neck supple.   Cardiovascular:  Normal rate.           Pulmonary/Chest: No respiratory distress. She has no wheezes.   Abdominal: Abdomen is soft. She exhibits no distension. There is no abdominal tenderness. There is no rebound and no guarding.   Musculoskeletal:         General: No tenderness or edema. Normal range of motion.      Cervical back: Neck supple.     Neurological: She is alert and oriented to person, place, and time. She has  normal strength. No sensory deficit.   Skin: Skin is warm and dry. No rash noted.   Psychiatric: She has a normal mood and affect. Her behavior is normal.         ED Course   Procedures  Labs Reviewed   POCT URINE PREGNANCY - Abnormal       Result Value    POC Preg Test, Ur Positive (*)      Acceptable Yes     SARS-COV2 (COVID) WITH FLU/RSV BY PCR    SARS-CoV2 (COVID-19) Qualitative PCR Not Detected      Influenza A, Molecular Not Detected      Influenza B, Molecular Not Detected      RSV Ag by Molecular Method Not Detected     HEPATITIS C ANTIBODY   HIV 1 / 2 ANTIBODY          Imaging Results    None          Medications - No data to display  Medical Decision Making  Amount and/or Complexity of Data Reviewed  Labs: ordered.                          Medical Decision Making:   History:   Old Medical Records: I decided to obtain old medical records.  Initial Assessment:   25 yo F, here c/o cough, congestion, nausea, and fatigue x 1 week.   UPT positive in intake. Denies any pain or bleeding.   Differential Diagnosis:   New pregnancy, covid, influenza, URI, etc   Clinical Tests:   Lab Tests: Ordered and Reviewed  ED Management:  Vital signs reviewed - in normal range - benign   UPT positive  Benign abdominal exam   Pt denies any abdominal pain, pelvic, pain, vaginal bleeding/spotting, or discharge   Covid negative   Influenza negative   Ambulatory referral to OB order placed; patient advised to follow up with [facility] this week for pre- care   Pt advised to rest, hydrate and to take Tylenol as directed as needed   Patient advised to return to the ER if worse in any way              Clinical Impression:  Final diagnoses:  [Z32.01] Positive urine pregnancy test (Primary)  [J06.9] URI with cough and congestion          ED Disposition Condition    Discharge Stable          ED Prescriptions    None       Follow-up Information       Follow up With Specialties Details Why Contact Info    [facility]  OB/GYN Obstetrics and Gynecology Schedule an appointment as soon as possible for a visit  Follow up with [facility] for pre- care. Rest, hydrate, take Tylenol as directed as needed. [facility] [facility] [address] [phone]    [facility] - Emergency Dept Emergency Medicine  Return to the ER if worse in any way. [address]  [facility] [address] [phone]             Alex Pike PA-C  25 0059

## 2025-02-06 NOTE — FIRST PROVIDER EVALUATION
Emergency Department TeleTriage Encounter Note      CHIEF COMPLAINT    Chief Complaint   Patient presents with    Fatigue     Pt states cough, congestion, fatigue, nausea, vomiting x1 week       VITAL SIGNS   Initial Vitals [02/05/25 2040]   BP Pulse Resp Temp SpO2   123/85 72 18 98.7 °F (37.1 °C) 100 %      MAP       --            ALLERGIES    Review of patient's allergies indicates:  No Known Allergies    PROVIDER TRIAGE NOTE  This is a teletriage evaluation of a 24 y.o. female presenting to the ED complaining of worsening cough over the last several days.     Initial orders will be placed and care will be transferred to an alternate provider when patient is roomed for a full evaluation. Any additional orders and the final disposition will be determined by that provider.         ORDERS  Labs Reviewed   HEPATITIS C ANTIBODY   HIV 1 / 2 ANTIBODY   SARS-COV2 (COVID) WITH FLU/RSV BY PCR   POCT URINE PREGNANCY       ED Orders (720h ago, onward)      Start Ordered     Status Ordering Provider    02/05/25 2218 02/05/25 2217  X-Ray Chest PA And Lateral  1 time imaging         Acknowledged NATHAN BHATT    02/05/25 2218 02/05/25 2217  POCT urine pregnancy  Once         Acknowledged NATHAN BHATT    02/05/25 2218 02/05/25 2217  SARS-Cov2 (COVID) with FLU/RSV by PCR  Once         Acknowledged NATHAN BHATT    02/05/25 2043 02/05/25 2042  Hepatitis C Antibody  STAT         Acknowledged AYAD FLOREZ    02/05/25 2043 02/05/25 2042  HIV 1/2 Ag/Ab (4th Gen)  STAT         Acknowledged AYAD FLOREZ              Virtual Visit Note: The provider triage portion of this emergency department evaluation and documentation was performed via [employer], a HIPAA-compliant telemedicine application, in concert with a tele-presenter in the room. A face to face patient evaluation with one of my colleagues will occur once the patient is placed in an emergency department  room.      DISCLAIMER: This note was prepared with [employer] voice recognition transcription software. Garbled syntax, mangled pronouns, and other bizarre constructions may be attributed to that software system.

## 2025-02-17 ENCOUNTER — CLINICAL SUPPORT (OUTPATIENT)
Dept: OBSTETRICS AND GYNECOLOGY | Facility: CLINIC | Age: 25
End: 2025-02-17

## 2025-02-17 ENCOUNTER — PATIENT MESSAGE (OUTPATIENT)
Dept: OBSTETRICS AND GYNECOLOGY | Facility: CLINIC | Age: 25
End: 2025-02-17

## 2025-02-17 DIAGNOSIS — N91.2 AMENORRHEA: Primary | ICD-10-CM

## 2025-02-17 PROCEDURE — 99212 OFFICE O/P EST SF 10 MIN: CPT | Mod: PBBFAC

## 2025-04-07 ENCOUNTER — PATIENT MESSAGE (OUTPATIENT)
Dept: OBSTETRICS AND GYNECOLOGY | Facility: CLINIC | Age: 25
End: 2025-04-07
Payer: MEDICAID

## 2025-04-07 DIAGNOSIS — Z32.01 POSITIVE PREGNANCY TEST: Primary | ICD-10-CM

## 2025-04-10 ENCOUNTER — TELEPHONE (OUTPATIENT)
Dept: OBSTETRICS AND GYNECOLOGY | Facility: CLINIC | Age: 25
End: 2025-04-10
Payer: MEDICAID

## 2025-04-11 ENCOUNTER — TELEPHONE (OUTPATIENT)
Dept: MATERNAL FETAL MEDICINE | Facility: CLINIC | Age: 25
End: 2025-04-11
Payer: MEDICAID

## 2025-04-11 ENCOUNTER — PROCEDURE VISIT (OUTPATIENT)
Dept: MATERNAL FETAL MEDICINE | Facility: CLINIC | Age: 25
End: 2025-04-11
Payer: MEDICAID

## 2025-04-11 ENCOUNTER — OFFICE VISIT (OUTPATIENT)
Dept: OBSTETRICS AND GYNECOLOGY | Facility: CLINIC | Age: 25
End: 2025-04-11
Payer: MEDICAID

## 2025-04-11 VITALS
WEIGHT: 161.38 LBS | BODY MASS INDEX: 26.89 KG/M2 | SYSTOLIC BLOOD PRESSURE: 103 MMHG | DIASTOLIC BLOOD PRESSURE: 66 MMHG | HEIGHT: 65 IN

## 2025-04-11 DIAGNOSIS — Z32.01 POSITIVE PREGNANCY TEST: Primary | ICD-10-CM

## 2025-04-11 DIAGNOSIS — Z32.01 POSITIVE PREGNANCY TEST: ICD-10-CM

## 2025-04-11 DIAGNOSIS — N91.4 SECONDARY OLIGOMENORRHEA: ICD-10-CM

## 2025-04-11 PROCEDURE — 99999 PR PBB SHADOW E&M-EST. PATIENT-LVL III: CPT | Mod: PBBFAC,,, | Performed by: FAMILY MEDICINE

## 2025-04-11 PROCEDURE — 99213 OFFICE O/P EST LOW 20 MIN: CPT | Mod: PBBFAC,TH | Performed by: FAMILY MEDICINE

## 2025-04-11 PROCEDURE — 76805 OB US >/= 14 WKS SNGL FETUS: CPT | Mod: PBBFAC | Performed by: OBSTETRICS & GYNECOLOGY

## 2025-04-11 PROCEDURE — 87086 URINE CULTURE/COLONY COUNT: CPT | Performed by: FAMILY MEDICINE

## 2025-04-11 NOTE — PATIENT INSTRUCTIONS
LABOR AND DELIVERY PHONE NUMBER, [phone] (OPEN , LOCATED ON 6TH FLOOR OF HOSPITAL)  [address] PHONE NUMBER, [phone] (OPEN MON-FRI, 8a-5p)     1) Eat small frequent meals through the day versus three large meals  2) Try ginger ale or sprite to help settle the stomach - you can also take ginger capsules (250mg 4 times per day)  3) Eat crackers or dry toast before getting out of bed in the morning   4) Stay hydrated by drinking plenty of water-do not immediately eat or drink something after vomiting. Give your stomach a rest for 20-30 minutes. Slowly reintroduce ice chips, small sips water, crackers, etc.    5) Try OTC unisom (1/2 tablet) and vitamin B6 - take the 25mg b6 twice a day and then both together at night before bed. This can help with the nausea in the morning and is safe to use during pregnancy.  6) Sea bands (Accupressure wrist bands)    If you are unable to keep anything down and constantly vomiting for more that 24 hours, let the office know so that dehydration can be avoided. We would recommend being seen in the emergency department if this is the case.       Topic  General Pregnancy Information Recommended   (Unless Otherwise Contraindicated Or Restrictions Given To You By Your OB Doctor)      1. Anticipated course of prenatal care      Visits: will be Every 4 wks until 28 weeks, then every 2 weeks until 36 weeks, and then weekly until delivery.   Urine will be collected at each Obstetric visit        2. Nutrition and weight gain    Daily pre-selena vitamin (recommend taking at night)   Additional 300 calories needed daily  No Sushi, hotdogs, unpasteurized products (milk/cheeses). No large fish such as: shark, meron mackerel, tile, sword fish   Incorporate 12 ounces of smaller seafood/week and no more than 6oz of albacore tuna   Caffeine: 200 mg/day or 2 cups of caffeine/day   Weight gain recommendations are based off of BMI before pregnancy. Generally patients who with normal weight prior  pregnancy it is recommended 25-35 pounds of weight gain during the pregnancy with an estimated weekly gain of 1 pound/wk in 2nd and 3rd Trimester.    3. Toxoplasmosis precautions  If cats are in the home avoid changing litter boxes and if you need to change the litter box recommended you use gloves   4. Sexual Activity  Sexual activity is okay unless you are put on restrictions by your provider. I recommend urinating after intercourse    5. Exercise  Generally pre-pregnancy routine is okay to continue   Drink plenty fluids for hydration   Stop any activity that causes heavy cramping like a period or bright red bleeding and contact your provider  No extreme or contact sports   No exercise on your back for an extended period of time after 20 weeks    6. Hot Tub/Saunas  Avoid hot tubes and saunas    7. Hair Treatments  Because of the lack of scientific studies on the effects of chemical treatments on your hair, we must advise that you do it at your own risk. If you choose to treat your hair, we recommend that you wait until after 12 weeks gestation. At this time there is no reason to believe that normal hair treatment is associated with onsequences to the baby.    8. Vaccines  Influenza vaccine is recommended by CDC during flu season   Tdap (pertussis or whopping cough) recommended each pregnancy between 27 and 36 weeks   Tdap booster recommended for family and other planned direct caregivers    9. Water  Water is an important nutrient in a good diet. However, it cannot be stressed enough that during pregnancy water is essential. The body has increased circulation through blood vessels, and without a large increase in water, pregnant women will be dehydrated. It plays an important role in decreasing constipation, preventing  contractions, decreasing swelling, and preventing dizziness. We recommend that you drink 8-10 glasses of water per day.    10. Smoking/Alcohol/Illicit Drug Use  No safe Level   Can lead to  problems with pregnancy   Growth of the developing fetus    labor (delivery before 37 weeks)    rupture of the membranes (water breaking before 37 weeks)   Premature separation of the placenta (which may cause bleeding)   American College of Obstetricians and Gynecologists endorses abstinence   Can lead to babies with disabilities    11. Environmental or work hazards  Unless otherwise restricted you may continue work throughout the pregnancy   Notify your provider of any work hazards or chemical exposure concerns   12. Travel    Safe to travel up to 35 weeks   Continue to wear a seatbelt and airbags are still recommended   Drink plenty fluids   Blood clots are a concern during pregnancy with long travel. Recommend compression stockings and moving around at least every 2 hours and staying hydrated.    13. Use of medications, vitamins, herbs, OTC drugs    Any medications not on the list provided to you from our clinic or given to you by one of our providers we recommend calling to make sure the medication is safe for you and baby.    14. Domestic Violence    Please notify office immediately of any concerns or violence so that we can help direct you to assistance needed   [place] [employer]: [phone]    15. Childbirth classes    List of Childbirth classes from [facility] is available    16. Selecting a Pediatrician  Selecting a pediatrician before delivery is recommended  You can interview pediatricians before delivery    17. Fetal Monitoring    A simple test of your babys well-being is a kick count. After 26 weeks, fetal motion of any kind should be monitored. Further discussion at that time   18.  Labor Signs    Water break, leaking fluids from Vagina prior 37 weeks  Regular contractions, Contractions that are more than 5-6/hour, getting stronger and painful with lower back pain, does not go away with rest and fluids    19. Postpartum Family Planning    Multiple  options available from short term methods to long term reversible and irreversible methods   Discuss with provider as you get closer to delivery    20. Dental    It is recommended that you get an annual dental cleaning    21. Breastfeeding    Classes offered at [facility] and it is recommended to take a class    22. Lifting In 2013, the National [place] for Occupational Safety and Health (NIOSH) published clinical guidelines for occupational lifting in uncomplicated pregnancies. The recommended weight limits are based on gestational age, intermittent versus repetitive lifting, time (hours/day) spent lifting, and lifting height from floor and distance in 3 front of body. In this guideline, the maximum permissible weight for a woman less than 20 weeks of gestation performing infrequent lifting is 36 pounds (16 kgs) and the maximum permissible weight at >=20 weeks is 26 pounds (12 kgs). For repetitive lifting >=1 hour/day, the maximum weights in the first and second half of pregnancy are 18 pounds (8 kgs) and 13 pounds (6 kgs), respectively, and for repetitive lifting <1 hour/day, the maximum weights are 30 pounds (14 kgs) and 22 pounds (10 kgs), respectively. Although not based on high quality evidence, these guidelines are a reasonable reference for counseling pregnant women     23. Scheduling and Provider Availability    Your Obstetric Doctor is usually here weekly but not every day. We recommend you make 3-4 advanced appointments at a time to accommodate your personal needs and work/school obligations.   We ask that you come 15 minutes prior your scheduled appointment.   For same day appointments (not routine appointments) there is a Nurse Practitioner or another obstetric provider available. Please let the  aware you are an OB patient requesting a same day appointment.      24. Recommended Phone Eve    [facility]      MEDICATIONS IN PREGNANCY     While some medications are considered safe to take  during pregnancy, the effects of other medications on your unborn baby are unknown. Therefore, it is very important to pay special attention to medications you take while you are pregnant.   If you were taking prescription medications before you became pregnant, please ask your health care provider about continuing these medications as soon as you find out that you are pregnant. Do not stop taking any medications without discussing with your health care provider. Your health care provider will weigh the benefits and risks to your pregnancy when making his or her recommendation. With some medications, the risk of not taking them may be more serious than the potential risk of taking them.   If you are prescribed any new medication, please inform your health care provider that you are pregnant. Be sure to discuss the risks and benefits of the newly prescribed medication with your health care provider before taking the medication. We are always available to answer any questions about new medications and how they relate to your pregnancy.     Are Alternative Pregnancy Medicine Therapies Safe?   Many pregnant women believe natural products can be safely used to relieve nausea, backache, and other annoying symptoms of pregnancy, but many of these so-called natural products have not been tested for their safety and effectiveness. Therefore, it is very important to check with your health care provider before taking any alternative therapies. She will not recommend a product or therapy until it is shown to be safe and effective.     Which Over the Counter Drugs Are Safe?   Prenatal vitamins, now available without a prescription, are safe and important to take during pregnancy. Ask your health care provider about the safety of taking other vitamins, herbal remedies and supplements during pregnancy. Most herbal preparations and supplements have not been proven to be safe during pregnancy. Generally, you should not take any  over-the-counter medication unless it is necessary. The following medications and home remedies have no known harmful effects during pregnancy when taken according to the package directions. If you want to know about the safety of any other medications not listed here, please contact your health care provider.      Problem:  Safe to Take:    Pain relief, headache, and fever  Acetaminophen - Tylenol, Anacin Aspirin-Free    Heartburn  Acid neutralizers - Maalox, Mylanta, Rolaids, Tums, Gaviscon   Histamine-blockers - Pepcid, Zantac, Prilosec    Gas pains and bloating  Simethicone - Gas-X, Maalox Anti-Gas, Mylanta Gas, Mylicon    Nausea  Jennifer - beverages, tablets, candies   Vitamin B6   Emetrol (if not diabetic)   Sea bands   Anti-histamines - Sleep-heather, Benadryl, Bonnine, Dramamine    Cough  Guaifenesin (expectorant) - Hytuss, Mucinex, Robitussin   Dextromethorphan (antitussive) - Benylin, Delsym, Scot-Tussin DM   Guaifenesin plus dextromethorphan - Benylin Expectorant, Robitussin DM    Congestion  Pseudoephedrine - Sudafed, Actifed, Dristan, Neosynephrine   Vicks VapoRub   Saline nasal drops or spray    Sore throat  Throat lozenges - Sucrets, Cepacol, Cepastat, Ricola   Chloroseptic Spray   Warm salt/water gargle    Allergy relief  Chlorpheniramine - Chlor-Trimeton, Triaminic   Loratadine - Alavert, Claritin, Tavist ND, Triaminic Allerchews   Cetirizine - Zyrtec   Diphenhydramine -Benadryl, Diphenhist    Rashes  Hydrocortisone cream or ointment   Caladryl lotion or cream   Benadryl cream   Oatmeal bath (Aveeno)    Diarrhea  Loperamide - Imodium, Kaopectate, Maalox Anti-Diarrheal, Pepto Bismol    Constipation  Fiber supplements - Metamucil, Citrucel, Fiberall/Fibercon, Benefiber   Stool softeners - Colace, Senekot, Dulcolax   Milk of Magnesia    Hemorrhoids  Warm baths   Witch hazel preparations - Tucks medicated pads   Steroid preparations - Anusol-HC, Preparation H    Insomnia  Diphenhydramine - Benadryl, Unisom  SleepGels, Nytol, Sominex   Doxylamine succinate - Unisom Nighttime Sleep-Aid    First-aid ointments  Cortaid, Lanacort, Polysporin, Bacitracin, Neosporin    Yeast infections  Call office for appointment      Connected MOM   Using Wireless Technology to Manage Your Prenatal Health   Congratulations! Your team here at [facility] is excited for the upcoming addition to your family and is ready to support you over the course of your pregnancy. One of the ways we are prepared to help you is through our exciting new Digital Medicine Program, Connected MOM. Connected MOM stands for Connected Maternity Online Monitoring and is offered free of charge as a way to help our expectant mothers manage their pregnancy with fewer visits to the obstetrician.    In the fast-paced environment we live in, patients demand convenient, reliable access to healthcare at their fingertips. Recommended by The American College of Obstetricians and Gynecologists (ACOG), the standard prenatal care model for low-risk pregnancies can average anywhere between 12-14 in-office prenatal visits.    Through this innovative program, participating mothers-to-be receive a wireless scale, wireless blood pressure cuff and an at-home urine protein test kit. Through the use of a smartphone, patients can easily send their weight, blood pressure readings and urine protein test results digitally in real-time from the comfort of their own homes. Results are sent directly to their [facility] account, the systems online patient portal which connects directly to the patients Electronic Medical Record. A specialized Connected MOM care team - comprised of the patients obstetrician, a dedicated health  and a  - reviews the data and provides medical recommendations as needed. This allows expectant mothers to receive care proactively, wherever they are, while minimizing the need for in-person visits and thus minimizing  disruption to their regular daily activities.    How it Works At the initial prenatal visit, interested patients can work with their obstetrician to sign up for the program. After the appointment, expectant mothers can head to the [employer], a first-of-its-kind retail experience created by [facility] offering the latest in cutting-edge, interactive healthcare technology, to receive a Connected MOM kit containing all of the digital tools needed for remote monitoring. Once enrolled, patients weigh themselves regularly and take their blood pressure once a week. Instead of coming to three office appointments at weeks 20, 30, 37 the patient will have the appointment at home. They will take their blood pressure, weight and perform three at-home urine protein tests at these home visits. Results are directly transmitted into the EMR, and notifications and messages from the care team are communicated via [facility].     TECH SUPPORT [phone]  [email or web address]      Chromosomal testing  The sequential screen is a test done around 11-13 weeks that consists of two sets of blood work (second set of labs done a few weeks later after 15 weeks). Based on both of these results, they are able to tell you if you are considered to be low risk or high risk regarding neural tube defects and chromosomal abnormalities like Down Syndrome and Trisomy 18. If you are at all interested, I usually place the order today so we do not miss the window. This test is typically covered by your insurance. It will not tell you the sex of the baby.     OR     2.  Call [phone] to see about coverage and any out of pocket costs regarding the [id number] (MT21) testing. This is done any day after 10 weeks and is blood work only. It checks for any chromosomal abnormalities like Down Syndrome. You can also find out the sex of the baby if you choose to know. Once you find out coverage and decide to proceed, send either myself or your doctor a  message and we can see what date you can do it. It is done at our second floor lab at [facility].

## 2025-04-11 NOTE — PROGRESS NOTES
HISTORY OF PRESENT ILLNESS:    Antonio Holt is a 24 y.o. female, ,  Patient's last menstrual period was 2024.  for a routine exam complaining of amenorrhea and positive home UPT.   but baby unfortunately passed from SIDS not long after birth. Same partner who is healthy. NV still 1-2 times per day, declined zofran. +FM, no vb/lof/ctx. No abn pap. Hx of trich. Works in customer service. This is the extent of the patient's complaints at this time.     History reviewed. No pertinent past medical history.    History reviewed. No pertinent surgical history.    MEDICATIONS AND ALLERGIES:    Current Medications[1]    Review of patient's allergies indicates:  No Known Allergies    Family History   Problem Relation Name Age of Onset    Diabetes Mother Evans     No Known Problems Father Kevin     No Known Problems Sister Reyna     No Known Problems Sister Mendez     No Known Problems Brother Kevin Jr     No Known Problems Brother Daryn     Drug abuse Maternal Grandmother Latoya     Alcohol abuse Maternal Grandmother Latoya     Rectal cancer Maternal Grandmother Latoya     No Known Problems Maternal Grandfather Precious     No Known Problems Paternal Grandmother Maya     No Known Problems Paternal Grandfather Kevin     Early death Son Terry     SIDS Son Terry     No Known Problems Maternal Aunt Lanie     No Known Problems Maternal Uncle Ciaran     No Known Problems Paternal Aunt Marley     Breast cancer Neg Hx      Colon cancer Neg Hx      Ovarian cancer Neg Hx         Social History[2]    COMPREHENSIVE GYN HISTORY:  PAP History: Denies abnormal Paps.  Infection History: + STDs. Denies PID.  Benign History: Denies uterine fibroids. Denies ovarian cysts. Denies endometriosis. Denies other conditions.  Cancer History: Denies cervical cancer. Denies uterine cancer or hyperplasia. Denies ovarian cancer. Denies vulvar cancer or pre-cancer. Denies vaginal cancer or pre-cancer. Denies breast cancer. Denies  "colon cancer.  Sexual Activity History: Reports currently being sexually active  Menstrual History: None.  Contraception: None    ROS:  GENERAL: No weight changes. No swelling. No fatigue. No fever.  CARDIOVASCULAR: No chest pain. No shortness of breath. No leg cramps.   NEUROLOGICAL: No headaches. No vision changes.  BREASTS: No pain. No lumps. No discharge.  ABDOMEN: No pain. + nausea. + vomiting. No diarrhea. No constipation.  REPRODUCTIVE: No abnormal bleeding. No pelvic pain  VULVA: No pain. No lesions. No itching.  VAGINA: No relaxation. No itching. No odor. No discharge. No lesions.  URINARY: No incontinence. No nocturia. No frequency. No dysuria.    /66   Ht 5' 5" (1.651 m)   Wt 73.2 kg (161 lb 6 oz)   LMP 12/19/2024   BMI 26.85 kg/m²     PE:  Physical Exam:   Constitutional: She appears well-developed and well-nourished. She does not appear ill. No distress.        Pulmonary/Chest: Right breast exhibits no inverted nipple, no mass, no nipple discharge, no skin change, no tenderness and no swelling. Left breast exhibits no inverted nipple, no mass, no nipple discharge, no skin change, no tenderness and no swelling.          Genitourinary:    Urethra normal.      Pelvic exam was performed with patient in the lithotomy position.   The external female genitalia was normal.   Genitalia hair distrobution normal .   There is no rash or lesion on the right labia. There is no rash or lesion on the left labia. Cervix is normal. There is vaginal discharge (thin off white no odor) in the vagina. No rectocele, cystocele or prolapse of vaginal walls in the vagina. Uterus is enlarged. Uterus is not tender. Normal urethral meatus.   Genitourinary Comments: Renee chaperone                 Neurological: GCS eye subscore is 4. GCS verbal subscore is 5. GCS motor subscore is 6.         PROCEDURES:  UPT Positive  Genprobe  Pap- 2023 NL      DIAGNOSIS:  Gyn exam  IUP with stated LMP of Patient's last menstrual period " was 2024.    Indication   ========   Indication: Estimation of Gestational Age   Indication: Fetal Anatomic Survey     History   ======   Previous Outcomes    2   Para 1   Living children (L) 0   Risk Factors   Details: prior child passed away at 1 month (SIDS)     Method   ======   [place] E10, Transabdominal ultrasound examination, 2D Color Doppler. View: Suboptimal view: limited by fetal position     Pregnancy   =========   Jeffrey pregnancy. Number of fetuses: 1     Dating   ======   Ultrasound examination on: 2025   GA by U/S based upon: AC, BPD, Femur   GA by U/S 18 w + 5 d   VEL by U/S: 2025   Assigned: based on ultrasound (AC, BPD, Femur), selected on 2025   Assigned GA 18 w + 5 d   Assigned VEL: 2025     General Evaluation   ==============   Cardiac activity present.  bpm. Fetal movements: visualized. Presentation: cephalic   Placenta: Placental site: anterior. Placental edge-to-cervical os distance 51 mm. Subchorionic hemorrhage (Size 38.0 mm x 9.0 mm x 36.0 mm)   Umbilical cord: Cord vessels: 3 vessel cord. Insertion site: normal insertion   Amniotic fluid: Amount of AF: normal amount     Fetal Biometry   ============   Standard   BPD 41.3 mm 18w 4d Hadlock   OFD 54.9 mm 19w 3d Keyla   .6 mm -/- [place]   Cerebellum tr 18.8 mm 19w 0d Medrano   Nuchal fold 3.6 mm   .0 mm 18w 4d Hadlock   Femur 29.0 mm 18w 6d Hadlock   Humerus 27.3 mm 18w 5d Keyla   HC / AC 1.18    g -/- Tim   EFW (lb) 0 lb   EFW (oz) 9 oz   EFW by: Hadlock (BPD-HC-AC-FL)   Extended    6.1 mm   CM 3.2 mm   Head / Face / Neck   Cephalic index 0.75   Extremities / Bony Struc   FL / BPD 0.70   FL / HC 0.19   FL / AC 0.22   Other Structures    bpm     Fetal Anatomy   ===========   Cranium: normal   Lateral ventricles: normal   Choroid plexus: normal   Midline falx: normal   Cavum septi pellucidi: suboptimal   Cerebellum: normal   Cisterna magna: normal   Head /  Neck   Nuchal fold: normal   Lips: normal   Profile: suboptimal   Nose: normal   Face   Orbits: both visualized   Lens: both visualized   4-chamber view: normal   RVOT view: suboptimal   LVOT view: suboptimal   3-vessel view: normal   3-vessel-trachea view: suboptimal   Heart / Thorax   Situs: situs solitus (normal)   Diaphragm: suboptimal   Cord insertion: normal   Stomach: normal   Kidneys: normal   Bladder: normal   Genitals: normal   Cervical spine: normal   Thoracic spine: normal   Lumbar spine: suboptimal   Sacral spine: suboptimal   Spine: Suboptimal transverse   Rt arm: normal   Lt arm: normal   Rt leg: normal   Lt leg: normal   Position of hands: normal   Position of feet: normal   Wants to know fetal sex: no     Maternal Structures   ===============   Uterus / Cervix   Uterus: Normal   Cervix: Visualized   Approach: Transabdominal   Cervical length 47.0 mm   Ovaries / Tubes / Adnexa   Rt ovary: Visualized   Rt ovary D1 37 mm   Rt ovary D2 21 mm   Rt ovary D3 16 mm   Rt ovary Vol 6.7 cmï¿½   Lt ovary: Visualized   Lt ovary D1 26 mm   Lt ovary D2 26 mm   Lt ovary D3 14 mm   Lt ovary Vol 4.8 cmï¿½     Impression   =========   A yost living IUP is identified.   Fetal size is appropriate for established dating.   No fetal structural malformations are identified; however, the anatomic survey is incomplete as noted above. The patient will be scheduled for a f/u exam to complete the   anatomic survey.   Cervical length by TA scanning is normal.   Placental location is anterior without evidence of previa.   A subchorionic hematoma is noted.     Limitations   =========   Please note: Prenatal ultrasound studies have limitations. They do not detect all fetal, genetic, placental, and maternal abnormalities. A normal appearing prenatal   ultrasound is reassuring. However, it does not guarantee the absence of an abnormality or predict a normal outcome for the fetus or the mother.                                                        Sonographer: Valerie Das   Electronically Signed by: Sherley Ching M.D. at 2025-09:37       PLAN:Routine prenatal care    MEDICATIONS PRESCRIBED:  PNV    LABS AND TESTS ORDERED:  New Ob Labs      1st TRIMESTER COUNSELING: Discussed all, booklet provided  Common complaints of pregnancy  HIV and other routine prenatal tests including  genetic screening  Risk factors identified by prenatal history  Anticipated course of prenatal care  Nutrition and weight gain counseling  Toxoplasmosis precautions (Cats/Raw Meat)  Sexual activity and exercise  Environmental/Work hazards  Travel  Tobacco (Ask, Advise, Assess, Assist, and Arrange), as well as alcohol and drug use  Use of any medications (Including supplements, Vitamins, Herbs, or OTC Drugs)  Indications for Ultrasound  Domestic violence  Seat belt use  Childbirth classes/Hospital facilities     TERATOLOGY COUNSELING: Discussed options for SS, MT21 and carrier screening. Pt will let us know her desires. Discussed time constraints on SS      FOLLOW-UP for a New Ob Visit in   4   weeks with [facility]  -discussed to call clinic or L&D/ER if after hours for pain/bleeding  -discussed otc meds/tx for NV she will let me know if rx needed         [1]   Current Outpatient Medications:     prenatal vit no.124/iron/folic (PRENATAL VITAMIN ORAL), Take 1 Dose by mouth Daily., Disp: , Rfl:   [2]   Social History  Socioeconomic History    Marital status: Single    Number of children: 0   Tobacco Use    Smoking status: Never    Smokeless tobacco: Never   Substance and Sexual Activity    Alcohol use: Not Currently    Drug use: Not Currently     Types: Marijuana     Comment: last used 1/15/2025    Sexual activity: Yes     Partners: Male     Birth control/protection: None   Other Topics Concern    Patient feels they ought to cut down on drinking/drug use No    Patient annoyed by others criticizing their drinking/drug use No    Patient has felt bad or guilty  about drinking/drug use No    Patient has had a drink/used drugs as an eye opener in the AM No     Social Drivers of Health     Financial Resource Strain: High Risk (10/28/2023)    Overall Financial Resource Strain (CARDIA)     Difficulty of Paying Living Expenses: Hard   Food Insecurity: Food Insecurity Present (10/28/2023)    Hunger Vital Sign     Worried About Running Out of Food in the Last Year: Often true     Ran Out of Food in the Last Year: Never true   Transportation Needs: No Transportation Needs (10/28/2023)    PRAPARE - Transportation     Lack of Transportation (Medical): No     Lack of Transportation (Non-Medical): No   Physical Activity: Inactive (6/30/2023)    Exercise Vital Sign     Days of Exercise per Week: 0 days     Minutes of Exercise per Session: 0 min   Stress: No Stress Concern Present (10/28/2023)    [employer] [employer] - Occupational Stress Questionnaire     Feeling of Stress : Only a little   Housing Stability: High Risk (10/28/2023)    Housing Stability Vital Sign     Unable to Pay for Housing in the Last Year: Yes     Number of Places Lived in the Last Year: 2     Unstable Housing in the Last Year: No

## 2025-04-14 ENCOUNTER — RESULTS FOLLOW-UP (OUTPATIENT)
Dept: OBSTETRICS AND GYNECOLOGY | Facility: CLINIC | Age: 25
End: 2025-04-14

## 2025-04-17 DIAGNOSIS — Z36.2 ENCOUNTER FOR FOLLOW-UP ULTRASOUND OF FETAL ANATOMY: Primary | ICD-10-CM

## 2025-05-02 ENCOUNTER — HOSPITAL ENCOUNTER (EMERGENCY)
Facility: HOSPITAL | Age: 25
Discharge: HOME | End: 2025-05-02
Attending: EMERGENCY MEDICINE
Payer: MEDICAID

## 2025-05-02 VITALS
BODY MASS INDEX: 26.82 KG/M2 | WEIGHT: 161 LBS | HEART RATE: 70 BPM | HEIGHT: 65 IN | RESPIRATION RATE: 16 BRPM | TEMPERATURE: 98 F | SYSTOLIC BLOOD PRESSURE: 106 MMHG | DIASTOLIC BLOOD PRESSURE: 62 MMHG | OXYGEN SATURATION: 99 %

## 2025-05-02 DIAGNOSIS — N76.0 BACTERIAL VAGINOSIS: Primary | ICD-10-CM

## 2025-05-02 DIAGNOSIS — B96.89 BACTERIAL VAGINOSIS: Primary | ICD-10-CM

## 2025-05-02 LAB
ABSOLUTE EOSINOPHIL (OHS): 0.06 K/UL
ABSOLUTE MONOCYTE (OHS): 0.63 K/UL (ref 0.3–1)
ABSOLUTE NEUTROPHIL COUNT (OHS): 6.06 K/UL (ref 1.8–7.7)
ALBUMIN SERPL BCP-MCNC: 2.8 G/DL (ref 3.5–5.2)
ALP SERPL-CCNC: 65 UNIT/L (ref 40–150)
ALT SERPL W/O P-5'-P-CCNC: 6 UNIT/L (ref 10–44)
ANION GAP (OHS): 8 MMOL/L (ref 8–16)
AST SERPL-CCNC: 12 UNIT/L (ref 11–45)
BASOPHILS # BLD AUTO: 0.03 K/UL
BASOPHILS NFR BLD AUTO: 0.3 %
BILIRUB SERPL-MCNC: 0.4 MG/DL (ref 0.1–1)
BILIRUB UR QL STRIP.AUTO: NEGATIVE
BUN SERPL-MCNC: 5 MG/DL (ref 6–20)
C TRACH DNA SPEC QL NAA+PROBE: NOT DETECTED
CALCIUM SERPL-MCNC: 8.5 MG/DL (ref 8.7–10.5)
CHLORIDE SERPL-SCNC: 107 MMOL/L (ref 95–110)
CLARITY UR: CLEAR
CO2 SERPL-SCNC: 22 MMOL/L (ref 23–29)
COLOR UR AUTO: YELLOW
CREAT SERPL-MCNC: 0.5 MG/DL (ref 0.5–1.4)
CTGC SOURCE (OHS) ORD-325: NORMAL
ERYTHROCYTE [DISTWIDTH] IN BLOOD BY AUTOMATED COUNT: 13.2 % (ref 11.5–14.5)
GFR SERPLBLD CREATININE-BSD FMLA CKD-EPI: >60 ML/MIN/1.73/M2
GLUCOSE SERPL-MCNC: 81 MG/DL (ref 70–110)
GLUCOSE UR QL STRIP: NEGATIVE
HCT VFR BLD AUTO: 30.3 % (ref 37–48.5)
HCV AB SERPL QL IA: NORMAL
HGB BLD-MCNC: 10.3 GM/DL (ref 12–16)
HGB UR QL STRIP: NEGATIVE
HIV 1+2 AB+HIV1 P24 AG SERPL QL IA: NORMAL
HOLD SPECIMEN: NORMAL
IMM GRANULOCYTES # BLD AUTO: 0.07 K/UL (ref 0–0.04)
IMM GRANULOCYTES NFR BLD AUTO: 0.8 % (ref 0–0.5)
KETONES UR QL STRIP: NEGATIVE
LEUKOCYTE ESTERASE UR QL STRIP: ABNORMAL
LYMPHOCYTES # BLD AUTO: 2.21 K/UL (ref 1–4.8)
MCH RBC QN AUTO: 28.9 PG (ref 27–31)
MCHC RBC AUTO-ENTMCNC: 34 G/DL (ref 32–36)
MCV RBC AUTO: 85 FL (ref 82–98)
MICROSCOPIC COMMENT: NORMAL
N GONORRHOEA DNA UR QL NAA+PROBE: NOT DETECTED
NITRITE UR QL STRIP: NEGATIVE
NUCLEATED RBC (/100WBC) (OHS): 0 /100 WBC
PH UR STRIP: 7 [PH]
PLATELET # BLD AUTO: 231 K/UL (ref 150–450)
PMV BLD AUTO: 9.8 FL (ref 9.2–12.9)
POTASSIUM SERPL-SCNC: 3.7 MMOL/L (ref 3.5–5.1)
PROT SERPL-MCNC: 6.2 GM/DL (ref 6–8.4)
PROT UR QL STRIP: NEGATIVE
RBC # BLD AUTO: 3.56 M/UL (ref 4–5.4)
RBC #/AREA URNS AUTO: 4 /HPF (ref 0–4)
RELATIVE EOSINOPHIL (OHS): 0.7 %
RELATIVE LYMPHOCYTE (OHS): 24.4 % (ref 18–48)
RELATIVE MONOCYTE (OHS): 7 % (ref 4–15)
RELATIVE NEUTROPHIL (OHS): 66.8 % (ref 38–73)
SODIUM SERPL-SCNC: 137 MMOL/L (ref 136–145)
SP GR UR STRIP: 1.02
SQUAMOUS #/AREA URNS AUTO: 1 /HPF
UROBILINOGEN UR STRIP-ACNC: NEGATIVE EU/DL
WBC # BLD AUTO: 9.06 K/UL (ref 3.9–12.7)
WBC #/AREA URNS AUTO: 4 /HPF (ref 0–5)

## 2025-05-02 PROCEDURE — 81001 URINALYSIS AUTO W/SCOPE: CPT

## 2025-05-02 PROCEDURE — 99283 EMERGENCY DEPT VISIT LOW MDM: CPT

## 2025-05-02 PROCEDURE — 82247 BILIRUBIN TOTAL: CPT

## 2025-05-02 PROCEDURE — 86803 HEPATITIS C AB TEST: CPT | Performed by: PHYSICIAN ASSISTANT

## 2025-05-02 PROCEDURE — 85025 COMPLETE CBC W/AUTO DIFF WBC: CPT

## 2025-05-02 PROCEDURE — 87389 HIV-1 AG W/HIV-1&-2 AB AG IA: CPT | Performed by: PHYSICIAN ASSISTANT

## 2025-05-02 PROCEDURE — 87591 N.GONORRHOEAE DNA AMP PROB: CPT

## 2025-05-02 RX ORDER — METRONIDAZOLE 7.5 MG/G
1 GEL VAGINAL DAILY
Qty: 5 APPLICATOR | Refills: 0 | Status: SHIPPED | OUTPATIENT
Start: 2025-05-02 | End: 2025-05-07

## 2025-05-02 NOTE — DISCHARGE INSTRUCTIONS
Follow-up with your OB as scheduled on 5/7/25.  I am clinically treating you for bacterial vaginosis with topical Flagyl as your results are still pending. If you experiencing worsening symptoms, or develop any abdominal cramping, report to ED for further evaluation.

## 2025-05-02 NOTE — ED PROVIDER NOTES
Encounter Date: 2025       History     Chief Complaint   Patient presents with    Female  Problem     Patient 22 weeks pregnant. Low back cramping since last night and noticed spotting this morning. Denies abdominal cramps.     24-year-old  female who is 21w5d pregnant with no significant medical history who presents to the emergency department complaining of vaginal spotting this morning.  Patient reports waking up and using the restroom, when she wiped she saw a light pink discharge on the toilet paper.  Has not had any bleeding since.  She is not using tampons or pads.  She denies any abdominal pain/cramping, chest pain, leg swelling, nausea/vomiting, shortness of breath, dysuria, urinary urgency, urinary frequency and any other symptoms.  Denies any trauma/recent falls.  Patient is followed by OB, she has an appointment on 25.  Her last ultrasound on 25 was normal.    She reports her last pregnancy was full-term with no complications, however her 1st child  at 1 month due to SIDS.         Review of patient's allergies indicates:  No Known Allergies  History reviewed. No pertinent past medical history.  History reviewed. No pertinent surgical history.  Family History   Problem Relation Name Age of Onset    Diabetes Mother Evans     No Known Problems Father Kevin     No Known Problems Sister Reyna     No Known Problems Sister Mendez     No Known Problems Brother Kevin Jr     No Known Problems Brother Daryn     Drug abuse Maternal Grandmother Latoya     Alcohol abuse Maternal Grandmother Latoya     Rectal cancer Maternal Grandmother Latoya     No Known Problems Maternal Grandfather Precious     No Known Problems Paternal Grandmother Maya     No Known Problems Paternal Grandfather Kevin     Early death Son eTrry     SIDS Son Terry     No Known Problems Maternal Aunt Lanie     No Known Problems Maternal Uncle Ciaran     No Known Problems Paternal Aunt Marley     Breast cancer Neg Hx      Colon  cancer Neg Hx      Ovarian cancer Neg Hx       Social History[1]  Review of Systems    Physical Exam     Initial Vitals [05/02/25 1031]   BP Pulse Resp Temp SpO2   136/64 67 18 97.7 °F (36.5 °C) 98 %      MAP       --         Physical Exam    Nursing note and vitals reviewed.  Constitutional: She appears well-developed and well-nourished.   HENT:   Head: Normocephalic and atraumatic.   Eyes: Conjunctivae and EOM are normal.   Neck: Neck supple.   Normal range of motion.  Cardiovascular:  Normal rate, regular rhythm and normal heart sounds.           Pulmonary/Chest: Breath sounds normal. No respiratory distress. She exhibits no tenderness.   Abdominal: There is no abdominal tenderness.   Gravid uterus, no tenderness   Genitourinary:    Vagina normal.      Pelvic exam was performed with patient supine.   There is no rash, tenderness or lesion on the right labia. There is no rash, tenderness or lesion on the left labia. Cervix exhibits discharge. Cervix exhibits no motion tenderness and no friability.    Genitourinary Comments: Dr. Wise performed pelvic exam:  A female chaperone was present for this examination. Nl external inspection. No lesions or abnormalities were visible on the labia majora or minora. Cervical os is closed. There is no CMT. There is no blood in the vaginal vault. Thin malodorous clear discharge. No adnexal tenderness. No adnexal masses.       Musculoskeletal:         General: No edema. Normal range of motion.      Cervical back: Normal range of motion and neck supple.     Neurological: She is alert and oriented to person, place, and time. She has normal strength.   Skin: Skin is warm and dry. Capillary refill takes less than 2 seconds.   Psychiatric: Her mood appears anxious.         ED Course   Procedures  Labs Reviewed   COMPREHENSIVE METABOLIC PANEL - Abnormal       Result Value    Sodium 137      Potassium 3.7      Chloride 107      CO2 22 (*)     Glucose 81      BUN 5 (*)     Creatinine  0.5      Calcium 8.5 (*)     Protein Total 6.2      Albumin 2.8 (*)     Bilirubin Total 0.4      ALP 65      AST 12      ALT 6 (*)     Anion Gap 8      eGFR >60     URINALYSIS, REFLEX TO URINE CULTURE - Abnormal    Color, UA Yellow      Appearance, UA Clear      pH, UA 7.0      Spec Grav UA 1.020      Protein, UA Negative      Glucose, UA Negative      Ketones, UA Negative      Bilirubin, UA Negative      Blood, UA Negative      Nitrites, UA Negative      Urobilinogen, UA Negative      Leukocyte Esterase, UA Trace (*)    CBC WITH DIFFERENTIAL - Abnormal    WBC 9.06      RBC 3.56 (*)     HGB 10.3 (*)     HCT 30.3 (*)     MCV 85      MCH 28.9      MCHC 34.0      RDW 13.2      Platelet Count 231      MPV 9.8      Nucleated RBC 0      Neut % 66.8      Lymph % 24.4      Mono % 7.0      Eos % 0.7      Basophil % 0.3      Imm Grans % 0.8 (*)     Neut # 6.06      Lymph # 2.21      Mono # 0.63      Eos # 0.06      Baso # 0.03      Imm Grans # 0.07 (*)    HEPATITIS C ANTIBODY - Normal    Hep C Ab Interp Non-Reactive     HIV 1 / 2 ANTIBODY - Normal    HIV 1/2 Ag/Ab Non-Reactive     CBC W/ AUTO DIFFERENTIAL    Narrative:     The following orders were created for panel order CBC auto differential.  Procedure                               Abnormality         Status                     ---------                               -----------         ------                     CBC with Differential[[id number]]       Abnormal            Final result                 Please view results for these tests on the individual orders.   C. TRACHOMATIS/N. GONORRHOEAE BY AMP DNA    [facility] Source Cervicovaginal      Chlamydia, Amplified DNA Not Detected      N gonorrhoeae, amplified DNA Not Detected     GREY TOP URINE HOLD    Extra Tube Hold for add-ons.     URINALYSIS MICROSCOPIC    RBC, UA 4      WBC, UA 4      Squamous Epithelial Cells, UA 1      Microscopic Comment       HEP C VIRUS HOLD SPECIMEN   VAGINOSIS SCREEN BY DNA PROBE          Imaging  Results    None          Medications - No data to display  Medical Decision Making  24-year-old  female who is 21w5d pregnant with no significant medical history who presents to the emergency department complaining of vaginal spotting this morning.  Patient reports waking up and using the restroom, when she wiped she saw a light pink discharge on the toilet paper.  On my exam, patient is well-appearing and in no acute distress.  Gravid uterus.  Dr. Wise performed pelvic exam - cevix soft and closed.  Thin fishy malodorous discharge noted, consistent with bacterial vaginosis. Normal external inspection, no lesions noted.    Based on available information and the initial assessment, the working differential diagnoses considered during this evaluation include but are not limited to asymptomatic UTI. Low concern for placental abruption or uterine rupture as symptoms are painless and denies any trauma    Patient's blood type is O-positive, there is no need to repeat type and screen. Obtaining labs and will reassess.     See ED course for further details    Clinically treating BV with topical flagyl. Instructed patient to keep scheduled appointment with OB on 25. Discussed with patient all pertinent ED information. Discussed pt dx and plan of tx. Gave patient all f/u and return to the ED instructions. All questions and concerns were addressed at this time. Patient  expresses understanding of information and instructions, and is comfortable with plan to discharge. Pt is stable for discharge.    I discussed with patient that evaluation in the ED does not suggest any emergent or life threatening medical conditions requiring immediate intervention beyond what was provided in the ED, and I believe patient is safe for discharge.  Regardless, an unremarkable evaluation in the ED does not preclude the development or presence of a serious of life threatening condition. As such, it was instructed that the patient return  immediately for any worsening or change in current symptoms.    Amount and/or Complexity of Data Reviewed  External Data Reviewed: labs.     Details: Blood type is O positive, do not need to repeat type and screen today  Labs: ordered. Decision-making details documented in ED Course.    Risk  Prescription drug management.               ED Course as of 05/02/25 1529   Fri May 02, 2025   1157 CBC auto differential(!)  Anemia [ST]   1438 Bedside ultrasound performed by Dr. Wise. IUP with cardiac activity,  BPM.  [ST]      ED Course User Index  [ST] Manuela Joshi PA-C                           Clinical Impression:  Final diagnoses:  [N76.0, B96.89] Bacterial vaginosis (Primary)          ED Disposition Condition    Discharge Stable          ED Prescriptions       Medication Sig Dispense Start Date End Date Auth. Provider    metroNIDAZOLE (METROGEL) 0.75 % (37.5mg/5 gram) vaginal gel Place 1 applicator vaginally once daily. for 5 days 5 applicator 5/2/2025 5/7/2025 Manuela Joshi PA-C          Follow-up Information       Follow up With Specialties Details Why Contact Info    [facility] - Emergency Dept Emergency Medicine Go to  If symptoms worsen [id number]  [facility] [id number] [phone]    Willi Souza NP Obstetrics and Gynecology Schedule an appointment as soon as possible for a visit  As needed [address] [facility] [address]  [phone]                   [1]   Social History  Tobacco Use    Smoking status: Never    Smokeless tobacco: Never   Substance Use Topics    Alcohol use: Not Currently    Drug use: Not Currently     Types: Marijuana     Comment: last used 1/15/2025        Manuela Joshi PA-C  05/02/25 1529

## 2025-05-02 NOTE — ED NOTES
Arrived to ED via pov from home with complaint of light spotting from vaginal area. Pt is 22 weeks pregnant. Pt also reports some lower back pain.

## 2025-05-07 ENCOUNTER — TELEPHONE (OUTPATIENT)
Dept: OBSTETRICS AND GYNECOLOGY | Facility: CLINIC | Age: 25
End: 2025-05-07
Payer: MEDICAID

## 2025-05-09 ENCOUNTER — LAB VISIT (OUTPATIENT)
Dept: LAB | Facility: OTHER | Age: 25
End: 2025-05-09
Attending: FAMILY MEDICINE
Payer: MEDICAID

## 2025-05-09 ENCOUNTER — INITIAL PRENATAL (OUTPATIENT)
Dept: OBSTETRICS AND GYNECOLOGY | Facility: CLINIC | Age: 25
End: 2025-05-09
Attending: OBSTETRICS & GYNECOLOGY
Payer: MEDICAID

## 2025-05-09 VITALS — BODY MASS INDEX: 28.87 KG/M2 | WEIGHT: 173.5 LBS | SYSTOLIC BLOOD PRESSURE: 100 MMHG | DIASTOLIC BLOOD PRESSURE: 56 MMHG

## 2025-05-09 DIAGNOSIS — Z84.82: Primary | ICD-10-CM

## 2025-05-09 DIAGNOSIS — Z32.01 POSITIVE PREGNANCY TEST: ICD-10-CM

## 2025-05-09 DIAGNOSIS — Z34.80 SUPERVISION OF OTHER NORMAL PREGNANCY, ANTEPARTUM: ICD-10-CM

## 2025-05-09 PROBLEM — F12.90 MARIJUANA USE: Status: RESOLVED | Noted: 2023-12-06 | Resolved: 2025-05-09

## 2025-05-09 PROBLEM — Z34.92 ENCOUNTER FOR SUPERVISION OF NORMAL PREGNANCY IN SECOND TRIMESTER: Status: RESOLVED | Noted: 2023-05-15 | Resolved: 2025-05-09

## 2025-05-09 PROBLEM — A59.9 TRICHOMONAS INFECTION: Status: RESOLVED | Noted: 2023-12-06 | Resolved: 2025-05-09

## 2025-05-09 PROBLEM — Z63.4 BEREAVEMENT: Status: RESOLVED | Noted: 2023-12-07 | Resolved: 2025-05-09

## 2025-05-09 LAB
ABSOLUTE EOSINOPHIL (OHS): 0.04 K/UL
ABSOLUTE MONOCYTE (OHS): 0.37 K/UL (ref 0.3–1)
ABSOLUTE NEUTROPHIL COUNT (OHS): 6.39 K/UL (ref 1.8–7.7)
BASOPHILS # BLD AUTO: 0.03 K/UL
BASOPHILS NFR BLD AUTO: 0.3 %
ERYTHROCYTE [DISTWIDTH] IN BLOOD BY AUTOMATED COUNT: 13.2 % (ref 11.5–14.5)
HBV SURFACE AG SERPL QL IA: NORMAL
HCT VFR BLD AUTO: 31.5 % (ref 37–48.5)
HCV AB SERPL QL IA: NEGATIVE
HGB BLD-MCNC: 10.7 GM/DL (ref 12–16)
HIV 1+2 AB+HIV1 P24 AG SERPL QL IA: NEGATIVE
IMM GRANULOCYTES # BLD AUTO: 0.07 K/UL (ref 0–0.04)
IMM GRANULOCYTES NFR BLD AUTO: 0.8 % (ref 0–0.5)
INDIRECT COOMBS: NORMAL
LYMPHOCYTES # BLD AUTO: 1.97 K/UL (ref 1–4.8)
MCH RBC QN AUTO: 29.2 PG (ref 27–31)
MCHC RBC AUTO-ENTMCNC: 34 G/DL (ref 32–36)
MCV RBC AUTO: 86 FL (ref 82–98)
NUCLEATED RBC (/100WBC) (OHS): 0 /100 WBC
PLATELET # BLD AUTO: 235 K/UL (ref 150–450)
PLATELET BLD QL SMEAR: ABNORMAL
PMV BLD AUTO: 10.4 FL (ref 9.2–12.9)
RBC # BLD AUTO: 3.66 M/UL (ref 4–5.4)
RELATIVE EOSINOPHIL (OHS): 0.5 %
RELATIVE LYMPHOCYTE (OHS): 22.2 % (ref 18–48)
RELATIVE MONOCYTE (OHS): 4.2 % (ref 4–15)
RELATIVE NEUTROPHIL (OHS): 72 % (ref 38–73)
RH BLD: NORMAL
SPECIMEN OUTDATE: NORMAL
T PALLIDUM IGG+IGM SER QL: NEGATIVE
WBC # BLD AUTO: 8.87 K/UL (ref 3.9–12.7)

## 2025-05-09 PROCEDURE — 87340 HEPATITIS B SURFACE AG IA: CPT

## 2025-05-09 PROCEDURE — 99212 OFFICE O/P EST SF 10 MIN: CPT | Mod: PBBFAC,25,TH | Performed by: ADVANCED PRACTICE MIDWIFE

## 2025-05-09 PROCEDURE — 99999 PR PBB SHADOW E&M-EST. PATIENT-LVL II: CPT | Mod: PBBFAC,,, | Performed by: ADVANCED PRACTICE MIDWIFE

## 2025-05-09 PROCEDURE — 86850 RBC ANTIBODY SCREEN: CPT | Performed by: FAMILY MEDICINE

## 2025-05-09 PROCEDURE — 86762 RUBELLA ANTIBODY: CPT

## 2025-05-09 PROCEDURE — 36415 COLL VENOUS BLD VENIPUNCTURE: CPT

## 2025-05-09 PROCEDURE — 87389 HIV-1 AG W/HIV-1&-2 AB AG IA: CPT

## 2025-05-09 PROCEDURE — 85025 COMPLETE CBC W/AUTO DIFF WBC: CPT

## 2025-05-09 PROCEDURE — 86593 SYPHILIS TEST NON-TREP QUANT: CPT

## 2025-05-09 PROCEDURE — 86787 VARICELLA-ZOSTER ANTIBODY: CPT

## 2025-05-09 PROCEDURE — 86803 HEPATITIS C AB TEST: CPT

## 2025-05-09 PROCEDURE — 99213 OFFICE O/P EST LOW 20 MIN: CPT | Mod: TH,S$PBB,, | Performed by: ADVANCED PRACTICE MIDWIFE

## 2025-05-12 LAB
RUBV IGG SER-ACNC: 60.3 IU/ML
RUBV IGG SER-IMP: REACTIVE
V.ZOSTER IGG INTERP (OHS): POSITIVE
VARICELLA ZOSTER IGG (OHS): 4.7 S/CO

## 2025-05-16 ENCOUNTER — TELEPHONE (OUTPATIENT)
Dept: OBSTETRICS AND GYNECOLOGY | Facility: CLINIC | Age: 25
End: 2025-05-16
Payer: MEDICAID

## 2025-05-16 ENCOUNTER — PATIENT MESSAGE (OUTPATIENT)
Dept: OBSTETRICS AND GYNECOLOGY | Facility: CLINIC | Age: 25
End: 2025-05-16
Payer: MEDICAID

## 2025-05-18 ENCOUNTER — PATIENT MESSAGE (OUTPATIENT)
Dept: OTHER | Facility: OTHER | Age: 25
End: 2025-05-18
Payer: MEDICAID

## 2025-05-22 ENCOUNTER — PROCEDURE VISIT (OUTPATIENT)
Dept: MATERNAL FETAL MEDICINE | Facility: CLINIC | Age: 25
End: 2025-05-22
Payer: MEDICAID

## 2025-05-22 ENCOUNTER — LAB VISIT (OUTPATIENT)
Dept: LAB | Facility: OTHER | Age: 25
End: 2025-05-22
Attending: ADVANCED PRACTICE MIDWIFE
Payer: MEDICAID

## 2025-05-22 DIAGNOSIS — Z36.2 ENCOUNTER FOR FOLLOW-UP ULTRASOUND OF FETAL ANATOMY: ICD-10-CM

## 2025-05-22 DIAGNOSIS — Z34.80 SUPERVISION OF OTHER NORMAL PREGNANCY, ANTEPARTUM: ICD-10-CM

## 2025-05-22 LAB — GLUCOSE SERPL-MCNC: 76 MG/DL (ref 70–140)

## 2025-05-22 PROCEDURE — 82950 GLUCOSE TEST: CPT

## 2025-05-22 PROCEDURE — 36415 COLL VENOUS BLD VENIPUNCTURE: CPT

## 2025-05-22 PROCEDURE — 76816 OB US FOLLOW-UP PER FETUS: CPT | Mod: PBBFAC | Performed by: OBSTETRICS & GYNECOLOGY

## 2025-05-30 ENCOUNTER — RESULTS FOLLOW-UP (OUTPATIENT)
Dept: OBSTETRICS AND GYNECOLOGY | Facility: CLINIC | Age: 25
End: 2025-05-30

## 2025-06-01 ENCOUNTER — PATIENT MESSAGE (OUTPATIENT)
Dept: OTHER | Facility: OTHER | Age: 25
End: 2025-06-01
Payer: MEDICAID

## 2025-06-13 ENCOUNTER — ROUTINE PRENATAL (OUTPATIENT)
Dept: OBSTETRICS AND GYNECOLOGY | Facility: CLINIC | Age: 25
End: 2025-06-13
Payer: MEDICAID

## 2025-06-13 VITALS
BODY MASS INDEX: 30.74 KG/M2 | DIASTOLIC BLOOD PRESSURE: 60 MMHG | SYSTOLIC BLOOD PRESSURE: 110 MMHG | WEIGHT: 184.75 LBS

## 2025-06-13 DIAGNOSIS — Z34.80 SUPERVISION OF OTHER NORMAL PREGNANCY, ANTEPARTUM: Primary | ICD-10-CM

## 2025-06-13 PROCEDURE — 99212 OFFICE O/P EST SF 10 MIN: CPT | Mod: PBBFAC,TH | Performed by: ADVANCED PRACTICE MIDWIFE

## 2025-06-13 PROCEDURE — 99999 PR PBB SHADOW E&M-EST. PATIENT-LVL II: CPT | Mod: PBBFAC,,, | Performed by: ADVANCED PRACTICE MIDWIFE

## 2025-06-13 NOTE — PROGRESS NOTES
"  Reason for visit: Routine Prenatal Visit      HPI:   24 y.o., at 27w5d by Estimated Date of Delivery: 25    In with no c/o other than "some fatigue"    - Contractions: denies  - Bleeding: denies  - Loss of fluid: denies  - Fetal movement: reports good FM, reinforced BID  - Nausea: no  - Vomiting: no  - Headache: no      Reviewed:    Past medical, surgical, social, family, and obstetric history: Reviewed and updated in EMR.  Medications: Reviewed and updated in EMR.  Allergies: Patient has no known allergies.    Pregnancy dating, labs, ultrasound reports, prenatal testing, and problem list: Reviewed and updated in EMR.  Outside records: na  Independent interpretation of tests: na  Discussion with another healthcare professional: na      Vitals: /60   Wt 83.8 kg (184 lb 11.9 oz)   LMP 2024   BMI 30.74 kg/m²     Physical exam:  GENERAL: No acute distress  ABD: Gravid      Assessment and Plan:    Supervision of other normal pregnancy, antepartum  -     Tdap Vaccine        Discussed recom for TDAP- pt desires and vaccine administered  Discussed BCM options- brochure provided- pt undecided  WIC form provided  Pt reports has breast pump at home  Discussed fatigue in pregnancy- pt reports taking an OTC Fe supplement daily- advised will recheck CBC at next visit     labor precautions given  Follow-up: 2 weeks      I spent a total of 20 minutes on the day of the visit. This includes face to face time and non-face to face time preparing to see the patient (eg, review of tests), Obtaining and/or reviewing separately obtained history, Documenting clinical information in the electronic or other health record, Independently interpreting results and communicating results to the patient/family/caregiver, or Care coordination.     "

## 2025-06-15 ENCOUNTER — PATIENT MESSAGE (OUTPATIENT)
Dept: OTHER | Facility: OTHER | Age: 25
End: 2025-06-15
Payer: MEDICAID

## 2025-06-29 ENCOUNTER — PATIENT MESSAGE (OUTPATIENT)
Dept: OTHER | Facility: OTHER | Age: 25
End: 2025-06-29
Payer: MEDICAID

## 2025-07-02 ENCOUNTER — ROUTINE PRENATAL (OUTPATIENT)
Dept: OBSTETRICS AND GYNECOLOGY | Facility: CLINIC | Age: 25
End: 2025-07-02
Payer: MEDICAID

## 2025-07-02 VITALS
BODY MASS INDEX: 31.18 KG/M2 | SYSTOLIC BLOOD PRESSURE: 112 MMHG | DIASTOLIC BLOOD PRESSURE: 60 MMHG | WEIGHT: 187.38 LBS

## 2025-07-02 DIAGNOSIS — Z34.80 SUPERVISION OF OTHER NORMAL PREGNANCY, ANTEPARTUM: Primary | ICD-10-CM

## 2025-07-02 DIAGNOSIS — O26.893 VAGINAL DISCHARGE DURING PREGNANCY IN THIRD TRIMESTER: ICD-10-CM

## 2025-07-02 DIAGNOSIS — N89.8 VAGINAL DISCHARGE DURING PREGNANCY IN THIRD TRIMESTER: ICD-10-CM

## 2025-07-02 PROCEDURE — 99999 PR PBB SHADOW E&M-EST. PATIENT-LVL II: CPT | Mod: PBBFAC,,, | Performed by: ADVANCED PRACTICE MIDWIFE

## 2025-07-02 PROCEDURE — 99212 OFFICE O/P EST SF 10 MIN: CPT | Mod: PBBFAC,TH | Performed by: ADVANCED PRACTICE MIDWIFE

## 2025-07-02 NOTE — PROGRESS NOTES
Pregnancy dating, labs, ultrasound reports, prenatal testing, and problem list; prior records and results; and available outside records were reviewed and updated in EMR.  Pertinent findings were noted below.    Reason for Visit   Routine Prenatal Visit    HPI   24 y.o., at 30w3d by Estimated Date of Delivery: 25    Patient notes increased discharge. Unsure of color, described as yellowish. No irritation or pain.   No new partners. Desires STD screening.     Contractions: No   Bleeding: No   Loss of fluid: No   Fetal movement: Yes   Nausea: No   Vomiting: No   Headache: No     Exam   /60   Wt 85 kg (187 lb 6.3 oz)   LMP 2024   BMI 31.18 kg/m²     TW  GENERAL: No acute distress  ABD: Gravid  SSE: Moderate amount of thick white/yellow discharge. No bleeding.   FH: 28  FHT: 138    Assessment and Plan   Supervision of other normal pregnancy, antepartum  -     HIV 1/2 Ag/Ab (4th Gen); Future; Expected date: 2025  -     Treponema Pallidium Antibodies IgG, IgM; Future; Expected date: 2025  -     CBC Auto Differential; Future; Expected date: 2025  -     Vaginosis Screen by DNA Probe  -     C. trachomatis/N. gonorrhoeae by AMP DNA [facility]; Cervicovaginal    Vaginal discharge during pregnancy in third trimester  - GC/CT collected      labor precautions given  Follow-up: 2 weeks on     Usha Santa MD    I have seen the patient, reviewed the Resident's assessment, plan, and progress note. I have personally interviewed and examined the patient at bedside and agree with the findings.       Elvira London CNM  Obstetrics  [facility]

## 2025-07-06 ENCOUNTER — RESULTS FOLLOW-UP (OUTPATIENT)
Dept: EMERGENCY MEDICINE | Facility: OTHER | Age: 25
End: 2025-07-06
Payer: MEDICAID

## 2025-07-06 RX ORDER — ASPIRIN 325 MG
1 TABLET, DELAYED RELEASE (ENTERIC COATED) ORAL NIGHTLY
Qty: 45 G | Refills: 0 | Status: SHIPPED | OUTPATIENT
Start: 2025-07-06

## 2025-07-06 RX ORDER — METRONIDAZOLE 500 MG/1
500 TABLET ORAL EVERY 12 HOURS
Qty: 14 TABLET | Refills: 0 | Status: SHIPPED | OUTPATIENT
Start: 2025-07-06 | End: 2025-07-13

## 2025-07-13 ENCOUNTER — PATIENT MESSAGE (OUTPATIENT)
Dept: OTHER | Facility: OTHER | Age: 25
End: 2025-07-13
Payer: MEDICAID

## 2025-08-01 ENCOUNTER — ROUTINE PRENATAL (OUTPATIENT)
Dept: OBSTETRICS AND GYNECOLOGY | Facility: CLINIC | Age: 25
End: 2025-08-01
Payer: MEDICAID

## 2025-08-01 ENCOUNTER — LAB VISIT (OUTPATIENT)
Dept: LAB | Facility: OTHER | Age: 25
End: 2025-08-01
Attending: ADVANCED PRACTICE MIDWIFE
Payer: MEDICAID

## 2025-08-01 VITALS
BODY MASS INDEX: 33.93 KG/M2 | SYSTOLIC BLOOD PRESSURE: 114 MMHG | WEIGHT: 203.94 LBS | DIASTOLIC BLOOD PRESSURE: 66 MMHG

## 2025-08-01 DIAGNOSIS — Z34.80 SUPERVISION OF OTHER NORMAL PREGNANCY, ANTEPARTUM: Primary | ICD-10-CM

## 2025-08-01 DIAGNOSIS — Z34.80 SUPERVISION OF OTHER NORMAL PREGNANCY, ANTEPARTUM: ICD-10-CM

## 2025-08-01 DIAGNOSIS — Z30.017 ENCOUNTER FOR INITIAL PRESCRIPTION OF IMPLANTABLE SUBDERMAL CONTRACEPTIVE: ICD-10-CM

## 2025-08-01 PROBLEM — Z30.9 CONTRACEPTION MANAGEMENT: Status: ACTIVE | Noted: 2025-08-01

## 2025-08-01 LAB
ABSOLUTE EOSINOPHIL (OHS): 0.06 K/UL
ABSOLUTE MONOCYTE (OHS): 0.74 K/UL (ref 0.3–1)
ABSOLUTE NEUTROPHIL COUNT (OHS): 6.82 K/UL (ref 1.8–7.7)
BASOPHILS # BLD AUTO: 0.02 K/UL
BASOPHILS NFR BLD AUTO: 0.2 %
ERYTHROCYTE [DISTWIDTH] IN BLOOD BY AUTOMATED COUNT: 13.2 % (ref 11.5–14.5)
HCT VFR BLD AUTO: 31.7 % (ref 37–48.5)
HGB BLD-MCNC: 11 GM/DL (ref 12–16)
HIV 1+2 AB+HIV1 P24 AG SERPL QL IA: NORMAL
IMM GRANULOCYTES # BLD AUTO: 0.15 K/UL (ref 0–0.04)
IMM GRANULOCYTES NFR BLD AUTO: 1.6 % (ref 0–0.5)
LYMPHOCYTES # BLD AUTO: 1.7 K/UL (ref 1–4.8)
MCH RBC QN AUTO: 29.5 PG (ref 27–31)
MCHC RBC AUTO-ENTMCNC: 34.7 G/DL (ref 32–36)
MCV RBC AUTO: 85 FL (ref 82–98)
NUCLEATED RBC (/100WBC) (OHS): 0 /100 WBC
PLATELET # BLD AUTO: 244 K/UL (ref 150–450)
PMV BLD AUTO: 9.7 FL (ref 9.2–12.9)
RBC # BLD AUTO: 3.73 M/UL (ref 4–5.4)
RELATIVE EOSINOPHIL (OHS): 0.6 %
RELATIVE LYMPHOCYTE (OHS): 17.9 % (ref 18–48)
RELATIVE MONOCYTE (OHS): 7.8 % (ref 4–15)
RELATIVE NEUTROPHIL (OHS): 71.9 % (ref 38–73)
T PALLIDUM IGG+IGM SER QL: NORMAL
WBC # BLD AUTO: 9.49 K/UL (ref 3.9–12.7)

## 2025-08-01 PROCEDURE — 99999 PR PBB SHADOW E&M-EST. PATIENT-LVL II: CPT | Mod: PBBFAC,,, | Performed by: ADVANCED PRACTICE MIDWIFE

## 2025-08-01 PROCEDURE — 99212 OFFICE O/P EST SF 10 MIN: CPT | Mod: PBBFAC,TH | Performed by: ADVANCED PRACTICE MIDWIFE

## 2025-08-01 PROCEDURE — 86593 SYPHILIS TEST NON-TREP QUANT: CPT

## 2025-08-01 PROCEDURE — 85025 COMPLETE CBC W/AUTO DIFF WBC: CPT

## 2025-08-01 PROCEDURE — 87389 HIV-1 AG W/HIV-1&-2 AB AG IA: CPT

## 2025-08-01 PROCEDURE — 36415 COLL VENOUS BLD VENIPUNCTURE: CPT

## 2025-08-01 NOTE — PROGRESS NOTES
Reason for visit: Routine Prenatal Visit      HPI:   24 y.o., at 34w5d by Estimated Date of Delivery: 25    In with no c/o    - Contractions: [id number]  - Bleeding: denies  - Loss of fluid: denies  - Fetal movement: reports good FM, reinforced BID  - Nausea: no  - Vomiting: no  - Headache: no      Reviewed:    Past medical, surgical, social, family, and obstetric history: Reviewed and updated in EMR.  Medications: Reviewed and updated in EMR.  Allergies: Patient has no known allergies.    Pregnancy dating, labs, ultrasound reports, prenatal testing, and problem list: Reviewed and updated in EMR.  Outside records: na  Independent interpretation of tests: na  Discussion with another healthcare professional: na      Vitals: /66   Wt 92.5 kg (203 lb 14.8 oz)   LMP 2024   BMI 33.93 kg/m²     Physical exam:  GENERAL: No acute distress  ABD: Gravid      Assessment and Plan:    Supervision of other normal pregnancy, antepartum        3rd tri labs today  Discussed GBS next visit  Pt is considering nexplanon at delivery     labor precautions given  Follow-up: 2 weeks      I spent a total of 20 minutes on the day of the visit. This includes face to face time and non-face to face time preparing to see the patient (eg, review of tests), Obtaining and/or reviewing separately obtained history, Documenting clinical information in the electronic or other health record, Independently interpreting results and communicating results to the patient/family/caregiver, or Care coordination.

## 2025-08-03 ENCOUNTER — PATIENT MESSAGE (OUTPATIENT)
Dept: OTHER | Facility: OTHER | Age: 25
End: 2025-08-03
Payer: MEDICAID

## 2025-08-15 ENCOUNTER — PATIENT OUTREACH (OUTPATIENT)
Dept: ADMINISTRATIVE | Facility: OTHER | Age: 25
End: 2025-08-15
Payer: MEDICAID

## 2025-08-15 ENCOUNTER — ROUTINE PRENATAL (OUTPATIENT)
Dept: OBSTETRICS AND GYNECOLOGY | Facility: CLINIC | Age: 25
End: 2025-08-15
Payer: MEDICAID

## 2025-08-15 VITALS
WEIGHT: 206.81 LBS | BODY MASS INDEX: 34.41 KG/M2 | SYSTOLIC BLOOD PRESSURE: 122 MMHG | DIASTOLIC BLOOD PRESSURE: 60 MMHG

## 2025-08-15 DIAGNOSIS — Z34.80 SUPERVISION OF OTHER NORMAL PREGNANCY, ANTEPARTUM: Primary | ICD-10-CM

## 2025-08-15 PROCEDURE — 87653 STREP B DNA AMP PROBE: CPT | Performed by: ADVANCED PRACTICE MIDWIFE

## 2025-08-15 PROCEDURE — 99999 PR PBB SHADOW E&M-EST. PATIENT-LVL II: CPT | Mod: PBBFAC,,, | Performed by: ADVANCED PRACTICE MIDWIFE

## 2025-08-15 PROCEDURE — 99212 OFFICE O/P EST SF 10 MIN: CPT | Mod: PBBFAC,TH | Performed by: ADVANCED PRACTICE MIDWIFE

## 2025-08-17 LAB — GROUP B STREPTOCOCCUS, PCR (OHS): POSITIVE

## 2025-08-22 ENCOUNTER — ROUTINE PRENATAL (OUTPATIENT)
Dept: OBSTETRICS AND GYNECOLOGY | Facility: CLINIC | Age: 25
End: 2025-08-22
Payer: MEDICAID

## 2025-08-22 VITALS
DIASTOLIC BLOOD PRESSURE: 68 MMHG | SYSTOLIC BLOOD PRESSURE: 114 MMHG | BODY MASS INDEX: 34.85 KG/M2 | WEIGHT: 209.44 LBS

## 2025-08-22 DIAGNOSIS — Z34.80 SUPERVISION OF OTHER NORMAL PREGNANCY, ANTEPARTUM: Primary | ICD-10-CM

## 2025-08-22 DIAGNOSIS — Z22.330 GBS CARRIER: ICD-10-CM

## 2025-08-22 PROCEDURE — 99999 PR PBB SHADOW E&M-EST. PATIENT-LVL I: CPT | Mod: PBBFAC,,, | Performed by: OBSTETRICS & GYNECOLOGY

## 2025-08-22 PROCEDURE — 99211 OFF/OP EST MAY X REQ PHY/QHP: CPT | Mod: PBBFAC,TH | Performed by: OBSTETRICS & GYNECOLOGY

## 2025-08-29 ENCOUNTER — ROUTINE PRENATAL (OUTPATIENT)
Dept: OBSTETRICS AND GYNECOLOGY | Facility: CLINIC | Age: 25
End: 2025-08-29
Payer: MEDICAID